# Patient Record
Sex: FEMALE | Race: BLACK OR AFRICAN AMERICAN | NOT HISPANIC OR LATINO | URBAN - METROPOLITAN AREA
[De-identification: names, ages, dates, MRNs, and addresses within clinical notes are randomized per-mention and may not be internally consistent; named-entity substitution may affect disease eponyms.]

---

## 2020-05-03 ENCOUNTER — INPATIENT (INPATIENT)
Facility: HOSPITAL | Age: 55
LOS: 4 days | Discharge: HOME | End: 2020-05-08
Attending: INTERNAL MEDICINE | Admitting: INTERNAL MEDICINE
Payer: COMMERCIAL

## 2020-05-03 VITALS
OXYGEN SATURATION: 98 % | SYSTOLIC BLOOD PRESSURE: 121 MMHG | HEART RATE: 110 BPM | RESPIRATION RATE: 18 BRPM | TEMPERATURE: 100 F | DIASTOLIC BLOOD PRESSURE: 71 MMHG

## 2020-05-03 DIAGNOSIS — K65.1 PERITONEAL ABSCESS: ICD-10-CM

## 2020-05-03 DIAGNOSIS — E11.8 TYPE 2 DIABETES MELLITUS WITH UNSPECIFIED COMPLICATIONS: ICD-10-CM

## 2020-05-03 LAB
ALBUMIN SERPL ELPH-MCNC: 3.1 G/DL — LOW (ref 3.3–5)
ALBUMIN SERPL ELPH-MCNC: 3.2 G/DL — LOW (ref 3.3–5)
ALP SERPL-CCNC: 105 U/L — SIGNIFICANT CHANGE UP (ref 40–120)
ALP SERPL-CCNC: 124 U/L — HIGH (ref 40–120)
ALT FLD-CCNC: 11 U/L — SIGNIFICANT CHANGE UP (ref 4–33)
ALT FLD-CCNC: 16 U/L — SIGNIFICANT CHANGE UP (ref 4–33)
ANION GAP SERPL CALC-SCNC: 15 MMO/L — HIGH (ref 7–14)
ANION GAP SERPL CALC-SCNC: 24 MMO/L — HIGH (ref 7–14)
ANISOCYTOSIS BLD QL: SLIGHT — SIGNIFICANT CHANGE UP
APPEARANCE UR: CLEAR — SIGNIFICANT CHANGE UP
APTT BLD: 33 SEC — SIGNIFICANT CHANGE UP (ref 27.5–36.3)
APTT BLD: 35.6 SEC — SIGNIFICANT CHANGE UP (ref 27.5–36.3)
AST SERPL-CCNC: 24 U/L — SIGNIFICANT CHANGE UP (ref 4–32)
AST SERPL-CCNC: 8 U/L — SIGNIFICANT CHANGE UP (ref 4–32)
B-OH-BUTYR SERPL-SCNC: 1.2 MMOL/L — HIGH (ref 0–0.4)
BACTERIA # UR AUTO: SIGNIFICANT CHANGE UP
BASE EXCESS BLDV CALC-SCNC: -4.1 MMOL/L — SIGNIFICANT CHANGE UP
BASOPHILS # BLD AUTO: 0.02 K/UL — SIGNIFICANT CHANGE UP (ref 0–0.2)
BASOPHILS # BLD AUTO: 0.03 K/UL — SIGNIFICANT CHANGE UP (ref 0–0.2)
BASOPHILS NFR BLD AUTO: 0.1 % — SIGNIFICANT CHANGE UP (ref 0–2)
BASOPHILS NFR BLD AUTO: 0.2 % — SIGNIFICANT CHANGE UP (ref 0–2)
BASOPHILS NFR SPEC: 0 % — SIGNIFICANT CHANGE UP (ref 0–2)
BASOPHILS NFR SPEC: 0 % — SIGNIFICANT CHANGE UP (ref 0–2)
BILIRUB SERPL-MCNC: 0.6 MG/DL — SIGNIFICANT CHANGE UP (ref 0.2–1.2)
BILIRUB SERPL-MCNC: 0.9 MG/DL — SIGNIFICANT CHANGE UP (ref 0.2–1.2)
BILIRUB UR-MCNC: NEGATIVE — SIGNIFICANT CHANGE UP
BLASTS # FLD: 0 % — SIGNIFICANT CHANGE UP (ref 0–0)
BLASTS # FLD: 0 % — SIGNIFICANT CHANGE UP (ref 0–0)
BLD GP AB SCN SERPL QL: NEGATIVE — SIGNIFICANT CHANGE UP
BLISTER CELLS BLD QL SMEAR: PRESENT — SIGNIFICANT CHANGE UP
BLOOD GAS VENOUS - CREATININE: 0.59 MG/DL — SIGNIFICANT CHANGE UP (ref 0.5–1.3)
BLOOD GAS VENOUS - FIO2: 21 — SIGNIFICANT CHANGE UP
BLOOD UR QL VISUAL: NEGATIVE — SIGNIFICANT CHANGE UP
BUN SERPL-MCNC: 11 MG/DL — SIGNIFICANT CHANGE UP (ref 7–23)
BUN SERPL-MCNC: 12 MG/DL — SIGNIFICANT CHANGE UP (ref 7–23)
CALCIUM SERPL-MCNC: 8.8 MG/DL — SIGNIFICANT CHANGE UP (ref 8.4–10.5)
CALCIUM SERPL-MCNC: 9.6 MG/DL — SIGNIFICANT CHANGE UP (ref 8.4–10.5)
CEA SERPL-MCNC: 1 NG/ML — SIGNIFICANT CHANGE UP (ref 1–3.8)
CHLORIDE BLDV-SCNC: 102 MMOL/L — SIGNIFICANT CHANGE UP (ref 96–108)
CHLORIDE SERPL-SCNC: 91 MMOL/L — LOW (ref 98–107)
CHLORIDE SERPL-SCNC: 97 MMOL/L — LOW (ref 98–107)
CO2 SERPL-SCNC: 15 MMOL/L — LOW (ref 22–31)
CO2 SERPL-SCNC: 23 MMOL/L — SIGNIFICANT CHANGE UP (ref 22–31)
COLOR SPEC: YELLOW — SIGNIFICANT CHANGE UP
CREAT SERPL-MCNC: 0.62 MG/DL — SIGNIFICANT CHANGE UP (ref 0.5–1.3)
CREAT SERPL-MCNC: 0.64 MG/DL — SIGNIFICANT CHANGE UP (ref 0.5–1.3)
DACRYOCYTES BLD QL SMEAR: SLIGHT — SIGNIFICANT CHANGE UP
ELLIPTOCYTES BLD QL SMEAR: SLIGHT — SIGNIFICANT CHANGE UP
ELLIPTOCYTES BLD QL SMEAR: SLIGHT — SIGNIFICANT CHANGE UP
EOSINOPHIL # BLD AUTO: 0 K/UL — SIGNIFICANT CHANGE UP (ref 0–0.5)
EOSINOPHIL # BLD AUTO: 0.31 K/UL — SIGNIFICANT CHANGE UP (ref 0–0.5)
EOSINOPHIL NFR BLD AUTO: 0 % — SIGNIFICANT CHANGE UP (ref 0–6)
EOSINOPHIL NFR BLD AUTO: 2 % — SIGNIFICANT CHANGE UP (ref 0–6)
EOSINOPHIL NFR FLD: 0 % — SIGNIFICANT CHANGE UP (ref 0–6)
EOSINOPHIL NFR FLD: 0 % — SIGNIFICANT CHANGE UP (ref 0–6)
GAS PNL BLDV: 130 MMOL/L — LOW (ref 136–146)
GIANT PLATELETS BLD QL SMEAR: PRESENT — SIGNIFICANT CHANGE UP
GIANT PLATELETS BLD QL SMEAR: PRESENT — SIGNIFICANT CHANGE UP
GLUCOSE BLDC GLUCOMTR-MCNC: 195 MG/DL — HIGH (ref 70–99)
GLUCOSE BLDC GLUCOMTR-MCNC: 317 MG/DL — HIGH (ref 70–99)
GLUCOSE BLDC GLUCOMTR-MCNC: 370 MG/DL — HIGH (ref 70–99)
GLUCOSE BLDC GLUCOMTR-MCNC: 413 MG/DL — HIGH (ref 70–99)
GLUCOSE BLDV-MCNC: 485 MG/DL — CRITICAL HIGH (ref 70–99)
GLUCOSE SERPL-MCNC: 393 MG/DL — HIGH (ref 70–99)
GLUCOSE SERPL-MCNC: 510 MG/DL — CRITICAL HIGH (ref 70–99)
GLUCOSE UR-MCNC: >1000 — HIGH
HBA1C BLD-MCNC: 12.9 % — HIGH (ref 4–5.6)
HCG SERPL-ACNC: < 5 MIU/ML — SIGNIFICANT CHANGE UP
HCO3 BLDV-SCNC: 21 MMOL/L — SIGNIFICANT CHANGE UP (ref 20–27)
HCT VFR BLD CALC: 36.1 % — SIGNIFICANT CHANGE UP (ref 34.5–45)
HCT VFR BLD CALC: 40.1 % — SIGNIFICANT CHANGE UP (ref 34.5–45)
HCT VFR BLDV CALC: 38.9 % — SIGNIFICANT CHANGE UP (ref 34.5–45)
HGB BLD-MCNC: 12.4 G/DL — SIGNIFICANT CHANGE UP (ref 11.5–15.5)
HGB BLD-MCNC: 12.6 G/DL — SIGNIFICANT CHANGE UP (ref 11.5–15.5)
HGB BLDV-MCNC: 12.6 G/DL — SIGNIFICANT CHANGE UP (ref 11.5–15.5)
HYALINE CASTS # UR AUTO: NEGATIVE — SIGNIFICANT CHANGE UP
IMM GRANULOCYTES NFR BLD AUTO: 0.2 % — SIGNIFICANT CHANGE UP (ref 0–1.5)
IMM GRANULOCYTES NFR BLD AUTO: 0.2 % — SIGNIFICANT CHANGE UP (ref 0–1.5)
INR BLD: 1.44 — HIGH (ref 0.88–1.17)
INR BLD: 1.48 — HIGH (ref 0.88–1.17)
KETONES UR-MCNC: HIGH
LACTATE BLDV-MCNC: 3.1 MMOL/L — HIGH (ref 0.5–2)
LACTATE SERPL-SCNC: 1.7 MMOL/L — SIGNIFICANT CHANGE UP (ref 0.5–2)
LEUKOCYTE ESTERASE UR-ACNC: NEGATIVE — SIGNIFICANT CHANGE UP
LIDOCAIN IGE QN: 13.3 U/L — SIGNIFICANT CHANGE UP (ref 7–60)
LYMPHOCYTES # BLD AUTO: 0.68 K/UL — LOW (ref 1–3.3)
LYMPHOCYTES # BLD AUTO: 1.07 K/UL — SIGNIFICANT CHANGE UP (ref 1–3.3)
LYMPHOCYTES # BLD AUTO: 4.2 % — LOW (ref 13–44)
LYMPHOCYTES # BLD AUTO: 7 % — LOW (ref 13–44)
LYMPHOCYTES NFR SPEC AUTO: 4.4 % — LOW (ref 13–44)
LYMPHOCYTES NFR SPEC AUTO: 7 % — LOW (ref 13–44)
MANUAL SMEAR VERIFICATION: SIGNIFICANT CHANGE UP
MCHC RBC-ENTMCNC: 25.3 PG — LOW (ref 27–34)
MCHC RBC-ENTMCNC: 27.1 PG — SIGNIFICANT CHANGE UP (ref 27–34)
MCHC RBC-ENTMCNC: 31.4 % — LOW (ref 32–36)
MCHC RBC-ENTMCNC: 34.3 % — SIGNIFICANT CHANGE UP (ref 32–36)
MCV RBC AUTO: 78.8 FL — LOW (ref 80–100)
MCV RBC AUTO: 80.4 FL — SIGNIFICANT CHANGE UP (ref 80–100)
METAMYELOCYTES # FLD: 3.5 % — HIGH (ref 0–1)
METAMYELOCYTES # FLD: 9.6 % — HIGH (ref 0–1)
MICROCYTES BLD QL: SLIGHT — SIGNIFICANT CHANGE UP
MONOCYTES # BLD AUTO: 0.37 K/UL — SIGNIFICANT CHANGE UP (ref 0–0.9)
MONOCYTES # BLD AUTO: 0.51 K/UL — SIGNIFICANT CHANGE UP (ref 0–0.9)
MONOCYTES NFR BLD AUTO: 2.3 % — SIGNIFICANT CHANGE UP (ref 2–14)
MONOCYTES NFR BLD AUTO: 3.3 % — SIGNIFICANT CHANGE UP (ref 2–14)
MONOCYTES NFR BLD: 0.9 % — LOW (ref 2–9)
MONOCYTES NFR BLD: 2.6 % — SIGNIFICANT CHANGE UP (ref 2–9)
MYELOCYTES NFR BLD: 0 % — SIGNIFICANT CHANGE UP (ref 0–0)
MYELOCYTES NFR BLD: 1.8 % — HIGH (ref 0–0)
NEUTROPHIL AB SER-ACNC: 61.7 % — SIGNIFICANT CHANGE UP (ref 43–77)
NEUTROPHIL AB SER-ACNC: 72.8 % — SIGNIFICANT CHANGE UP (ref 43–77)
NEUTROPHILS # BLD AUTO: 13.29 K/UL — HIGH (ref 1.8–7.4)
NEUTROPHILS # BLD AUTO: 15.08 K/UL — HIGH (ref 1.8–7.4)
NEUTROPHILS NFR BLD AUTO: 87.4 % — HIGH (ref 43–77)
NEUTROPHILS NFR BLD AUTO: 93.1 % — HIGH (ref 43–77)
NEUTS BAND # BLD: 10.5 % — HIGH (ref 0–6)
NEUTS BAND # BLD: 23.5 % — HIGH (ref 0–6)
NITRITE UR-MCNC: NEGATIVE — SIGNIFICANT CHANGE UP
NRBC # FLD: 0 K/UL — SIGNIFICANT CHANGE UP (ref 0–0)
NRBC # FLD: 0 K/UL — SIGNIFICANT CHANGE UP (ref 0–0)
OTHER - HEMATOLOGY %: 0 — SIGNIFICANT CHANGE UP
OTHER - HEMATOLOGY %: 0 — SIGNIFICANT CHANGE UP
PCO2 BLDV: 39 MMHG — LOW (ref 41–51)
PH BLDV: 7.35 PH — SIGNIFICANT CHANGE UP (ref 7.32–7.43)
PH UR: 6 — SIGNIFICANT CHANGE UP (ref 5–8)
PLATELET # BLD AUTO: 523 K/UL — HIGH (ref 150–400)
PLATELET # BLD AUTO: 545 K/UL — HIGH (ref 150–400)
PLATELET COUNT - ESTIMATE: SIGNIFICANT CHANGE UP
PLATELET COUNT - ESTIMATE: SIGNIFICANT CHANGE UP
PMV BLD: 10.2 FL — SIGNIFICANT CHANGE UP (ref 7–13)
PMV BLD: 9.6 FL — SIGNIFICANT CHANGE UP (ref 7–13)
PO2 BLDV: 69 MMHG — HIGH (ref 35–40)
POIKILOCYTOSIS BLD QL AUTO: SIGNIFICANT CHANGE UP
POIKILOCYTOSIS BLD QL AUTO: SLIGHT — SIGNIFICANT CHANGE UP
POLYCHROMASIA BLD QL SMEAR: SIGNIFICANT CHANGE UP
POLYCHROMASIA BLD QL SMEAR: SLIGHT — SIGNIFICANT CHANGE UP
POTASSIUM BLDV-SCNC: 4.1 MMOL/L — SIGNIFICANT CHANGE UP (ref 3.4–4.5)
POTASSIUM SERPL-MCNC: 4.3 MMOL/L — SIGNIFICANT CHANGE UP (ref 3.5–5.3)
POTASSIUM SERPL-MCNC: 5 MMOL/L — SIGNIFICANT CHANGE UP (ref 3.5–5.3)
POTASSIUM SERPL-SCNC: 4.3 MMOL/L — SIGNIFICANT CHANGE UP (ref 3.5–5.3)
POTASSIUM SERPL-SCNC: 5 MMOL/L — SIGNIFICANT CHANGE UP (ref 3.5–5.3)
PROMYELOCYTES # FLD: 0 % — SIGNIFICANT CHANGE UP (ref 0–0)
PROMYELOCYTES # FLD: 0 % — SIGNIFICANT CHANGE UP (ref 0–0)
PROT SERPL-MCNC: 7.3 G/DL — SIGNIFICANT CHANGE UP (ref 6–8.3)
PROT SERPL-MCNC: 8 G/DL — SIGNIFICANT CHANGE UP (ref 6–8.3)
PROT UR-MCNC: 20 — SIGNIFICANT CHANGE UP
PROTHROM AB SERPL-ACNC: 16.8 SEC — HIGH (ref 9.8–13.1)
PROTHROM AB SERPL-ACNC: 17.1 SEC — HIGH (ref 9.8–13.1)
RBC # BLD: 4.58 M/UL — SIGNIFICANT CHANGE UP (ref 3.8–5.2)
RBC # BLD: 4.99 M/UL — SIGNIFICANT CHANGE UP (ref 3.8–5.2)
RBC # FLD: 14.6 % — HIGH (ref 10.3–14.5)
RBC # FLD: 14.7 % — HIGH (ref 10.3–14.5)
RBC CASTS # UR COMP ASSIST: SIGNIFICANT CHANGE UP (ref 0–?)
RH IG SCN BLD-IMP: POSITIVE — SIGNIFICANT CHANGE UP
SAO2 % BLDV: 91.1 % — HIGH (ref 60–85)
SODIUM SERPL-SCNC: 130 MMOL/L — LOW (ref 135–145)
SODIUM SERPL-SCNC: 135 MMOL/L — SIGNIFICANT CHANGE UP (ref 135–145)
SP GR SPEC: > 1.04 — HIGH (ref 1–1.04)
SPHEROCYTES BLD QL SMEAR: SLIGHT — SIGNIFICANT CHANGE UP
SQUAMOUS # UR AUTO: SIGNIFICANT CHANGE UP
UROBILINOGEN FLD QL: NORMAL — SIGNIFICANT CHANGE UP
VARIANT LYMPHS # BLD: 0 % — SIGNIFICANT CHANGE UP
VARIANT LYMPHS # BLD: 1.7 % — SIGNIFICANT CHANGE UP
WBC # BLD: 15.23 K/UL — HIGH (ref 3.8–10.5)
WBC # BLD: 16.2 K/UL — HIGH (ref 3.8–10.5)
WBC # FLD AUTO: 15.23 K/UL — HIGH (ref 3.8–10.5)
WBC # FLD AUTO: 16.2 K/UL — HIGH (ref 3.8–10.5)
WBC UR QL: SIGNIFICANT CHANGE UP (ref 0–?)

## 2020-05-03 PROCEDURE — 71045 X-RAY EXAM CHEST 1 VIEW: CPT | Mod: 26

## 2020-05-03 PROCEDURE — 99222 1ST HOSP IP/OBS MODERATE 55: CPT | Mod: GC

## 2020-05-03 PROCEDURE — 74177 CT ABD & PELVIS W/CONTRAST: CPT | Mod: 26

## 2020-05-03 RX ORDER — INSULIN LISPRO 100/ML
4 VIAL (ML) SUBCUTANEOUS ONCE
Refills: 0 | Status: DISCONTINUED | OUTPATIENT
Start: 2020-05-03 | End: 2020-05-03

## 2020-05-03 RX ORDER — INSULIN LISPRO 100/ML
VIAL (ML) SUBCUTANEOUS
Refills: 0 | Status: DISCONTINUED | OUTPATIENT
Start: 2020-05-03 | End: 2020-05-03

## 2020-05-03 RX ORDER — DEXTROSE 50 % IN WATER 50 %
12.5 SYRINGE (ML) INTRAVENOUS ONCE
Refills: 0 | Status: DISCONTINUED | OUTPATIENT
Start: 2020-05-03 | End: 2020-05-08

## 2020-05-03 RX ORDER — ACETAMINOPHEN 500 MG
650 TABLET ORAL ONCE
Refills: 0 | Status: COMPLETED | OUTPATIENT
Start: 2020-05-03 | End: 2020-05-03

## 2020-05-03 RX ORDER — GLUCAGON INJECTION, SOLUTION 0.5 MG/.1ML
1 INJECTION, SOLUTION SUBCUTANEOUS ONCE
Refills: 0 | Status: DISCONTINUED | OUTPATIENT
Start: 2020-05-03 | End: 2020-05-08

## 2020-05-03 RX ORDER — PIPERACILLIN AND TAZOBACTAM 4; .5 G/20ML; G/20ML
3.38 INJECTION, POWDER, LYOPHILIZED, FOR SOLUTION INTRAVENOUS ONCE
Refills: 0 | Status: COMPLETED | OUTPATIENT
Start: 2020-05-03 | End: 2020-05-03

## 2020-05-03 RX ORDER — MORPHINE SULFATE 50 MG/1
4 CAPSULE, EXTENDED RELEASE ORAL ONCE
Refills: 0 | Status: DISCONTINUED | OUTPATIENT
Start: 2020-05-03 | End: 2020-05-03

## 2020-05-03 RX ORDER — DEXTROSE 50 % IN WATER 50 %
15 SYRINGE (ML) INTRAVENOUS ONCE
Refills: 0 | Status: DISCONTINUED | OUTPATIENT
Start: 2020-05-03 | End: 2020-05-08

## 2020-05-03 RX ORDER — INSULIN GLARGINE 100 [IU]/ML
18 INJECTION, SOLUTION SUBCUTANEOUS ONCE
Refills: 0 | Status: COMPLETED | OUTPATIENT
Start: 2020-05-03 | End: 2020-05-03

## 2020-05-03 RX ORDER — FENTANYL CITRATE 50 UG/ML
50 INJECTION INTRAVENOUS ONCE
Refills: 0 | Status: DISCONTINUED | OUTPATIENT
Start: 2020-05-03 | End: 2020-05-03

## 2020-05-03 RX ORDER — INSULIN GLARGINE 100 [IU]/ML
18 INJECTION, SOLUTION SUBCUTANEOUS AT BEDTIME
Refills: 0 | Status: DISCONTINUED | OUTPATIENT
Start: 2020-05-04 | End: 2020-05-04

## 2020-05-03 RX ORDER — HYDROMORPHONE HYDROCHLORIDE 2 MG/ML
0.5 INJECTION INTRAMUSCULAR; INTRAVENOUS; SUBCUTANEOUS ONCE
Refills: 0 | Status: DISCONTINUED | OUTPATIENT
Start: 2020-05-03 | End: 2020-05-04

## 2020-05-03 RX ORDER — INSULIN HUMAN 100 [IU]/ML
6 INJECTION, SOLUTION SUBCUTANEOUS ONCE
Refills: 0 | Status: COMPLETED | OUTPATIENT
Start: 2020-05-03 | End: 2020-05-03

## 2020-05-03 RX ORDER — INSULIN LISPRO 100/ML
VIAL (ML) SUBCUTANEOUS EVERY 4 HOURS
Refills: 0 | Status: DISCONTINUED | OUTPATIENT
Start: 2020-05-03 | End: 2020-05-04

## 2020-05-03 RX ORDER — SODIUM CHLORIDE 9 MG/ML
1000 INJECTION, SOLUTION INTRAVENOUS
Refills: 0 | Status: DISCONTINUED | OUTPATIENT
Start: 2020-05-03 | End: 2020-05-08

## 2020-05-03 RX ORDER — INSULIN LISPRO 100/ML
VIAL (ML) SUBCUTANEOUS EVERY 6 HOURS
Refills: 0 | Status: DISCONTINUED | OUTPATIENT
Start: 2020-05-03 | End: 2020-05-03

## 2020-05-03 RX ORDER — SODIUM CHLORIDE 9 MG/ML
1000 INJECTION INTRAMUSCULAR; INTRAVENOUS; SUBCUTANEOUS ONCE
Refills: 0 | Status: COMPLETED | OUTPATIENT
Start: 2020-05-03 | End: 2020-05-03

## 2020-05-03 RX ORDER — SODIUM CHLORIDE 9 MG/ML
1000 INJECTION, SOLUTION INTRAVENOUS
Refills: 0 | Status: DISCONTINUED | OUTPATIENT
Start: 2020-05-03 | End: 2020-05-03

## 2020-05-03 RX ORDER — KETOROLAC TROMETHAMINE 30 MG/ML
15 SYRINGE (ML) INJECTION ONCE
Refills: 0 | Status: DISCONTINUED | OUTPATIENT
Start: 2020-05-03 | End: 2020-05-03

## 2020-05-03 RX ORDER — HYDROMORPHONE HYDROCHLORIDE 2 MG/ML
0.5 INJECTION INTRAMUSCULAR; INTRAVENOUS; SUBCUTANEOUS EVERY 4 HOURS
Refills: 0 | Status: DISCONTINUED | OUTPATIENT
Start: 2020-05-03 | End: 2020-05-05

## 2020-05-03 RX ORDER — DEXTROSE 50 % IN WATER 50 %
25 SYRINGE (ML) INTRAVENOUS ONCE
Refills: 0 | Status: DISCONTINUED | OUTPATIENT
Start: 2020-05-03 | End: 2020-05-08

## 2020-05-03 RX ORDER — ACETAMINOPHEN 500 MG
1000 TABLET ORAL ONCE
Refills: 0 | Status: COMPLETED | OUTPATIENT
Start: 2020-05-03 | End: 2020-05-03

## 2020-05-03 RX ORDER — ACETAMINOPHEN 500 MG
1000 TABLET ORAL EVERY 6 HOURS
Refills: 0 | Status: COMPLETED | OUTPATIENT
Start: 2020-05-03 | End: 2020-05-04

## 2020-05-03 RX ORDER — SODIUM CHLORIDE 9 MG/ML
1000 INJECTION INTRAMUSCULAR; INTRAVENOUS; SUBCUTANEOUS
Refills: 0 | Status: DISCONTINUED | OUTPATIENT
Start: 2020-05-03 | End: 2020-05-05

## 2020-05-03 RX ORDER — PIPERACILLIN AND TAZOBACTAM 4; .5 G/20ML; G/20ML
3.38 INJECTION, POWDER, LYOPHILIZED, FOR SOLUTION INTRAVENOUS EVERY 8 HOURS
Refills: 0 | Status: DISCONTINUED | OUTPATIENT
Start: 2020-05-03 | End: 2020-05-08

## 2020-05-03 RX ORDER — INSULIN LISPRO 100/ML
VIAL (ML) SUBCUTANEOUS AT BEDTIME
Refills: 0 | Status: DISCONTINUED | OUTPATIENT
Start: 2020-05-03 | End: 2020-05-03

## 2020-05-03 RX ADMIN — Medication 8: at 19:26

## 2020-05-03 RX ADMIN — FENTANYL CITRATE 50 MICROGRAM(S): 50 INJECTION INTRAVENOUS at 16:04

## 2020-05-03 RX ADMIN — SODIUM CHLORIDE 1000 MILLILITER(S): 9 INJECTION INTRAMUSCULAR; INTRAVENOUS; SUBCUTANEOUS at 16:30

## 2020-05-03 RX ADMIN — SODIUM CHLORIDE 100 MILLILITER(S): 9 INJECTION INTRAMUSCULAR; INTRAVENOUS; SUBCUTANEOUS at 22:39

## 2020-05-03 RX ADMIN — MORPHINE SULFATE 4 MILLIGRAM(S): 50 CAPSULE, EXTENDED RELEASE ORAL at 13:10

## 2020-05-03 RX ADMIN — PIPERACILLIN AND TAZOBACTAM 200 GRAM(S): 4; .5 INJECTION, POWDER, LYOPHILIZED, FOR SOLUTION INTRAVENOUS at 13:10

## 2020-05-03 RX ADMIN — INSULIN HUMAN 6 UNIT(S): 100 INJECTION, SOLUTION SUBCUTANEOUS at 16:16

## 2020-05-03 RX ADMIN — Medication 400 MILLIGRAM(S): at 21:17

## 2020-05-03 RX ADMIN — Medication 650 MILLIGRAM(S): at 15:11

## 2020-05-03 RX ADMIN — SODIUM CHLORIDE 100 MILLILITER(S): 9 INJECTION, SOLUTION INTRAVENOUS at 19:28

## 2020-05-03 RX ADMIN — PIPERACILLIN AND TAZOBACTAM 25 GRAM(S): 4; .5 INJECTION, POWDER, LYOPHILIZED, FOR SOLUTION INTRAVENOUS at 22:39

## 2020-05-03 RX ADMIN — SODIUM CHLORIDE 1000 MILLILITER(S): 9 INJECTION INTRAMUSCULAR; INTRAVENOUS; SUBCUTANEOUS at 13:11

## 2020-05-03 RX ADMIN — SODIUM CHLORIDE 100 MILLILITER(S): 9 INJECTION, SOLUTION INTRAVENOUS at 17:04

## 2020-05-03 RX ADMIN — INSULIN GLARGINE 18 UNIT(S): 100 INJECTION, SOLUTION SUBCUTANEOUS at 19:28

## 2020-05-03 NOTE — ED PROVIDER NOTE - CLINICAL SUMMARY MEDICAL DECISION MAKING FREE TEXT BOX
54 Y F with 3 days increasing ABD pain, diffuse TTP on exam with exception of RUQ, no rebound however has some voluntary guarding. Concern for complicated diverticulitis, pancreatitis or appendicitis. Feel that GB unlikely given no TTP in RUQ, fever here will give zosyn to cover for intra-abdominal cause. Will get CT AP as well as cxr and urine screening for other sources of infection. Pt will likely need admission.

## 2020-05-03 NOTE — ED ADULT NURSE REASSESSMENT NOTE - GENERAL PATIENT STATE
cooperative/smiling/interactive/comfortable appearance/resting/sleeping
cooperative/improvement verbalized/comfortable appearance/resting/sleeping

## 2020-05-03 NOTE — H&P ADULT - ASSESSMENT
54F with perforated descending colon, possible malignant in origin vs diverticulitis although no evidence of diverticular disease. Currently hemodynamically stable with hyperglycemia (borderlines DKA).

## 2020-05-03 NOTE — ED ADULT NURSE NOTE - NSIMPLEMENTINTERV_GEN_ALL_ED
Implemented All Universal Safety Interventions:  [place] to call system. Call bell, personal items and telephone within reach. Instruct patient to call for assistance. Room bathroom lighting operational. Non-slip footwear when patient is off stretcher. Physically safe environment: no spills, clutter or unnecessary equipment. Stretcher in lowest position, wheels locked, appropriate side rails in place.

## 2020-05-03 NOTE — H&P ADULT - NSHPLABSRESULTS_GEN_ALL_CORE
WBC 16  K 5 moderately hemolyzed  Glucose 510  Lactate 3.1    EXAM:  CT ABDOMEN AND PELVIS IC        PROCEDURE DATE:  May  3 2020         INTERPRETATION:  CLINICAL INFORMATION: Diffuse abdominal pain.    COMPARISON: None.    PROCEDURE:   CT of the Abdomen and Pelvis was performed with intravenous contrast.   Intravenous contrast: 90 ml Omnipaque 350. 10 ml discarded.  Oral contrast: None.  Sagittal and coronal reformats were performed.    FINDINGS:    LOWER CHEST: Within normal limits.    LIVER: Within normal limits.  BILE DUCTS: Normal caliber.  GALLBLADDER: Within normal limits.  SPLEEN: Within normal limits.  PANCREAS: Within normal limits.  ADRENALS: Within normal limits.  KIDNEYS/URETERS: Within normal limits.    BLADDER: Within normal limits.  REPRODUCTIVE ORGANS: Fibroid uterus.    BOWEL:     4.5 x 4.3 x 2.5 cm fluid and gas containing collection with rim abutting the anterior aspect of the descending colon and the left upper quadrant (2:49). There is surrounding inflammatory change. Findings are suggestive of complicated diverticulitis with abscess formation. However no discrete colonic diverticula are present. Malignancy is not excluded from the differential.    There is a 2.2 cm well-circumscribed ovoid fluid attenuation structure directly abutting the proximal jejunum (602:51). This may represent tracking fluid or an enteric duplication cyst.     No bowel obstruction. Appendix is normal.  PERITONEUM: Trace ascites. No free intraperitoneal air.  VESSELS: Within normal limits.  RETROPERITONEUM/LYMPH NODES: No lymphadenopathy.    ABDOMINAL WALL: Within normal limits.  BONES: Within normal limits.    IMPRESSION:     4.5 x 4.3 x 2.5 cm fluid and gas containing collection with rim abutting the anterior aspect of the descending colon and the left upper quadrant (2:49). There is surrounding inflammatory change. Findings are suggestive of complicated diverticulitis with abscess formation. However no discrete colonic diverticula are present. Malignancy is not excluded from the differential.    No free intraperitoneal air.

## 2020-05-03 NOTE — ED PROVIDER NOTE - ATTENDING CONTRIBUTION TO CARE
DR. KURTZ, ATTENDING MD-  I performed a face to face bedside interview with the patient regarding history of present illness, review of symptoms and past medical history. I completed an independent physical exam.  I have discussed the patient's plan of care with the resident.   Documentation as above in the note.    53 y/o female with abd pain, n/v x3 days.  Febrile, tachy.  TTP left side of abd.  No r/g.  Evaluate for diverticulitis, colitis, appendicitis, uti.  Obtain cbc, cmp, ct a/p, ua, ucx, give ivf, abx, reassess.

## 2020-05-03 NOTE — H&P ADULT - ATTENDING COMMENTS
I saw and examined the patient. I was physically present for the key portions of the evaluation and management (E/M) service provided.  I agree with the above history, physical, and plan which I have reviewed and edited where appropriate.    K57.20 Diverticulitis of large intestine with abscess without bleeding   -perforated segment of descending colon with colonic thickening. No prominent lymph nodes on my inspection. Worrisome for potential malignancy. CEA ordered  -will need colonoscopy in the near future once inflammatory process subsides  -No immediate surgical intervention needed at this time but will continue serial exams in case of worsening infection  E11.9 Type 2 diabetes mellitus without complication, without long-term current use of insulin   R73.9 Hyperglycemia   -basal/bolus regimen, trend BMP/fingersticks  -A1C ordered     I spent 35min reviewing history, data, images and in discussion/coordination of care. Greater than 50% of my time was spent in face-to-face discussion with the patient regarding possible intervention and need for admission as well as possibility of malignancy.     Michael Preston MD  Acute and Critical Care Surgery    The Acute Care Surgery (B Team) Attending Group Practice:  Dr. Archana Howard, Dr. Tariq Romero, Dr. Michael Preston, and Dr. Tom Edouard    Urgent issues - spectra [phone] or [phone]  Nonurgent issues - [phone]  Patient appointments or after hours - [phone]

## 2020-05-03 NOTE — ED ADULT NURSE REASSESSMENT NOTE - NS ED NURSE REASSESS COMMENT FT1
pt states her pain has partially improved, still complaining of abdominal pain in the RLQ and LLQ. PT states "its gotten better and I want to wait and see before I get anymore pain medication." MD Bee notified. Labs and urine specimen sent. Pt resting comfortably, will continue to monitor.

## 2020-05-03 NOTE — H&P ADULT - PROBLEM SELECTOR PLAN 1
Admit to surgery, Dr. Mohan GONZALEZ  IVF  Will consult IR for percutaneous drainage  Will also need GI consultation for colonoscopy  NPO for now  DVT ppx Admit to surgery, Dr. Mohan GONZALEZ  IVF  Will consult IR for percutaneous drainage  Will also need GI consultation for colonoscopy  NPO for now  DVT ppx on hold for now for possible IR procedure. Will nee to restarted once IR recommendations obtained.

## 2020-05-03 NOTE — H&P ADULT - HISTORY OF PRESENT ILLNESS
54F presents with 3 days of acute abdominal pain located in LUQ. 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 This morning awoke with intense abdominal pain, N/V x 5 hours, came to [facility] ED. No HA, CP, SOB. +N/V, No D/C. No fevers at home (states sister measured temperature this morning). Has never had this pain before. Has never had a colonoscopy. Recently diagnosed with DM, does not like to stick herself with needles so does not check blood glucose at home. Takes a "small blue pill" for DM.

## 2020-05-03 NOTE — ED ADULT NURSE NOTE - NSFALLRSKPASTHIST_ED_ALL_ED
[FreeTextEntry1] : Patient is a 56-year-old gentleman who is referred for screening colonoscopy. He had his last colonoscopy over 10 years ago. He did have colon polyps. His bowel movements are regular. He does not see any blood in the stool. He has no abdominal pain.\par He has no upper gastrointestinal symptoms. no

## 2020-05-03 NOTE — ED ADULT NURSE REASSESSMENT NOTE - NS ED NURSE REASSESS COMMENT FT1
pt states she is feeling much better and that the pain in her abdomen has decreased. Abdomen is  to touch in the RLQ and LLQ. vitals are normal as noted, labs drawn and sent. pt resting comfortably, report given to RN and awaiting transport.

## 2020-05-03 NOTE — H&P ADULT - NSHPPHYSICALEXAM_GEN_ALL_CORE
AAO, anxious, lying in bed in mild distress 2/2 pain  No respiratory distress, speaking in complete sentences. No audible wheezing  Abdomen obese, soft, diffuse abdominal tenderness worse in LUQ with focal peritonitis.  Calves soft, non-tender,

## 2020-05-03 NOTE — ED PROVIDER NOTE - OBJECTIVE STATEMENT
54 Y F with hx of DM here with abd pain x 3 days, says that pain is getting worse. She described the pain as her whole abdomen, denies prior surgeries. She denies fever however arrives here febrile. She says that she has had decreased PO over the past few days. Denies nausea vomiting or diarrhea. She says that she is having normal bowel movements. Denies SOB, cough, chest pain or LOC.

## 2020-05-03 NOTE — ED ADULT NURSE NOTE - OBJECTIVE STATEMENT
received pt to room 3 A&Ox3, ambulatory, calm and cooperative. pt is complaining of abdominal pain x3days. Hx of HTN, DM. abdomen is tender in the RLQ and LLQ, distended and bowel sounds positive in al four quadrants. Denies SOB, fever/chills, received pt to room 3 A&Ox3, ambulatory, calm and cooperative. pt is complaining of abdominal pain x3days. Hx of HTN, DM. abdomen is tender in the RLQ and LLQ, distended and bowel sounds positive in al four quadrants. Denies SOB, fever/chills, chest pain, weakness/palpitations. 20G placed to L AC, labs sent, meds given as ordered. pt resting comfortably. Will continue to monitor. Received pt to room 3 A&Ox3, ambulatory without assistance, calm and cooperative. pt is complaining of abdominal pain x3days. Hx of HTN, DM. abdomen is tender in the RLQ and LLQ, distended and bowel sounds positive in all four quadrants. Denies SOB, fever/chills, chest pain, weakness/palpitations, hematuria, constipation, pain/burning during urination. 20G placed to L AC, labs sent, meds given as ordered. pt resting comfortably. Will continue to monitor.

## 2020-05-04 DIAGNOSIS — E78.5 HYPERLIPIDEMIA, UNSPECIFIED: ICD-10-CM

## 2020-05-04 DIAGNOSIS — E11.65 TYPE 2 DIABETES MELLITUS WITH HYPERGLYCEMIA: ICD-10-CM

## 2020-05-04 DIAGNOSIS — I10 ESSENTIAL (PRIMARY) HYPERTENSION: ICD-10-CM

## 2020-05-04 LAB
ALBUMIN SERPL ELPH-MCNC: 3.2 G/DL — LOW (ref 3.3–5)
ALP SERPL-CCNC: 119 U/L — SIGNIFICANT CHANGE UP (ref 40–120)
ALT FLD-CCNC: 16 U/L — SIGNIFICANT CHANGE UP (ref 4–33)
ANION GAP SERPL CALC-SCNC: 15 MMO/L — HIGH (ref 7–14)
AST SERPL-CCNC: 11 U/L — SIGNIFICANT CHANGE UP (ref 4–32)
BASE EXCESS BLDV CALC-SCNC: -0.9 MMOL/L — SIGNIFICANT CHANGE UP
BASOPHILS # BLD AUTO: 0.05 K/UL — SIGNIFICANT CHANGE UP (ref 0–0.2)
BASOPHILS NFR BLD AUTO: 0.3 % — SIGNIFICANT CHANGE UP (ref 0–2)
BILIRUB SERPL-MCNC: 0.6 MG/DL — SIGNIFICANT CHANGE UP (ref 0.2–1.2)
BUN SERPL-MCNC: 14 MG/DL — SIGNIFICANT CHANGE UP (ref 7–23)
CALCIUM SERPL-MCNC: 9.3 MG/DL — SIGNIFICANT CHANGE UP (ref 8.4–10.5)
CHLORIDE SERPL-SCNC: 98 MMOL/L — SIGNIFICANT CHANGE UP (ref 98–107)
CO2 SERPL-SCNC: 21 MMOL/L — LOW (ref 22–31)
CREAT SERPL-MCNC: 0.49 MG/DL — LOW (ref 0.5–1.3)
CULTURE RESULTS: SIGNIFICANT CHANGE UP
EOSINOPHIL # BLD AUTO: 0 K/UL — SIGNIFICANT CHANGE UP (ref 0–0.5)
EOSINOPHIL NFR BLD AUTO: 0 % — SIGNIFICANT CHANGE UP (ref 0–6)
GAS PNL BLDV: 136 MMOL/L — SIGNIFICANT CHANGE UP (ref 136–146)
GLUCOSE BLDC GLUCOMTR-MCNC: 209 MG/DL — HIGH (ref 70–99)
GLUCOSE BLDC GLUCOMTR-MCNC: 211 MG/DL — HIGH (ref 70–99)
GLUCOSE BLDC GLUCOMTR-MCNC: 227 MG/DL — HIGH (ref 70–99)
GLUCOSE BLDC GLUCOMTR-MCNC: 230 MG/DL — HIGH (ref 70–99)
GLUCOSE BLDC GLUCOMTR-MCNC: 234 MG/DL — HIGH (ref 70–99)
GLUCOSE BLDC GLUCOMTR-MCNC: 248 MG/DL — HIGH (ref 70–99)
GLUCOSE BLDC GLUCOMTR-MCNC: 259 MG/DL — HIGH (ref 70–99)
GLUCOSE BLDC GLUCOMTR-MCNC: 270 MG/DL — HIGH (ref 70–99)
GLUCOSE BLDV-MCNC: 230 MG/DL — HIGH (ref 70–99)
GLUCOSE SERPL-MCNC: 265 MG/DL — HIGH (ref 70–99)
HCO3 BLDV-SCNC: 24 MMOL/L — SIGNIFICANT CHANGE UP (ref 20–27)
HCT VFR BLD CALC: 39.1 % — SIGNIFICANT CHANGE UP (ref 34.5–45)
HCT VFR BLDV CALC: 37.4 % — SIGNIFICANT CHANGE UP (ref 34.5–45)
HGB BLD-MCNC: 12.3 G/DL — SIGNIFICANT CHANGE UP (ref 11.5–15.5)
HGB BLDV-MCNC: 12.1 G/DL — SIGNIFICANT CHANGE UP (ref 11.5–15.5)
IMM GRANULOCYTES NFR BLD AUTO: 0.6 % — SIGNIFICANT CHANGE UP (ref 0–1.5)
LACTATE SERPL-SCNC: 2.3 MMOL/L — HIGH (ref 0.5–2)
LYMPHOCYTES # BLD AUTO: 1.16 K/UL — SIGNIFICANT CHANGE UP (ref 1–3.3)
LYMPHOCYTES # BLD AUTO: 6.1 % — LOW (ref 13–44)
MAGNESIUM SERPL-MCNC: 2.1 MG/DL — SIGNIFICANT CHANGE UP (ref 1.6–2.6)
MCHC RBC-ENTMCNC: 25.3 PG — LOW (ref 27–34)
MCHC RBC-ENTMCNC: 31.5 % — LOW (ref 32–36)
MCV RBC AUTO: 80.5 FL — SIGNIFICANT CHANGE UP (ref 80–100)
MONOCYTES # BLD AUTO: 0.42 K/UL — SIGNIFICANT CHANGE UP (ref 0–0.9)
MONOCYTES NFR BLD AUTO: 2.2 % — SIGNIFICANT CHANGE UP (ref 2–14)
NEUTROPHILS # BLD AUTO: 17.2 K/UL — HIGH (ref 1.8–7.4)
NEUTROPHILS NFR BLD AUTO: 90.8 % — HIGH (ref 43–77)
NRBC # FLD: 0 K/UL — SIGNIFICANT CHANGE UP (ref 0–0)
PCO2 BLDV: 40 MMHG — LOW (ref 41–51)
PH BLDV: 7.39 PH — SIGNIFICANT CHANGE UP (ref 7.32–7.43)
PHOSPHATE SERPL-MCNC: 2.9 MG/DL — SIGNIFICANT CHANGE UP (ref 2.5–4.5)
PLATELET # BLD AUTO: 605 K/UL — HIGH (ref 150–400)
PMV BLD: 9.9 FL — SIGNIFICANT CHANGE UP (ref 7–13)
PO2 BLDV: 114 MMHG — HIGH (ref 35–40)
POTASSIUM BLDV-SCNC: 3.9 MMOL/L — SIGNIFICANT CHANGE UP (ref 3.4–4.5)
POTASSIUM SERPL-MCNC: 4 MMOL/L — SIGNIFICANT CHANGE UP (ref 3.5–5.3)
POTASSIUM SERPL-SCNC: 4 MMOL/L — SIGNIFICANT CHANGE UP (ref 3.5–5.3)
PROT SERPL-MCNC: 8.1 G/DL — SIGNIFICANT CHANGE UP (ref 6–8.3)
RBC # BLD: 4.86 M/UL — SIGNIFICANT CHANGE UP (ref 3.8–5.2)
RBC # FLD: 14.8 % — HIGH (ref 10.3–14.5)
SAO2 % BLDV: 98.1 % — HIGH (ref 60–85)
SARS-COV-2 RNA SPEC QL NAA+PROBE: SIGNIFICANT CHANGE UP
SODIUM SERPL-SCNC: 134 MMOL/L — LOW (ref 135–145)
SPECIMEN SOURCE: SIGNIFICANT CHANGE UP
WBC # BLD: 18.94 K/UL — HIGH (ref 3.8–10.5)
WBC # FLD AUTO: 18.94 K/UL — HIGH (ref 3.8–10.5)

## 2020-05-04 PROCEDURE — 49406 IMAGE CATH FLUID PERI/RETRO: CPT

## 2020-05-04 PROCEDURE — 99233 SBSQ HOSP IP/OBS HIGH 50: CPT

## 2020-05-04 PROCEDURE — 99231 SBSQ HOSP IP/OBS SF/LOW 25: CPT

## 2020-05-04 PROCEDURE — 99222 1ST HOSP IP/OBS MODERATE 55: CPT | Mod: GC

## 2020-05-04 RX ORDER — INSULIN LISPRO 100/ML
VIAL (ML) SUBCUTANEOUS
Refills: 0 | Status: DISCONTINUED | OUTPATIENT
Start: 2020-05-04 | End: 2020-05-05

## 2020-05-04 RX ORDER — ENOXAPARIN SODIUM 100 MG/ML
40 INJECTION SUBCUTANEOUS DAILY
Refills: 0 | Status: DISCONTINUED | OUTPATIENT
Start: 2020-05-04 | End: 2020-05-08

## 2020-05-04 RX ORDER — INSULIN GLARGINE 100 [IU]/ML
22 INJECTION, SOLUTION SUBCUTANEOUS AT BEDTIME
Refills: 0 | Status: DISCONTINUED | OUTPATIENT
Start: 2020-05-04 | End: 2020-05-05

## 2020-05-04 RX ORDER — INSULIN LISPRO 100/ML
VIAL (ML) SUBCUTANEOUS EVERY 6 HOURS
Refills: 0 | Status: DISCONTINUED | OUTPATIENT
Start: 2020-05-04 | End: 2020-05-04

## 2020-05-04 RX ORDER — SODIUM CHLORIDE 9 MG/ML
1000 INJECTION, SOLUTION INTRAVENOUS ONCE
Refills: 0 | Status: COMPLETED | OUTPATIENT
Start: 2020-05-04 | End: 2020-05-04

## 2020-05-04 RX ADMIN — INSULIN GLARGINE 22 UNIT(S): 100 INJECTION, SOLUTION SUBCUTANEOUS at 22:37

## 2020-05-04 RX ADMIN — PIPERACILLIN AND TAZOBACTAM 25 GRAM(S): 4; .5 INJECTION, POWDER, LYOPHILIZED, FOR SOLUTION INTRAVENOUS at 22:38

## 2020-05-04 RX ADMIN — PIPERACILLIN AND TAZOBACTAM 25 GRAM(S): 4; .5 INJECTION, POWDER, LYOPHILIZED, FOR SOLUTION INTRAVENOUS at 13:31

## 2020-05-04 RX ADMIN — SODIUM CHLORIDE 100 MILLILITER(S): 9 INJECTION INTRAMUSCULAR; INTRAVENOUS; SUBCUTANEOUS at 09:16

## 2020-05-04 RX ADMIN — Medication 400 MILLIGRAM(S): at 09:16

## 2020-05-04 RX ADMIN — Medication 400 MILLIGRAM(S): at 14:35

## 2020-05-04 RX ADMIN — Medication 2: at 03:39

## 2020-05-04 RX ADMIN — HYDROMORPHONE HYDROCHLORIDE 0.5 MILLIGRAM(S): 2 INJECTION INTRAMUSCULAR; INTRAVENOUS; SUBCUTANEOUS at 00:19

## 2020-05-04 RX ADMIN — Medication 2: at 18:17

## 2020-05-04 RX ADMIN — PIPERACILLIN AND TAZOBACTAM 25 GRAM(S): 4; .5 INJECTION, POWDER, LYOPHILIZED, FOR SOLUTION INTRAVENOUS at 06:23

## 2020-05-04 RX ADMIN — SODIUM CHLORIDE 1000 MILLILITER(S): 9 INJECTION, SOLUTION INTRAVENOUS at 11:42

## 2020-05-04 RX ADMIN — Medication 400 MILLIGRAM(S): at 21:56

## 2020-05-04 NOTE — CONSULT NOTE ADULT - ASSESSMENT
54 year old woman without PMH who presented with 3 days of acute abdominal pain located in LUQ with associated nausea and vomiting, found to have large pericolonic abscess, with concern for underlying bowel perforation secondary to complicated diverticulitis vs. colon mass.     Impression:  #Pericolonic abscess    #Type 2 diabetes    Recommendations: 54 year old woman with newly diagnosed type 2 diabetes who presented with 3 days of acute abdominal pain located in LUQ with associated nausea and vomiting, found to have large pericolonic abscess, with concern for underlying bowel perforation secondary to complicated diverticulitis vs. underlying colon mass.     Impression:  #Pericolonic fluid collection: Concern for abscess or contained colonic perforation given leukocytosis. Etiology unclear but differential includes complicated diverticulitis vs. underlying colon mass. Age >50 years only risk factor for colon malignancy at this time. Abscess drainage and infectious work up pending.   #Type 2 diabetes: On basal bolus insulin.     Recommendations:  - agree with IR-guided drainage of pericolonic abscess   - no immediate plan for colonoscopy at this time given possible colonic perforation or risk of subsequent perforation from endoscopic instrumentation - would repeat CT abdominal imaging after 3-4 days after drainage/drain placement    - cont Zosyn IV q8 for GNR and anaerobic coverage and follow up blood cultures  - no evidence of colitis on CT imaging so stool studies low yield at this time  - GI will continue to follow      Aracelis Chavez PGY-4  Gastroenterology Fellow  Pager #[phone] or [phone]

## 2020-05-04 NOTE — PROGRESS NOTE ADULT - SUBJECTIVE AND OBJECTIVE BOX
Surgery Progress Note  Patient is a 54y old  Female who presents with a chief complaint of Abdominal pain (03 May 2020 16:23)      SUBJECTIVE: Patient seen and examined at bedside with surgical team, patient without complaints.   Uncontrolled diabetes glucose as high as 510 on admission.   Endorses LLQ abdominal pain. NPO/IVF - denies nausea and vomiting.      Vital Signs Last 24 Hrs  T(C): 36.4 (04 May 2020 01:59), Max: 38.1 (03 May 2020 15:11)  T(F): 97.6 (04 May 2020 01:59), Max: 100.6 (03 May 2020 15:11)  HR: 88 (04 May 2020 01:59) (88 - 113)  BP: 116/86 (04 May 2020 01:59) (108/65 - 124/71)  BP(mean): --  RR: 18 (04 May 2020 01:59) (16 - 22)  SpO2: 99% (04 May 2020 01:59) (98% - 100%)    Physical Exam  Constitutional: NAD  Neuro: awake, alert   Respiratory: breathing comfortably on RA  Abd: soft, obese LLQ TTP, non-distended   Ext: moving all four ext spontaneously     I&O's Detail    03 May 2020 07:01  -  04 May 2020 02:42  --------------------------------------------------------  IN:  Total IN: 0 mL    OUT:    Voided: 400 mL  Total OUT: 400 mL    Total NET: -400 mL      MEDICATIONS  (STANDING):  acetaminophen  IVPB .. 1000 milliGRAM(s) IV Intermittent every 6 hours  dextrose 5%. 1000 milliLiter(s) (50 mL/Hr) IV Continuous <Continuous>  dextrose 50% Injectable 12.5 Gram(s) IV Push once  dextrose 50% Injectable 25 Gram(s) IV Push once  dextrose 50% Injectable 25 Gram(s) IV Push once  insulin glargine Injectable (LANTUS) 18 Unit(s) SubCutaneous at bedtime  insulin lispro (HumaLOG) corrective regimen sliding scale   SubCutaneous every 4 hours  piperacillin/tazobactam IVPB.. 3.375 Gram(s) IV Intermittent every 8 hours  sodium chloride 0.9%. 1000 milliLiter(s) (100 mL/Hr) IV Continuous <Continuous>    MEDICATIONS  (PRN):  dextrose 40% Gel 15 Gram(s) Oral once PRN Blood Glucose LESS THAN 70 milliGRAM(s)/deciliter  glucagon  Injectable 1 milliGRAM(s) IntraMuscular once PRN Glucose LESS THAN 70 milligrams/deciliter  HYDROmorphone  Injectable 0.5 milliGRAM(s) IV Push every 4 hours PRN Moderate Pain (4 - 6)      LABS:                        12.4   15.23 )-----------( 523      ( 03 May 2020 19:50 )             36.1     05-03    135  |  97<L>  |  11  ----------------------------<  393<H>  4.3   |  23  |  0.64    Ca    8.8      03 May 2020 19:50    TPro  7.3  /  Alb  3.2<L>  /  TBili  0.6  /  DBili  x   /  AST  8   /  ALT  11  /  AlkPhos  105  05-03    PT/INR - ( 03 May 2020 19:50 )   PT: 16.8 SEC;   INR: 1.44          PTT - ( 03 May 2020 19:50 )  PTT:35.6 SEC  LIVER FUNCTIONS - ( 03 May 2020 19:50 )  Alb: 3.2 g/dL / Pro: 7.3 g/dL / ALK PHOS: 105 u/L / ALT: 11 u/L / AST: 8 u/L / GGT: x           Urinalysis Basic - ( 03 May 2020 15:02 )    Color: YELLOW / Appearance: CLEAR / SG: > 1.040 / pH: 6.0  Gluc: >1000 / Ketone: MODERATE  / Bili: NEGATIVE / Urobili: NORMAL   Blood: NEGATIVE / Protein: 20 / Nitrite: NEGATIVE   Leuk Esterase: NEGATIVE / RBC: 0-2 / WBC 0-2   Sq Epi: OCC / Non Sq Epi: x / Bacteria: FEW      ABO Interpretation: A (05-03-20 @ 14:50)

## 2020-05-04 NOTE — CONSULT NOTE ADULT - PROBLEM SELECTOR RECOMMENDATION 9
-Patient with uncontrolled Type 2 Dm (A1C 12.9)  -Currently NPO. On lantus 18 units qhs and on a moderate bedtime scale every 4 hrs. Of note on presentation, had AG 24 and BHB 1.2, now AG improved to 15 and FS in 200s.  -Would increase lantus to 22 units qhs and change to a low dose correctional scale q6hrs while NPO. Once is transitioned to diet, will need addition of premeal humalog as well.  -DC plan likely basal/bolus regimen, doses to be determined. Patient will need prescription for glucometer, test strips, and lancets prior to dc as well.  -RD consult  -Patient will need education by bedside nurse on insulin pen, glucometer use prior to discharge.  -Patient can f/u with endocrinology at [address] [place] [address] [phone] -Patient with uncontrolled Type 2 Dm (A1C 12.9)  -Currently NPO. On lantus 18 units qhs and on a moderate bedtime scale every 4 hrs. Of note on presentation, had AG 24 and BHB 1.2, now AG improved to 15 and FS in 200s.  -Would increase lantus to 22 units qhs and change to a low dose correctional scale q6hrs while NPO. Once is transitioned to diet, will need addition of premeal humalog as well.  -repeat lactate level   -DC plan likely basal/bolus regimen, doses to be determined. Patient will need prescription for glucometer, test strips, and lancets prior to dc as well.  -RD consult  -Patient will need education by bedside nurse on insulin pen, glucometer use prior to discharge.  -Patient can f/u with endocrinology at [address] [place] [address] [phone]

## 2020-05-04 NOTE — PROGRESS NOTE ADULT - SUBJECTIVE AND OBJECTIVE BOX
Post op Check    S/p left lower quadrant IR drain placement.  Pt seen and examined without complaints. Pain is controlled. Denies SOB/CP/N/V.   Reports pain significantly improved after IR procedure.    Vital Signs Last 24 Hrs  T(C): 36.6 (04 May 2020 17:03), Max: 37.3 (04 May 2020 09:20)  T(F): 97.8 (04 May 2020 17:03), Max: 99.1 (04 May 2020 09:20)  HR: 101 (04 May 2020 17:03) (88 - 109)  BP: 112/74 (04 May 2020 17:03) (111/70 - 139/95)  BP(mean): --  RR: 17 (04 May 2020 17:03) (17 - 18)  SpO2: 96% (04 May 2020 17:03) (96% - 99%)    I&O's Summary    03 May 2020 07:01  -  04 May 2020 07:00  --------------------------------------------------------  IN: 0 mL / OUT: 400 mL / NET: -400 mL    04 May 2020 07:01  -  04 May 2020 19:28  --------------------------------------------------------  IN: 0 mL / OUT: 7.5 mL / NET: -7.5 mL        Physical Exam  Gen: NAD, A&Ox3  Pulm: No respiratory distress, no subcostal retractions  CV: RRR, no JVD  Abd: Soft, NT, ND, LLQ drain to bulb suction, minimal serous output                          12.3   18.94 )-----------( 605      ( 04 May 2020 06:35 )             39.1       05-04    134<L>  |  98  |  14  ----------------------------<  265<H>  4.0   |  21<L>  |  0.49<L>    Ca    9.3      04 May 2020 06:35  Phos  2.9     05-04  Mg     2.1     05-04    TPro  8.1  /  Alb  3.2<L>  /  TBili  0.6  /  DBili  x   /  AST  11  /  ALT  16  /  AlkPhos  119  05-04

## 2020-05-04 NOTE — CONSULT NOTE ADULT - SUBJECTIVE AND OBJECTIVE BOX
**Due to [facility] policy during the evolving novel coronavirus outbreak, efforts are being made to limit unnecessary patient contacts to limit the spread of disease. Accordingly, patients without clear indication for physical exam or face to face interview from the Endocrine Team will be adjusted in conjunction with conversations with primary provider team. This is being done for the safety of all patients we care for.    HPI:  54F presents with 3 days of acute abdominal pain located in LUQ. 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 This morning awoke with intense abdominal pain, N/V x 5 hours, came to [facility] ED. No HA, CP, SOB. +N/V, No D/C. No fevers at home (states sister measured temperature this morning). Has never had this pain before. Has never had a colonoscopy. Recently diagnosed with DM, does not like to stick herself with needles so does not check blood glucose at home. Takes a "small blue pill" for DM. (03 May 2020 16:23)    Endocrine History:  Patient reports a hx of Type 2 DM for past few months. Says does not know the name of medication she takes- a small blue pill, takes BID. Thinks may be 1000mg dosing (metformin?). Never been on insulin. Does not have a glucometer at home. Reports diet mainly of vegetables, but does have rice and juice occasionally Has not followed with eye doctor, no previous hx of retinopathy. Denies neuropathy. Currently with abd pain and N/V. No recent polyuria, polydipsia.     PAST MEDICAL & SURGICAL HISTORY:  Uncontrolled type 2 diabetes mellitus with complication  No significant past surgical history      FAMILY HISTORY: Mother and father with Type 2 DM      Social History: denies smoking, alcohol use    Outpatient Medications:  unable to obtain    MEDICATIONS  (STANDING):  acetaminophen  IVPB .. 1000 milliGRAM(s) IV Intermittent every 6 hours  dextrose 5%. 1000 milliLiter(s) (50 mL/Hr) IV Continuous <Continuous>  dextrose 50% Injectable 12.5 Gram(s) IV Push once  dextrose 50% Injectable 25 Gram(s) IV Push once  dextrose 50% Injectable 25 Gram(s) IV Push once  insulin glargine Injectable (LANTUS) 18 Unit(s) SubCutaneous at bedtime  insulin lispro (HumaLOG) corrective regimen sliding scale   SubCutaneous every 4 hours  piperacillin/tazobactam IVPB.. 3.375 Gram(s) IV Intermittent every 8 hours  sodium chloride 0.9%. 1000 milliLiter(s) (100 mL/Hr) IV Continuous <Continuous>    MEDICATIONS  (PRN):  dextrose 40% Gel 15 Gram(s) Oral once PRN Blood Glucose LESS THAN 70 milliGRAM(s)/deciliter  glucagon  Injectable 1 milliGRAM(s) IntraMuscular once PRN Glucose LESS THAN 70 milligrams/deciliter  HYDROmorphone  Injectable 0.5 milliGRAM(s) IV Push every 4 hours PRN Moderate Pain (4 - 6)      Allergies    No Known Allergies    Intolerances      Review of Systems:  Constitutional: No fever  Eyes: No blurry vision  Neuro: No tremors  HEENT: No pain  Cardiovascular: No chest pain, palpitations  Respiratory: No SOB, no cough  GI: + nausea, vomiting, abdominal pain  : No dysuria  Skin: no rash  Endocrine: no polyuria, polydipsia  Hem/lymph: no swelling  Osteoporosis: no fractures    ALL OTHER SYSTEMS REVIEWED AND NEGATIVE      PHYSICAL EXAM:  VITALS: T(C): 37.3 (05-04-20 @ 09:20)  T(F): 99.1 (05-04-20 @ 09:20), Max: 100.6 (05-03-20 @ 15:11)  HR: 100 (05-04-20 @ 09:20) (88 - 113)  BP: 117/79 (05-04-20 @ 09:20) (108/65 - 139/95)  RR:  (16 - 22)  SpO2:  (98% - 100%)  **Due to [facility] policy during the evolving novel coronavirus outbreak, efforts are being made to limit unnecessary patient contacts to limit the spread of disease. Accordingly, patients without clear indication for physical exam or face to face interview from the Endocrine Team will be adjusted in conjunction with conversations with primary provider team. This is being done for the safety of all patients we care for.    PSYCH: Alert and oriented x 3, normal affect, normal mood  Rest of physical exam as per surgery progress note 5/4    POCT Blood Glucose.: 230 mg/dL (05-04-20 @ 10:14)  POCT Blood Glucose.: 248 mg/dL (05-04-20 @ 06:23)  POCT Blood Glucose.: 270 mg/dL (05-04-20 @ 02:53)  POCT Blood Glucose.: 227 mg/dL (05-04-20 @ 00:24)  POCT Blood Glucose.: 195 mg/dL (05-03-20 @ 23:00)  POCT Blood Glucose.: 317 mg/dL (05-03-20 @ 20:31)  POCT Blood Glucose.: 413 mg/dL (05-03-20 @ 18:33)  POCT Blood Glucose.: 370 mg/dL (05-03-20 @ 17:53)  POCT Blood Glucose.: 417 mg/dL (05-03-20 @ 16:06)                            12.3   18.94 )-----------( 605      ( 04 May 2020 06:35 )             39.1       05-04    134<L>  |  98  |  14  ----------------------------<  265<H>  4.0   |  21<L>  |  0.49<L>    EGFR if : 130  EGFR if non : 112    Ca    9.3      05-04  Mg     2.1     05-04  Phos  2.9     05-04    TPro  8.1  /  Alb  3.2<L>  /  TBili  0.6  /  DBili  x   /  AST  11  /  ALT  16  /  AlkPhos  119  05-04                  Radiology: **Due to [facility] policy during the evolving novel coronavirus outbreak, efforts are being made to limit unnecessary patient contacts to limit the spread of disease. Accordingly, patients without clear indication for physical exam or face to face interview from the Endocrine Team will be adjusted in conjunction with conversations with primary provider team. This is being done for the safety of all patients we care for.    HPI:  54F presents with 3 days of acute abdominal pain located in LUQ. 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 This morning awoke with intense abdominal pain, N/V x 5 hours, came to [facility] ED. No HA, CP, SOB. +N/V, No D/C. No fevers at home (states sister measured temperature this morning). Has never had this pain before. Has never had a colonoscopy. Recently diagnosed with DM, does not like to stick herself with needles so does not check blood glucose at home. Takes a "small blue pill" for DM. (03 May 2020 16:23)    Endocrine History:  Hx as per phone conversation with pt    Patient reports a hx of Type 2 DM for past few months. Says does not know the name of medication she takes- a small blue pill, takes BID. Thinks may be 1000mg dosing (metformin?). Never been on insulin. Does not have a glucometer at home. Reports diet mainly of vegetables, but does have rice and juice occasionally Has not followed with eye doctor, no previous hx of retinopathy. Denies neuropathy. Currently with abd pain and N/V. No recent polyuria, polydipsia.     PAST MEDICAL & SURGICAL HISTORY:  Uncontrolled type 2 diabetes mellitus with complication  No significant past surgical history      FAMILY HISTORY: Mother and father with Type 2 DM      Social History: denies smoking, alcohol use    Outpatient Medications:  unable to obtain    MEDICATIONS  (STANDING):  acetaminophen  IVPB .. 1000 milliGRAM(s) IV Intermittent every 6 hours  dextrose 5%. 1000 milliLiter(s) (50 mL/Hr) IV Continuous <Continuous>  dextrose 50% Injectable 12.5 Gram(s) IV Push once  dextrose 50% Injectable 25 Gram(s) IV Push once  dextrose 50% Injectable 25 Gram(s) IV Push once  insulin glargine Injectable (LANTUS) 18 Unit(s) SubCutaneous at bedtime  insulin lispro (HumaLOG) corrective regimen sliding scale   SubCutaneous every 4 hours  piperacillin/tazobactam IVPB.. 3.375 Gram(s) IV Intermittent every 8 hours  sodium chloride 0.9%. 1000 milliLiter(s) (100 mL/Hr) IV Continuous <Continuous>    MEDICATIONS  (PRN):  dextrose 40% Gel 15 Gram(s) Oral once PRN Blood Glucose LESS THAN 70 milliGRAM(s)/deciliter  glucagon  Injectable 1 milliGRAM(s) IntraMuscular once PRN Glucose LESS THAN 70 milligrams/deciliter  HYDROmorphone  Injectable 0.5 milliGRAM(s) IV Push every 4 hours PRN Moderate Pain (4 - 6)      Allergies    No Known Allergies    Intolerances      Review of Systems:  Constitutional: No fever  Eyes: No blurry vision  Neuro: No tremors  HEENT: No pain  Cardiovascular: No chest pain, palpitations  Respiratory: No SOB, no cough  GI: + nausea, vomiting, abdominal pain  : No dysuria  Skin: no rash  Endocrine: no polyuria, polydipsia  Hem/lymph: no swelling  Osteoporosis: no fractures    ALL OTHER SYSTEMS REVIEWED AND NEGATIVE      PHYSICAL EXAM:  VITALS: T(C): 37.3 (05-04-20 @ 09:20)  T(F): 99.1 (05-04-20 @ 09:20), Max: 100.6 (05-03-20 @ 15:11)  HR: 100 (05-04-20 @ 09:20) (88 - 113)  BP: 117/79 (05-04-20 @ 09:20) (108/65 - 139/95)  RR:  (16 - 22)  SpO2:  (98% - 100%)  **Due to [facility] policy during the evolving novel coronavirus outbreak, efforts are being made to limit unnecessary patient contacts to limit the spread of disease. Accordingly, patients without clear indication for physical exam or face to face interview from the Endocrine Team will be adjusted in conjunction with conversations with primary provider team. This is being done for the safety of all patients we care for.    PSYCH: Alert and oriented x 3, normal affect, normal mood  Rest of physical exam as per surgery progress note 5/4    POCT Blood Glucose.: 230 mg/dL (05-04-20 @ 10:14)  POCT Blood Glucose.: 248 mg/dL (05-04-20 @ 06:23)  POCT Blood Glucose.: 270 mg/dL (05-04-20 @ 02:53)  POCT Blood Glucose.: 227 mg/dL (05-04-20 @ 00:24)  POCT Blood Glucose.: 195 mg/dL (05-03-20 @ 23:00)  POCT Blood Glucose.: 317 mg/dL (05-03-20 @ 20:31)  POCT Blood Glucose.: 413 mg/dL (05-03-20 @ 18:33)  POCT Blood Glucose.: 370 mg/dL (05-03-20 @ 17:53)  POCT Blood Glucose.: 417 mg/dL (05-03-20 @ 16:06)                            12.3   18.94 )-----------( 605      ( 04 May 2020 06:35 )             39.1       05-04    134<L>  |  98  |  14  ----------------------------<  265<H>  4.0   |  21<L>  |  0.49<L>    EGFR if : 130  EGFR if non : 112    Ca    9.3      05-04  Mg     2.1     05-04  Phos  2.9     05-04    TPro  8.1  /  Alb  3.2<L>  /  TBili  0.6  /  DBili  x   /  AST  11  /  ALT  16  /  AlkPhos  119  05-04                  Radiology: **Due to [facility] policy during the evolving novel coronavirus outbreak, efforts are being made to limit unnecessary patient contacts to limit the spread of disease. Accordingly, patients without clear indication for physical exam or face to face interview from the Endocrine Team will be adjusted in conjunction with conversations with primary provider team. This is being done for the safety of all patients we care for.    HPI:  54F presents with 3 days of acute abdominal pain located in LUQ. 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 This morning awoke with intense abdominal pain, N/V x 5 hours, came to [facility] ED. No HA, CP, SOB. +N/V, No D/C. No fevers at home (states sister measured temperature this morning). Has never had this pain before. Has never had a colonoscopy. Recently diagnosed with DM, does not like to stick herself with needles so does not check blood glucose at home. Takes a "small blue pill" for DM. (03 May 2020 16:23)    Endocrine History:  Hx as per phone conversation with pt    Patient reports a hx of Type 2 DM for past few months. Says does not know the name of medication she takes- a small blue pill, takes BID. Thinks may be 1000mg dosing (metformin?). Never been on insulin. Does not have a glucometer at home. Reports diet mainly of vegetables, but does have rice and juice occasionally Has not followed with eye doctor, no previous hx of retinopathy. Denies neuropathy. Currently with abd pain and N/V. No recent polyuria, polydipsia.     PAST MEDICAL & SURGICAL HISTORY:  Uncontrolled type 2 diabetes mellitus with complication  No significant past surgical history      FAMILY HISTORY: Mother and father with Type 2 DM      Social History: denies smoking, alcohol use    Outpatient Medications:  unable to obtain    MEDICATIONS  (STANDING):  acetaminophen  IVPB .. 1000 milliGRAM(s) IV Intermittent every 6 hours  dextrose 5%. 1000 milliLiter(s) (50 mL/Hr) IV Continuous <Continuous>  dextrose 50% Injectable 12.5 Gram(s) IV Push once  dextrose 50% Injectable 25 Gram(s) IV Push once  dextrose 50% Injectable 25 Gram(s) IV Push once  insulin glargine Injectable (LANTUS) 18 Unit(s) SubCutaneous at bedtime  insulin lispro (HumaLOG) corrective regimen sliding scale   SubCutaneous every 4 hours  piperacillin/tazobactam IVPB.. 3.375 Gram(s) IV Intermittent every 8 hours  sodium chloride 0.9%. 1000 milliLiter(s) (100 mL/Hr) IV Continuous <Continuous>    MEDICATIONS  (PRN):  dextrose 40% Gel 15 Gram(s) Oral once PRN Blood Glucose LESS THAN 70 milliGRAM(s)/deciliter  glucagon  Injectable 1 milliGRAM(s) IntraMuscular once PRN Glucose LESS THAN 70 milligrams/deciliter  HYDROmorphone  Injectable 0.5 milliGRAM(s) IV Push every 4 hours PRN Moderate Pain (4 - 6)      Allergies    No Known Allergies    Intolerances      Review of Systems:  Constitutional: No fever  Eyes: No blurry vision  Neuro: No tremors  HEENT: No pain  Cardiovascular: No chest pain, palpitations  Respiratory: No SOB, no cough  GI: + nausea, vomiting, abdominal pain  : No dysuria  Skin: no rash  Endocrine: no polyuria, polydipsia  Hem/lymph: no swelling  Osteoporosis: no fractures    ALL OTHER SYSTEMS REVIEWED AND NEGATIVE      PHYSICAL EXAM:  VITALS: T(C): 37.3 (05-04-20 @ 09:20)  T(F): 99.1 (05-04-20 @ 09:20), Max: 100.6 (05-03-20 @ 15:11)  HR: 100 (05-04-20 @ 09:20) (88 - 113)  BP: 117/79 (05-04-20 @ 09:20) (108/65 - 139/95)  RR:  (16 - 22)  SpO2:  (98% - 100%)  **Due to [facility] policy during the evolving novel coronavirus outbreak, efforts are being made to limit unnecessary patient contacts to limit the spread of disease. Accordingly, patients without clear indication for physical exam or face to face interview from the Endocrine Team will be adjusted in conjunction with conversations with primary provider team. This is being done for the safety of all patients we care for.    PSYCH: Alert and oriented x 3, normal affect, normal mood  Rest of physical exam as per surgery progress note 5/4  Constitutional: NAD  Neuro: awake, alert   Respiratory: breathing comfortably on RA  Abd: soft, obese LLQ TTP, non-distended   Ext: moving all four ext spontaneously       POCT Blood Glucose.: 230 mg/dL (05-04-20 @ 10:14)  POCT Blood Glucose.: 248 mg/dL (05-04-20 @ 06:23)  POCT Blood Glucose.: 270 mg/dL (05-04-20 @ 02:53)  POCT Blood Glucose.: 227 mg/dL (05-04-20 @ 00:24)  POCT Blood Glucose.: 195 mg/dL (05-03-20 @ 23:00)  POCT Blood Glucose.: 317 mg/dL (05-03-20 @ 20:31)  POCT Blood Glucose.: 413 mg/dL (05-03-20 @ 18:33)  POCT Blood Glucose.: 370 mg/dL (05-03-20 @ 17:53)  POCT Blood Glucose.: 417 mg/dL (05-03-20 @ 16:06)                            12.3   18.94 )-----------( 605      ( 04 May 2020 06:35 )             39.1       05-04    134<L>  |  98  |  14  ----------------------------<  265<H>  4.0   |  21<L>  |  0.49<L>    EGFR if : 130  EGFR if non : 112    Ca    9.3      05-04  Mg     2.1     05-04  Phos  2.9     05-04    TPro  8.1  /  Alb  3.2<L>  /  TBili  0.6  /  DBili  x   /  AST  11  /  ALT  16  /  AlkPhos  119  05-04                  Radiology: **Due to [facility] policy during the evolving novel coronavirus outbreak, efforts are being made to limit unnecessary patient contacts to limit the spread of disease. Accordingly, patients without clear indication for physical exam or face to face interview from the Endocrine Team will be adjusted in conjunction with conversations with primary provider team. This is being done for the safety of all patients we care for.    HPI:  54F presents with 3 days of acute abdominal pain located in LUQ. 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 This morning awoke with intense abdominal pain, N/V x 5 hours, came to [facility] ED. No HA, CP, SOB. +N/V, No D/C. No fevers at home (states sister measured temperature this morning). Has never had this pain before. Has never had a colonoscopy. Recently diagnosed with DM, does not like to stick herself with needles so does not check blood glucose at home. Takes a "small blue pill" for DM. (03 May 2020 16:23)    Endocrine History:  Hx as per phone conversation with pt    Patient reports a hx of Type 2 DM for past few months. Says does not know the name of medication she takes- a small blue pill, takes BID. Thinks may be 1000mg dosing (metformin?). Never been on insulin. Does not have a glucometer at home. Reports diet mainly of vegetables, but does have rice and juice occasionally Has not followed with eye doctor, no previous hx of retinopathy. Denies neuropathy. Currently with abd pain and N/V. No recent polyuria, polydipsia.     PAST MEDICAL & SURGICAL HISTORY:  Uncontrolled type 2 diabetes mellitus with complication  No significant past surgical history      FAMILY HISTORY: Mother and father with Type 2 DM      Social History: denies smoking, alcohol use    Outpatient Medications:  unable to obtain    MEDICATIONS  (STANDING):  acetaminophen  IVPB .. 1000 milliGRAM(s) IV Intermittent every 6 hours  dextrose 5%. 1000 milliLiter(s) (50 mL/Hr) IV Continuous <Continuous>  dextrose 50% Injectable 12.5 Gram(s) IV Push once  dextrose 50% Injectable 25 Gram(s) IV Push once  dextrose 50% Injectable 25 Gram(s) IV Push once  insulin glargine Injectable (LANTUS) 18 Unit(s) SubCutaneous at bedtime  insulin lispro (HumaLOG) corrective regimen sliding scale   SubCutaneous every 4 hours  piperacillin/tazobactam IVPB.. 3.375 Gram(s) IV Intermittent every 8 hours  sodium chloride 0.9%. 1000 milliLiter(s) (100 mL/Hr) IV Continuous <Continuous>    MEDICATIONS  (PRN):  dextrose 40% Gel 15 Gram(s) Oral once PRN Blood Glucose LESS THAN 70 milliGRAM(s)/deciliter  glucagon  Injectable 1 milliGRAM(s) IntraMuscular once PRN Glucose LESS THAN 70 milligrams/deciliter  HYDROmorphone  Injectable 0.5 milliGRAM(s) IV Push every 4 hours PRN Moderate Pain (4 - 6)      Allergies    No Known Allergies    Intolerances      Review of Systems:  Constitutional: No fever  Eyes: No blurry vision  Neuro: No tremors  HEENT: No pain  Cardiovascular: No chest pain, palpitations  Respiratory: No SOB, no cough  GI: + nausea, vomiting, abdominal pain  : No dysuria  Skin: no rash  Endocrine: no polyuria, polydipsia  Hem/lymph: no swelling  Osteoporosis: no fractures    ALL OTHER SYSTEMS REVIEWED AND NEGATIVE      PHYSICAL EXAM:  VITALS: T(C): 37.3 (05-04-20 @ 09:20)  T(F): 99.1 (05-04-20 @ 09:20), Max: 100.6 (05-03-20 @ 15:11)  HR: 100 (05-04-20 @ 09:20) (88 - 113)  BP: 117/79 (05-04-20 @ 09:20) (108/65 - 139/95)  RR:  (16 - 22)  SpO2:  (98% - 100%)  **Due to [facility] policy during the evolving novel coronavirus outbreak, efforts are being made to limit unnecessary patient contacts to limit the spread of disease. Accordingly, patients without clear indication for physical exam or face to face interview from the Endocrine Team will be adjusted in conjunction with conversations with primary provider team. This is being done for the safety of all patients we care for.    Exam as per surgery team 5/4/20  PSYCH: Alert and oriented x 3, normal affect, normal mood  Rest of physical exam as per surgery progress note 5/4  Constitutional: NAD  Neuro: awake, alert   Respiratory: breathing comfortably on RA  Abd: soft, obese LLQ TTP, non-distended   Ext: moving all four ext spontaneously       POCT Blood Glucose.: 230 mg/dL (05-04-20 @ 10:14)  POCT Blood Glucose.: 248 mg/dL (05-04-20 @ 06:23)  POCT Blood Glucose.: 270 mg/dL (05-04-20 @ 02:53)  POCT Blood Glucose.: 227 mg/dL (05-04-20 @ 00:24)  POCT Blood Glucose.: 195 mg/dL (05-03-20 @ 23:00)  POCT Blood Glucose.: 317 mg/dL (05-03-20 @ 20:31)  POCT Blood Glucose.: 413 mg/dL (05-03-20 @ 18:33)  POCT Blood Glucose.: 370 mg/dL (05-03-20 @ 17:53)  POCT Blood Glucose.: 417 mg/dL (05-03-20 @ 16:06)                            12.3   18.94 )-----------( 605      ( 04 May 2020 06:35 )             39.1       05-04    134<L>  |  98  |  14  ----------------------------<  265<H>  4.0   |  21<L>  |  0.49<L>    EGFR if : 130  EGFR if non : 112    Ca    9.3      05-04  Mg     2.1     05-04  Phos  2.9     05-04    TPro  8.1  /  Alb  3.2<L>  /  TBili  0.6  /  DBili  x   /  AST  11  /  ALT  16  /  AlkPhos  119  05-04        A1C with Estimated Average Glucose (05.03.20 @ 18:04)    A1C with Estimated Average Glucose: 12.9: High Risk (prediabetic)    5.7 - 6.4 %  Diabetic, diagnostic           > 6.5 %  ADA diabetic treatment goal    < 7.0 %    HbA1C values may not accurately reflect mean blood glucose  in patients with Hb variants.  Suggest clinical correlation. %              Radiology:

## 2020-05-04 NOTE — CONSULT NOTE ADULT - SUBJECTIVE AND OBJECTIVE BOX
Chief Complaint:  Patient is a 54y old  Female who presents with a chief complaint of Abdominal pain (04 May 2020 02:41)      HPI:    54F presents with 3 days of acute abdominal pain located in LUQ. 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 This morning awoke with intense abdominal pain, N/V x 5 hours, came to [facility] ED. No HA, CP, SOB. +N/V, No D/C. No fevers at home (states sister measured temperature this morning). Has never had this pain before. Has never had a colonoscopy.       Allergies:  No Known Allergies      Home Medications:        Hospital Medications:  acetaminophen  IVPB .. 1000 milliGRAM(s) IV Intermittent every 6 hours  dextrose 40% Gel 15 Gram(s) Oral once PRN  dextrose 5%. 1000 milliLiter(s) IV Continuous <Continuous>  dextrose 50% Injectable 12.5 Gram(s) IV Push once  dextrose 50% Injectable 25 Gram(s) IV Push once  dextrose 50% Injectable 25 Gram(s) IV Push once  glucagon  Injectable 1 milliGRAM(s) IntraMuscular once PRN  HYDROmorphone  Injectable 0.5 milliGRAM(s) IV Push every 4 hours PRN  insulin glargine Injectable (LANTUS) 18 Unit(s) SubCutaneous at bedtime  insulin lispro (HumaLOG) corrective regimen sliding scale   SubCutaneous every 4 hours  piperacillin/tazobactam IVPB.. 3.375 Gram(s) IV Intermittent every 8 hours  sodium chloride 0.9%. 1000 milliLiter(s) IV Continuous <Continuous>      PMHX/PSHX:  Uncontrolled type 2 diabetes mellitus with complication  No pertinent past medical history  No significant past surgical history      Family history:      Social History:     ROS:     General:  No weight loss, fevers, chills, night sweats, fatigue  Eyes:  No vision changes, no yellowing of eyes   ENT:  No throat pain, runny nose  CV:  No chest pain, palpitations  Resp:  No SOB, cough, wheezing  GI:  See HPI  :  No burning with urination, no hematuria   Muscle:  No muscle pain, weakness  Neuro:  No numbness/tingling, memory problems  Psych:  No fatigue, insomnia, mood problems  Heme:  No easy bruisability  Skin:  No rash, itching       PHYSICAL EXAM:     GENERAL:  Appears stated age, well-groomed, well-nourished, no distress  HEENT:  NC/AT,  conjunctivae clear and pink,  no JVD  CHEST:  Full & symmetric excursion, no increased effort, breath sounds clear  HEART:  Regular rhythm, S1, S2, no murmur/rub/S3/S4, no abdominal bruit, no edema  ABDOMEN:  Soft, non-tender, non-distended, normoactive bowel sounds,  no masses ,  EXTREMITIES:  no cyanosis,clubbing or edema  SKIN:  No rash/erythema/ecchymoses/petechiae/wounds/abscess/warm/dry  NEURO:  Alert, oriented    Vital Signs:  Vital Signs Last 24 Hrs  T(C): 37.3 (04 May 2020 09:20), Max: 38.1 (03 May 2020 15:11)  T(F): 99.1 (04 May 2020 09:20), Max: 100.6 (03 May 2020 15:11)  HR: 100 (04 May 2020 09:20) (88 - 113)  BP: 117/79 (04 May 2020 09:20) (108/65 - 139/95)  BP(mean): --  RR: 17 (04 May 2020 09:20) (16 - 22)  SpO2: 98% (04 May 2020 09:20) (98% - 100%)  Daily Height in cm: 152.4 (03 May 2020 19:31)    Daily     LABS:                        12.3   18.94 )-----------( 605      ( 04 May 2020 06:35 )             39.1     05-04    134<L>  |  98  |  14  ----------------------------<  265<H>  4.0   |  21<L>  |  0.49<L>    Ca    9.3      04 May 2020 06:35  Phos  2.9     05-04  Mg     2.1     05-04    TPro  8.1  /  Alb  3.2<L>  /  TBili  0.6  /  DBili  x   /  AST  11  /  ALT  16  /  AlkPhos  119  05-04    LIVER FUNCTIONS - ( 04 May 2020 06:35 )  Alb: 3.2 g/dL / Pro: 8.1 g/dL / ALK PHOS: 119 u/L / ALT: 16 u/L / AST: 11 u/L / GGT: x           PT/INR - ( 03 May 2020 19:50 )   PT: 16.8 SEC;   INR: 1.44     PTT - ( 03 May 2020 19:50 )  PTT:35.6 SEC  Urinalysis Basic - ( 03 May 2020 15:02 )    Color: YELLOW / Appearance: CLEAR / SG: > 1.040 / pH: 6.0  Gluc: >1000 / Ketone: MODERATE  / Bili: NEGATIVE / Urobili: NORMAL   Blood: NEGATIVE / Protein: 20 / Nitrite: NEGATIVE   Leuk Esterase: NEGATIVE / RBC: 0-2 / WBC 0-2   Sq Epi: OCC / Non Sq Epi: x / Bacteria: FEW      Amylase Serum--      Lipase serum13.3       Ammonia--      Imaging: Chief Complaint:  Patient is a 54y old  Female who presents with a chief complaint of Abdominal pain (04 May 2020 02:41)      HPI:    54 year old woman without PMH who presented with 3 days of acute abdominal pain located in LUQ with associated nausea and vomiting, found to have large pericolonic abscess, and pending IR-guided drainage. Gastroenterology consulted for colonoscopy to rule out underlying malignancy. Patient reports crampy pain localized to lower abdomen, initially predominantly on left lower abdomen, and now involving the right lower abdomen. Pain worsened withto stabbing quality on day prior to presentation. No associated diarrhea, blood in stools or other changes in bowel movements. No subjective or recorded fevers at home however patient admits to cold sweats on day prior to presentation. Patient states she was healthy without abdominal pain like this previously. She denies weight loss or family history of GI malignancy. No history of bloody stools, melena or prior colonoscopy. Since admission to hospital and initiative of IV antibiotics, her abdominal pain has significantly improved without recurrence of sweats.          Allergies:  No Known Allergies      Home Medications:        Hospital Medications:  acetaminophen  IVPB .. 1000 milliGRAM(s) IV Intermittent every 6 hours  dextrose 40% Gel 15 Gram(s) Oral once PRN  dextrose 5%. 1000 milliLiter(s) IV Continuous <Continuous>  dextrose 50% Injectable 12.5 Gram(s) IV Push once  dextrose 50% Injectable 25 Gram(s) IV Push once  dextrose 50% Injectable 25 Gram(s) IV Push once  glucagon  Injectable 1 milliGRAM(s) IntraMuscular once PRN  HYDROmorphone  Injectable 0.5 milliGRAM(s) IV Push every 4 hours PRN  insulin glargine Injectable (LANTUS) 18 Unit(s) SubCutaneous at bedtime  insulin lispro (HumaLOG) corrective regimen sliding scale   SubCutaneous every 4 hours  piperacillin/tazobactam IVPB.. 3.375 Gram(s) IV Intermittent every 8 hours  sodium chloride 0.9%. 1000 milliLiter(s) IV Continuous <Continuous>      PMHX/PSHX:  Uncontrolled type 2 diabetes mellitus with complication  No pertinent past medical history  No significant past surgical history      Family history:  No colon cancer, gastric cancer, esophageal cancer.     Social History: No tobacco or alcohol use.     ROS:     General:  No weight loss, fevers, chills, night sweats, fatigue  Eyes:  No vision changes, no yellowing of eyes   ENT:  No throat pain, runny nose  CV:  No chest pain, palpitations  Resp:  No SOB, cough, wheezing  GI:  See HPI  :  No burning with urination, no hematuria   Muscle:  No muscle pain, weakness  Neuro:  No numbness/tingling, memory problems  Psych:  No fatigue, insomnia, mood problems  Heme:  No easy bruisability  Skin:  No rash, itching       PHYSICAL EXAM:     GENERAL:  Appears stated age, well-groomed, well-nourished, no distress  HEENT:  Conjunctivae clear and pink,  no JVD  CHEST:  Full & symmetric excursion, no increased effort, breath sounds clear  HEART:  Regular rhythm and rate   ABDOMEN:  Obese, soft,  non-distended, no guarding +RLQ and LUQ TTP  EXTREMITIES:  no cyanosis, clubbing or LE edema  SKIN:  No rash/erythema/ecchymoses/petechiae/wounds/abscess/warm/dry  NEURO:  Alert, orientedx3      Vital Signs:  Vital Signs Last 24 Hrs  T(C): 37.3 (04 May 2020 09:20), Max: 38.1 (03 May 2020 15:11)  T(F): 99.1 (04 May 2020 09:20), Max: 100.6 (03 May 2020 15:11)  HR: 100 (04 May 2020 09:20) (88 - 113)  BP: 117/79 (04 May 2020 09:20) (108/65 - 139/95)  BP(mean): --  RR: 17 (04 May 2020 09:20) (16 - 22)  SpO2: 98% (04 May 2020 09:20) (98% - 100%)  Daily Height in cm: 152.4 (03 May 2020 19:31)    Daily       LABS:                        12.3   18.94 )-----------( 605      ( 04 May 2020 06:35 )             39.1     05-04    134<L>  |  98  |  14  ----------------------------<  265<H>  4.0   |  21<L>  |  0.49<L>    Ca    9.3      04 May 2020 06:35  Phos  2.9     05-04  Mg     2.1     05-04    TPro  8.1  /  Alb  3.2<L>  /  TBili  0.6  /  DBili  x   /  AST  11  /  ALT  16  /  AlkPhos  119  05-04    LIVER FUNCTIONS - ( 04 May 2020 06:35 )  Alb: 3.2 g/dL / Pro: 8.1 g/dL / ALK PHOS: 119 u/L / ALT: 16 u/L / AST: 11 u/L / GGT: x           PT/INR - ( 03 May 2020 19:50 )   PT: 16.8 SEC;   INR: 1.44     PTT - ( 03 May 2020 19:50 )  PTT:35.6 SEC  Urinalysis Basic - ( 03 May 2020 15:02 )    Color: YELLOW / Appearance: CLEAR / SG: > 1.040 / pH: 6.0  Gluc: >1000 / Ketone: MODERATE  / Bili: NEGATIVE / Urobili: NORMAL   Blood: NEGATIVE / Protein: 20 / Nitrite: NEGATIVE   Leuk Esterase: NEGATIVE / RBC: 0-2 / WBC 0-2   Sq Epi: OCC / Non Sq Epi: x / Bacteria: FEW      Amylase Serum--      Lipase serum13.3       Ammonia--    Imaging:      EXAM:  CT ABDOMEN AND PELVIS IC      PROCEDURE DATE:  May  3 2020       INTERPRETATION:  CLINICAL INFORMATION: Diffuse abdominal pain.    COMPARISON: None.        IMPRESSION:     4.5 x 4.3 x 2.5 cm fluid and gas containing collection with rim abutting the anterior aspect of the descending colon and the left upper quadrant (2:49). There is surrounding inflammatory change. Findings are suggestive of complicated diverticulitis with abscess formation. However no discrete colonic diverticula are present. Malignancy is not excluded from the differential.    No free intraperitoneal air.

## 2020-05-04 NOTE — CONSULT NOTE ADULT - ATTENDING COMMENTS
History/PE as above. Acute onset diffuse abdominal pain noted of 3 days duration. Most severe on left side. CT noted for pericolic collection adjacent to descending colon prompting suspicion of possible perforated diverticulitis versus neoplastic process. However, CT without definite indication as to etiology. Plan is for IR drainage which will permit assessment as to fluid collection in relationship to colonic lumen and potentially elucidate the underlying pathology. For now, monitor CBC and continue IV antibiotics. Pending  IR assessment will determine role and timing of colonoscopy.
DM2 uncontrolled HbA1c 12.9%, agree with basal bolus insulin plan inpatient and likely for dc as well. New to insulin at discharge will need DM education and outpatient endocrine follow up.    Carrol Almonte MD  Division of Endocrinology  Pager: [phone]    If after 6PM or before 9AM, or on weekends/holidays, please call endocrine answering service for assistance ([phone]).  For nonurgent matters email [email or web address] for assistance.

## 2020-05-04 NOTE — CONSULT NOTE ADULT - ASSESSMENT
54F F with uncontrolled Type 2 DM (A1C 12.9), presents with 3 days of acute abdominal pain located in LUQ. Admitted with colonic perforation. 54F F with uncontrolled Type 2 DM (A1C 12.9), presents with 3 days of acute abdominal pain located in LUQ. Admitted with colonic perforation.   Endocrine team consulted for uncontrolled diabetes. Patient is high risk with high level decision making due to uncontrolled diabetes which places patient at high risk for cardiovascular and cerebrovascular events. Patient with lability of glucose requiring close monitoring and insulin adjustments.

## 2020-05-04 NOTE — PROGRESS NOTE ADULT - ASSESSMENT
Assessment	  54F with perforated descending colon, possible malignant in origin vs diverticulitis although no evidence of diverticular disease. Currently hemodynamically stable with hyperglycemia (borderlines DKA).    - covid pending   - IR consulted for percutaneous drainage - collection seen on CT   - GI consultation for colonoscopy - patient never had colonoscopy before - perf possibly secondary to cancer    -- cea 1.0 WNl  - Endocrine consulted for diabetes education management - patient afraid to "stick her self" at home so dose not have accurate monitoring of blood sugars   -- A1C 12.9    -- anion gap of unknown origin - beta- hydroxybuterate  1.2   - NPO/IVF

## 2020-05-04 NOTE — PROGRESS NOTE ADULT - ASSESSMENT
A/P: 54y Female s/p IR placement of LLQ drain.  - Resume DVT prophylaxis  - OOB  - Strict I&O's  - Analgesia and antiemetics as needed  - Diet: CLD  - AM labs

## 2020-05-04 NOTE — CHART NOTE - NSCHARTNOTEFT_GEN_A_CORE
Pre-Interventional Radiology Procedure Note    54y    Female    Procedure: Drainage abdominal fluid collection    Diagnosis/Indication: Patient is a 54y old  Female who presents with a chief complaint of Abdominal pain (04 May 2020 02:41)      Interventional Radiology Attending Physician: Dr. Andrade    Ordering Attending Physician: Dr. Preston    PAST MEDICAL & SURGICAL HISTORY:  Uncontrolled type 2 diabetes mellitus with complication  No significant past surgical history       CBC Full  -  ( 04 May 2020 06:35 )  WBC Count : 18.94 K/uL  RBC Count : 4.86 M/uL  Hemoglobin : 12.3 g/dL  Hematocrit : 39.1 %  Platelet Count - Automated : 605 K/uL  Mean Cell Volume : 80.5 fL  Mean Cell Hemoglobin : 25.3 pg  Mean Cell Hemoglobin Concentration : 31.5 %  Auto Neutrophil # : 17.20 K/uL  Auto Lymphocyte # : 1.16 K/uL  Auto Monocyte # : 0.42 K/uL  Auto Eosinophil # : 0.00 K/uL  Auto Basophil # : 0.05 K/uL  Auto Neutrophil % : 90.8 %  Auto Lymphocyte % : 6.1 %  Auto Monocyte % : 2.2 %  Auto Eosinophil % : 0.0 %  Auto Basophil % : 0.3 %    05-04    134<L>  |  98  |  14  ----------------------------<  265<H>  4.0   |  21<L>  |  0.49<L>    Ca    9.3      04 May 2020 06:35  Phos  2.9     05-04  Mg     2.1     05-04    TPro  8.1  /  Alb  3.2<L>  /  TBili  0.6  /  DBili  x   /  AST  11  /  ALT  16  /  AlkPhos  119  05-04    PT/INR - ( 03 May 2020 19:50 )   PT: 16.8 SEC;   INR: 1.44          PTT - ( 03 May 2020 19:50 )  PTT:35.6 SEC

## 2020-05-05 LAB
ALBUMIN SERPL ELPH-MCNC: 2.9 G/DL — LOW (ref 3.3–5)
ALP SERPL-CCNC: 108 U/L — SIGNIFICANT CHANGE UP (ref 40–120)
ALT FLD-CCNC: 12 U/L — SIGNIFICANT CHANGE UP (ref 4–33)
ANION GAP SERPL CALC-SCNC: 15 MMO/L — HIGH (ref 7–14)
AST SERPL-CCNC: 9 U/L — SIGNIFICANT CHANGE UP (ref 4–32)
BILIRUB SERPL-MCNC: 0.4 MG/DL — SIGNIFICANT CHANGE UP (ref 0.2–1.2)
BUN SERPL-MCNC: 12 MG/DL — SIGNIFICANT CHANGE UP (ref 7–23)
CALCIUM SERPL-MCNC: 8.8 MG/DL — SIGNIFICANT CHANGE UP (ref 8.4–10.5)
CHLORIDE SERPL-SCNC: 96 MMOL/L — LOW (ref 98–107)
CO2 SERPL-SCNC: 21 MMOL/L — LOW (ref 22–31)
CREAT SERPL-MCNC: 0.41 MG/DL — LOW (ref 0.5–1.3)
GLUCOSE BLDC GLUCOMTR-MCNC: 244 MG/DL — HIGH (ref 70–99)
GLUCOSE BLDC GLUCOMTR-MCNC: 260 MG/DL — HIGH (ref 70–99)
GLUCOSE BLDC GLUCOMTR-MCNC: 276 MG/DL — HIGH (ref 70–99)
GLUCOSE BLDC GLUCOMTR-MCNC: 284 MG/DL — HIGH (ref 70–99)
GLUCOSE SERPL-MCNC: 267 MG/DL — HIGH (ref 70–99)
GRAM STN FLD: SIGNIFICANT CHANGE UP
HCT VFR BLD CALC: 35.5 % — SIGNIFICANT CHANGE UP (ref 34.5–45)
HGB BLD-MCNC: 11.2 G/DL — LOW (ref 11.5–15.5)
LACTATE SERPL-SCNC: 1.2 MMOL/L — SIGNIFICANT CHANGE UP (ref 0.5–2)
MAGNESIUM SERPL-MCNC: 2 MG/DL — SIGNIFICANT CHANGE UP (ref 1.6–2.6)
MCHC RBC-ENTMCNC: 25.5 PG — LOW (ref 27–34)
MCHC RBC-ENTMCNC: 31.5 % — LOW (ref 32–36)
MCV RBC AUTO: 80.9 FL — SIGNIFICANT CHANGE UP (ref 80–100)
NRBC # FLD: 0 K/UL — SIGNIFICANT CHANGE UP (ref 0–0)
PHOSPHATE SERPL-MCNC: 2.1 MG/DL — LOW (ref 2.5–4.5)
PLATELET # BLD AUTO: 581 K/UL — HIGH (ref 150–400)
PMV BLD: 10.1 FL — SIGNIFICANT CHANGE UP (ref 7–13)
POTASSIUM SERPL-MCNC: 4.5 MMOL/L — SIGNIFICANT CHANGE UP (ref 3.5–5.3)
POTASSIUM SERPL-SCNC: 4.5 MMOL/L — SIGNIFICANT CHANGE UP (ref 3.5–5.3)
PROT SERPL-MCNC: 7.1 G/DL — SIGNIFICANT CHANGE UP (ref 6–8.3)
RBC # BLD: 4.39 M/UL — SIGNIFICANT CHANGE UP (ref 3.8–5.2)
RBC # FLD: 15.1 % — HIGH (ref 10.3–14.5)
SODIUM SERPL-SCNC: 132 MMOL/L — LOW (ref 135–145)
SPECIMEN SOURCE: SIGNIFICANT CHANGE UP
WBC # BLD: 21.76 K/UL — HIGH (ref 3.8–10.5)
WBC # FLD AUTO: 21.76 K/UL — HIGH (ref 3.8–10.5)

## 2020-05-05 PROCEDURE — 74018 RADEX ABDOMEN 1 VIEW: CPT | Mod: 26

## 2020-05-05 PROCEDURE — 99231 SBSQ HOSP IP/OBS SF/LOW 25: CPT

## 2020-05-05 PROCEDURE — 99232 SBSQ HOSP IP/OBS MODERATE 35: CPT | Mod: GC

## 2020-05-05 PROCEDURE — 99232 SBSQ HOSP IP/OBS MODERATE 35: CPT

## 2020-05-05 RX ORDER — HYDROMORPHONE HYDROCHLORIDE 2 MG/ML
0.5 INJECTION INTRAMUSCULAR; INTRAVENOUS; SUBCUTANEOUS ONCE
Refills: 0 | Status: DISCONTINUED | OUTPATIENT
Start: 2020-05-05 | End: 2020-05-06

## 2020-05-05 RX ORDER — INSULIN GLARGINE 100 [IU]/ML
26 INJECTION, SOLUTION SUBCUTANEOUS AT BEDTIME
Refills: 0 | Status: DISCONTINUED | OUTPATIENT
Start: 2020-05-05 | End: 2020-05-07

## 2020-05-05 RX ORDER — OXYCODONE HYDROCHLORIDE 5 MG/1
5 TABLET ORAL EVERY 6 HOURS
Refills: 0 | Status: DISCONTINUED | OUTPATIENT
Start: 2020-05-05 | End: 2020-05-08

## 2020-05-05 RX ORDER — INSULIN LISPRO 100/ML
VIAL (ML) SUBCUTANEOUS
Refills: 0 | Status: DISCONTINUED | OUTPATIENT
Start: 2020-05-05 | End: 2020-05-06

## 2020-05-05 RX ORDER — ACETAMINOPHEN 500 MG
650 TABLET ORAL EVERY 6 HOURS
Refills: 0 | Status: DISCONTINUED | OUTPATIENT
Start: 2020-05-05 | End: 2020-05-08

## 2020-05-05 RX ORDER — ACETAMINOPHEN 500 MG
650 TABLET ORAL EVERY 6 HOURS
Refills: 0 | Status: DISCONTINUED | OUTPATIENT
Start: 2020-05-05 | End: 2020-05-05

## 2020-05-05 RX ORDER — INSULIN LISPRO 100/ML
VIAL (ML) SUBCUTANEOUS EVERY 6 HOURS
Refills: 0 | Status: DISCONTINUED | OUTPATIENT
Start: 2020-05-05 | End: 2020-05-05

## 2020-05-05 RX ORDER — DEXTROSE MONOHYDRATE, SODIUM CHLORIDE, AND POTASSIUM CHLORIDE 50; .745; 4.5 G/1000ML; G/1000ML; G/1000ML
1000 INJECTION, SOLUTION INTRAVENOUS
Refills: 0 | Status: DISCONTINUED | OUTPATIENT
Start: 2020-05-05 | End: 2020-05-07

## 2020-05-05 RX ADMIN — SODIUM CHLORIDE 100 MILLILITER(S): 9 INJECTION INTRAMUSCULAR; INTRAVENOUS; SUBCUTANEOUS at 08:58

## 2020-05-05 RX ADMIN — Medication 85 MILLIMOLE(S): at 11:54

## 2020-05-05 RX ADMIN — PIPERACILLIN AND TAZOBACTAM 25 GRAM(S): 4; .5 INJECTION, POWDER, LYOPHILIZED, FOR SOLUTION INTRAVENOUS at 18:11

## 2020-05-05 RX ADMIN — Medication 6: at 18:10

## 2020-05-05 RX ADMIN — DEXTROSE MONOHYDRATE, SODIUM CHLORIDE, AND POTASSIUM CHLORIDE 90 MILLILITER(S): 50; .745; 4.5 INJECTION, SOLUTION INTRAVENOUS at 18:11

## 2020-05-05 RX ADMIN — Medication 3: at 11:54

## 2020-05-05 RX ADMIN — Medication 650 MILLIGRAM(S): at 18:10

## 2020-05-05 RX ADMIN — INSULIN GLARGINE 26 UNIT(S): 100 INJECTION, SOLUTION SUBCUTANEOUS at 22:12

## 2020-05-05 RX ADMIN — DEXTROSE MONOHYDRATE, SODIUM CHLORIDE, AND POTASSIUM CHLORIDE 90 MILLILITER(S): 50; .745; 4.5 INJECTION, SOLUTION INTRAVENOUS at 22:13

## 2020-05-05 RX ADMIN — PIPERACILLIN AND TAZOBACTAM 25 GRAM(S): 4; .5 INJECTION, POWDER, LYOPHILIZED, FOR SOLUTION INTRAVENOUS at 08:52

## 2020-05-05 RX ADMIN — Medication 650 MILLIGRAM(S): at 23:42

## 2020-05-05 RX ADMIN — Medication 2: at 08:50

## 2020-05-05 RX ADMIN — ENOXAPARIN SODIUM 40 MILLIGRAM(S): 100 INJECTION SUBCUTANEOUS at 11:54

## 2020-05-05 RX ADMIN — OXYCODONE HYDROCHLORIDE 5 MILLIGRAM(S): 5 TABLET ORAL at 13:31

## 2020-05-05 NOTE — DIETITIAN INITIAL EVALUATION ADULT. - ENERGY NEEDS
Ht: 152.4cm. Wt: 88.45kg. BMI 38.1. # +/-10%.  Skin: No pressure injuries, noted LLQ drain to bulb suction.  Edema: None.

## 2020-05-05 NOTE — DIETITIAN INITIAL EVALUATION ADULT. - ADD RECOMMEND
1. Advance diet as tolerated when medically appropriate. 2. Recommend Consistent Carbohydrate diet as targeted diet for better glycemic control. 3. Monitor weights, labs, BM's, skin integrity, PO intake/tolerance. 4. Reinforce diet education with patient as needed. 5. Patient to follow up with an outpatient endocrinologist/RD after discharge.

## 2020-05-05 NOTE — PROGRESS NOTE ADULT - ASSESSMENT
54F F with uncontrolled Type 2 DM (A1C 12.9%), presents with 3 days of acute abdominal pain located in LUQ. Admitted with colonic perforation.   Endocrine team consulted for uncontrolled diabetes. Patient is high risk with high level decision making due to uncontrolled diabetes which places patient at high risk for cardiovascular and cerebrovascular events. Patient with lability of glucose requiring close monitoring and insulin adjustments.      Problem/Recommendation - 1:  Problem: Type 2 diabetes mellitus with hyperglycemia, without long-term current use of insulin. Recommendation:   -Patient with uncontrolled Type 2 Dm (A1C 12.9%)   -Currently on clear liquid diet. BG running high. Some juice intake.  Add Humalog 3/3/3  Increase Lantus to 26 units qhs  Continue low scale premeal and add low bedtime scale.  -Would increase lantus to 22 units qhs and change to a low dose correctional scale q6hrs while NPO. Once is transitioned to diet, will need addition of premeal humalog as well.  -repeat lactate level   -DC plan likely basal/bolus regimen, doses to be determined. Patient will need prescription for glucometer, test strips, and lancets prior to dc as well.  -RD consult  -Patient will need education by bedside nurse on insulin pen, glucometer use prior to discharge.  -Patient can f/u with endocrinology at [address] [place] [address] [phone].    Problem/Recommendation - 2:  ·  Problem: Hypertension.  Recommendation: -BP at goal <130/80 off meds. Monitor BP.     Problem/Recommendation - 3:  ·  Problem: Hyperlipidemia.  Recommendation: -can check outpatient lipid profile. 54F F with uncontrolled Type 2 DM (A1C 12.9%), presents with 3 days of acute abdominal pain located in LUQ. Admitted with colonic perforation.   Endocrine team consulted for uncontrolled diabetes. Patient is high risk with high level decision making due to uncontrolled diabetes which places patient at high risk for cardiovascular and cerebrovascular events. Patient with lability of glucose requiring close monitoring and insulin adjustments.      Problem/Recommendation - 1:  Problem: Type 2 diabetes mellitus with hyperglycemia, without long-term current use of insulin. Recommendation:   -Patient with uncontrolled Type 2 Dm (A1C 12.9%)   -Currently on clear liquid diet. BG running high. Some juice intake. But now changed to NPO status for abdominal workup.  Increase Lantus to 26 units qhs  Change to moderate scale q6h  -DC plan likely basal/bolus regimen, doses to be determined. Patient will need prescription for glucometer, test strips, and lancets prior to dc as well.  -Patient will need education by bedside nurse on insulin pen, glucometer use prior to discharge.  -Patient can f/u with endocrinology at [address] [place] [address] [phone].    Problem/Recommendation - 2:  ·  Problem: Hypertension.  Recommendation: -BP at goal <130/80 off meds. Monitor BP.     Problem/Recommendation - 3:  ·  Problem: Hyperlipidemia.  Recommendation: -can check outpatient lipid profile.

## 2020-05-05 NOTE — PROGRESS NOTE ADULT - SUBJECTIVE AND OBJECTIVE BOX
Chief Complaint: DM2    History: On clear liquid diet - she reports having a juice either last night or this morning and a soup for lunch.  No hypoglycemia events noted. She has been having abdominal pain, improved with pain medication.    MEDICATIONS  (STANDING):  acetaminophen   Tablet .. 650 milliGRAM(s) Oral every 6 hours  dextrose 5%. 1000 milliLiter(s) (50 mL/Hr) IV Continuous <Continuous>  dextrose 50% Injectable 12.5 Gram(s) IV Push once  dextrose 50% Injectable 25 Gram(s) IV Push once  dextrose 50% Injectable 25 Gram(s) IV Push once  enoxaparin Injectable 40 milliGRAM(s) SubCutaneous daily  insulin glargine Injectable (LANTUS) 22 Unit(s) SubCutaneous at bedtime  insulin lispro (HumaLOG) corrective regimen sliding scale   SubCutaneous three times a day with meals  piperacillin/tazobactam IVPB.. 3.375 Gram(s) IV Intermittent every 8 hours  sodium chloride 0.9%. 1000 milliLiter(s) (100 mL/Hr) IV Continuous <Continuous>    MEDICATIONS  (PRN):  dextrose 40% Gel 15 Gram(s) Oral once PRN Blood Glucose LESS THAN 70 milliGRAM(s)/deciliter  glucagon  Injectable 1 milliGRAM(s) IntraMuscular once PRN Glucose LESS THAN 70 milligrams/deciliter  HYDROmorphone  Injectable 0.5 milliGRAM(s) IV Push once PRN Mild Pain (1 - 3)  oxyCODONE    IR 5 milliGRAM(s) Oral every 6 hours PRN Moderate Pain (4 - 6)      Allergies    No Known Allergies    Intolerances      Review of Systems:    ALL OTHER SYSTEMS REVIEWED AND NEGATIVE      PHYSICAL EXAM:  VITALS: T(C): 36.6 (05-05-20 @ 13:15)  T(F): 97.8 (05-05-20 @ 13:15), Max: 98.2 (05-04-20 @ 21:55)  HR: 82 (05-05-20 @ 13:15) (82 - 101)  BP: 126/64 (05-05-20 @ 13:15) (112/74 - 126/80)  RR:  (16 - 18)  SpO2:  (96% - 100%)  Wt(kg): --  GENERAL: NAD, well-groomed, well-developed  EYES: No proptosis, no lid lag, anicteric  HEENT:  Atraumatic, Normocephalic, moist mucous membranes  PSYCH: Alert and oriented x 3, normal affect, normal mood    CAPILLARY BLOOD GLUCOSE      POCT Blood Glucose.: 260 mg/dL (05 May 2020 11:33)  POCT Blood Glucose.: 244 mg/dL (05 May 2020 07:51)  POCT Blood Glucose.: 259 mg/dL (04 May 2020 22:17)  POCT Blood Glucose.: 234 mg/dL (04 May 2020 17:59)      05-05    132<L>  |  96<L>  |  12  ----------------------------<  267<H>  4.5   |  21<L>  |  0.41<L>    EGFR if : 138  EGFR if non : 119    Ca    8.8      05-05  Mg     2.0     05-05  Phos  2.1     05-05    TPro  7.1  /  Alb  2.9<L>  /  TBili  0.4  /  DBili  x   /  AST  9   /  ALT  12  /  AlkPhos  108  05-05      A1C with Estimated Average Glucose: 12.9 % (05-03-20 @ 18:04)      Thyroid Function Tests: Chief Complaint: DM2    History: On clear liquid diet - she reports having a juice either last night or this morning and a soup for lunch.  No hypoglycemia events noted. She has been having abdominal pain, given pain medication.  Per primary team making her NPO for further eval of abdomen.    MEDICATIONS  (STANDING):  acetaminophen   Tablet .. 650 milliGRAM(s) Oral every 6 hours  dextrose 5%. 1000 milliLiter(s) (50 mL/Hr) IV Continuous <Continuous>  dextrose 50% Injectable 12.5 Gram(s) IV Push once  dextrose 50% Injectable 25 Gram(s) IV Push once  dextrose 50% Injectable 25 Gram(s) IV Push once  enoxaparin Injectable 40 milliGRAM(s) SubCutaneous daily  insulin glargine Injectable (LANTUS) 22 Unit(s) SubCutaneous at bedtime  insulin lispro (HumaLOG) corrective regimen sliding scale   SubCutaneous three times a day with meals  piperacillin/tazobactam IVPB.. 3.375 Gram(s) IV Intermittent every 8 hours  sodium chloride 0.9%. 1000 milliLiter(s) (100 mL/Hr) IV Continuous <Continuous>    MEDICATIONS  (PRN):  dextrose 40% Gel 15 Gram(s) Oral once PRN Blood Glucose LESS THAN 70 milliGRAM(s)/deciliter  glucagon  Injectable 1 milliGRAM(s) IntraMuscular once PRN Glucose LESS THAN 70 milligrams/deciliter  HYDROmorphone  Injectable 0.5 milliGRAM(s) IV Push once PRN Mild Pain (1 - 3)  oxyCODONE    IR 5 milliGRAM(s) Oral every 6 hours PRN Moderate Pain (4 - 6)      Allergies    No Known Allergies    Intolerances      Review of Systems:    ALL OTHER SYSTEMS REVIEWED AND NEGATIVE      PHYSICAL EXAM:  VITALS: T(C): 36.6 (05-05-20 @ 13:15)  T(F): 97.8 (05-05-20 @ 13:15), Max: 98.2 (05-04-20 @ 21:55)  HR: 82 (05-05-20 @ 13:15) (82 - 101)  BP: 126/64 (05-05-20 @ 13:15) (112/74 - 126/80)  RR:  (16 - 18)  SpO2:  (96% - 100%)  Wt(kg): --  GENERAL: NAD, well-groomed, well-developed  EYES: No proptosis, no lid lag, anicteric  HEENT:  Atraumatic, Normocephalic, moist mucous membranes  PSYCH: Alert and oriented x 3, normal affect, normal mood    CAPILLARY BLOOD GLUCOSE      POCT Blood Glucose.: 260 mg/dL (05 May 2020 11:33)  POCT Blood Glucose.: 244 mg/dL (05 May 2020 07:51)  POCT Blood Glucose.: 259 mg/dL (04 May 2020 22:17)  POCT Blood Glucose.: 234 mg/dL (04 May 2020 17:59)      05-05    132<L>  |  96<L>  |  12  ----------------------------<  267<H>  4.5   |  21<L>  |  0.41<L>    EGFR if : 138  EGFR if non : 119    Ca    8.8      05-05  Mg     2.0     05-05  Phos  2.1     05-05    TPro  7.1  /  Alb  2.9<L>  /  TBili  0.4  /  DBili  x   /  AST  9   /  ALT  12  /  AlkPhos  108  05-05      A1C with Estimated Average Glucose: 12.9 % (05-03-20 @ 18:04)      Thyroid Function Tests:

## 2020-05-05 NOTE — PHYSICAL THERAPY INITIAL EVALUATION ADULT - GENERAL OBSERVATIONS, REHAB EVAL
Patient received seated out of bed in a chair , (+) IV , (+) bulb drain , c/o pain in the abdomen after ambulation, RN Ebonie made aware.

## 2020-05-05 NOTE — PROGRESS NOTE ADULT - SUBJECTIVE AND OBJECTIVE BOX
Interval Events:  - S/p IR drain yesterday  - Endo recs: inc lantus to 22u qhs, increase ISS    S: Patient doing well, denies fevers, chills, nausea, emesis, chest pain, SOB. Significant improvement of abd pain.    O: Vital Signs  T(C): 36.8 (05-04 @ 21:55), Max: 37.3 (05-04 @ 09:20)  HR: 93 (05-04 @ 21:55) (88 - 105)  BP: 126/80 (05-04 @ 21:55) (111/70 - 139/95)  RR: 18 (05-04 @ 21:55) (17 - 18)  SpO2: 99% (05-04 @ 21:55) (96% - 99%)  05-03-20 @ 07:01  -  05-04-20 @ 07:00  --------------------------------------------------------  IN: 0 mL / OUT: 400 mL / NET: -400 mL    05-04-20 @ 07:01  -  05-05-20 @ 00:30  --------------------------------------------------------  IN: 0 mL / OUT: 7.5 mL / NET: -7.5 mL        Physical Exam:  Gen: NAD, A&Ox3  Pulm: No respiratory distress, no subcostal retractions  CV: RRR, no JVD  Abd: Soft, NT, ND, LLQ drain to bulb suction, minimal serous output                        12.3   18.94 )-----------( 605      ( 04 May 2020 06:35 )             39.1   05-04    134<L>  |  98  |  14  ----------------------------<  265<H>  4.0   |  21<L>  |  0.49<L>    Ca    9.3      04 May 2020 06:35  Phos  2.9     05-04  Mg     2.1     05-04    TPro  8.1  /  Alb  3.2<L>  /  TBili  0.6  /  DBili  x   /  AST  11  /  ALT  16  /  AlkPhos  119  05-04

## 2020-05-05 NOTE — DIETITIAN INITIAL EVALUATION ADULT. - PERTINENT MEDS FT
MEDICATIONS  (STANDING):  dextrose 5%. 1000 milliLiter(s) (50 mL/Hr) IV Continuous <Continuous>  dextrose 50% Injectable 12.5 Gram(s) IV Push once  dextrose 50% Injectable 25 Gram(s) IV Push once  dextrose 50% Injectable 25 Gram(s) IV Push once  enoxaparin Injectable 40 milliGRAM(s) SubCutaneous daily  insulin glargine Injectable (LANTUS) 22 Unit(s) SubCutaneous at bedtime  insulin lispro (HumaLOG) corrective regimen sliding scale   SubCutaneous three times a day with meals  piperacillin/tazobactam IVPB.. 3.375 Gram(s) IV Intermittent every 8 hours  sodium chloride 0.9%. 1000 milliLiter(s) (100 mL/Hr) IV Continuous <Continuous>  sodium phosphate IVPB 30 milliMole(s) IV Intermittent once    MEDICATIONS  (PRN):  dextrose 40% Gel 15 Gram(s) Oral once PRN Blood Glucose LESS THAN 70 milliGRAM(s)/deciliter  glucagon  Injectable 1 milliGRAM(s) IntraMuscular once PRN Glucose LESS THAN 70 milligrams/deciliter  HYDROmorphone  Injectable 0.5 milliGRAM(s) IV Push every 4 hours PRN Moderate Pain (4 - 6)

## 2020-05-05 NOTE — PHYSICAL THERAPY INITIAL EVALUATION ADULT - PERTINENT HX OF CURRENT PROBLEM, REHAB EVAL
This is a 54y F found to have large pericolonic abscess, with concern for underlying bowel perforation secondary to complicated diverticulitis vs. underlying colon mass,s/p IR drain on 5/4/20.

## 2020-05-05 NOTE — PROGRESS NOTE ADULT - ASSESSMENT
54 year old woman with newly diagnosed type 2 diabetes who presented with 3 days of acute abdominal pain located in LUQ with associated nausea and vomiting, found to have large pericolonic abscess, with concern for underlying bowel perforation secondary to complicated diverticulitis vs. underlying colon mass.     Impression:  #Pericolonic fluid collection: Concern for abscess or contained colonic perforation given leukocytosis. Etiology unclear but differential includes complicated diverticulitis vs. underlying colon mass. Age >50 years only risk factor for colon malignancy at this time. Abscess drainage and infectious work up pending.   #Type 2 diabetes: On basal bolus insulin.     Recommendations:  - continue to monitor drain output  - no immediate plan for colonoscopy at this time given possible colonic perforation or risk of subsequent perforation from endoscopic instrumentation - would repeat CT abdominal imaging after 3-4 days after drainage/drain placement    - no evidence of colitis on CT imaging so stool studies low yield at this time

## 2020-05-05 NOTE — PROGRESS NOTE ADULT - SUBJECTIVE AND OBJECTIVE BOX
Chief Complaint:  Patient is a 54y old  Female who presents with a chief complaint of Abdominal pain (05 May 2020 00:30)      Interval Events: Pt S/P IR drain yesterday. Afebrile.   ROS: All 12 point system except listed above were otherwise negative.    Allergies:  No Known Allergies        Hospital Medications:  dextrose 40% Gel 15 Gram(s) Oral once PRN  dextrose 5%. 1000 milliLiter(s) IV Continuous <Continuous>  dextrose 50% Injectable 12.5 Gram(s) IV Push once  dextrose 50% Injectable 25 Gram(s) IV Push once  dextrose 50% Injectable 25 Gram(s) IV Push once  enoxaparin Injectable 40 milliGRAM(s) SubCutaneous daily  glucagon  Injectable 1 milliGRAM(s) IntraMuscular once PRN  HYDROmorphone  Injectable 0.5 milliGRAM(s) IV Push every 4 hours PRN  insulin glargine Injectable (LANTUS) 22 Unit(s) SubCutaneous at bedtime  insulin lispro (HumaLOG) corrective regimen sliding scale   SubCutaneous three times a day with meals  piperacillin/tazobactam IVPB.. 3.375 Gram(s) IV Intermittent every 8 hours  sodium chloride 0.9%. 1000 milliLiter(s) IV Continuous <Continuous>  sodium phosphate IVPB 30 milliMole(s) IV Intermittent once      PMHX/PSHX:  Uncontrolled type 2 diabetes mellitus with complication  No pertinent past medical history  No significant past surgical history      Family history:    There is no family history of peptic ulcer disease, gastric cancer, colon polyps, colon cancer, celiac disease, biliary, hepatic, or pancreatic disease.  None of the female relatives have breast, uterine, or ovarian cancer.     PHYSICAL EXAM:   Vital Signs:  Vital Signs Last 24 Hrs  T(C): 36.3 (05 May 2020 06:00), Max: 37.3 (04 May 2020 09:20)  T(F): 97.3 (05 May 2020 06:00), Max: 99.1 (04 May 2020 09:20)  HR: 84 (05 May 2020 06:00) (84 - 105)  BP: 118/73 (05 May 2020 06:00) (111/70 - 126/80)  BP(mean): --  RR: 16 (05 May 2020 06:00) (16 - 18)  SpO2: 99% (05 May 2020 06:00) (96% - 99%)  Daily     Daily     GENERAL:  Appears stated age  HEENT:  NC/AT,  conjunctivae clear and pink, sclera -anicteric  CHEST:  Full & symmetric excursion, no increased effort, breath sounds clear  HEART:  Regular rhythm, S1, S2  ABDOMEN:  Soft, non-tender, non-distended, normoactive bowel sounds, LLQ drain to bulb suction, minimal serous output  EXTREMITIES:  no cyanosis,clubbing or edema  SKIN:  No rash/erythema/ecchymoses  NEURO:  Alert, oriented, no asterixis  PSYCH: Alert and oriented    LABS:                        11.2   21.76 )-----------( 581      ( 05 May 2020 06:19 )             35.5     Mean Cell Volume: 80.9 fL (05-05-20 @ 06:19)    05-05    132<L>  |  96<L>  |  12  ----------------------------<  267<H>  4.5   |  21<L>  |  0.41<L>    Ca    8.8      05 May 2020 06:19  Phos  2.1     05-05  Mg     2.0     05-05    TPro  7.1  /  Alb  2.9<L>  /  TBili  0.4  /  DBili  x   /  AST  9   /  ALT  12  /  AlkPhos  108  05-05    LIVER FUNCTIONS - ( 05 May 2020 06:19 )  Alb: 2.9 g/dL / Pro: 7.1 g/dL / ALK PHOS: 108 u/L / ALT: 12 u/L / AST: 9 u/L / GGT: x           PT/INR - ( 03 May 2020 19:50 )   PT: 16.8 SEC;   INR: 1.44          PTT - ( 03 May 2020 19:50 )  PTT:35.6 SEC  Urinalysis Basic - ( 03 May 2020 15:02 )    Color: YELLOW / Appearance: CLEAR / SG: > 1.040 / pH: 6.0  Gluc: >1000 / Ketone: MODERATE  / Bili: NEGATIVE / Urobili: NORMAL   Blood: NEGATIVE / Protein: 20 / Nitrite: NEGATIVE   Leuk Esterase: NEGATIVE / RBC: 0-2 / WBC 0-2   Sq Epi: OCC / Non Sq Epi: x / Bacteria: FEW                              11.2   21.76 )-----------( 581      ( 05 May 2020 06:19 )             35.5                         12.3   18.94 )-----------( 605      ( 04 May 2020 06:35 )             39.1                         12.4   15.23 )-----------( 523      ( 03 May 2020 19:50 )             36.1                         12.6   16.20 )-----------( 545      ( 03 May 2020 13:00 )             40.1     Imaging:      < from: CT Abdomen and Pelvis w/ IV Cont (05.03.20 @ 14:25) >  FINDINGS:    LOWER CHEST: Within normal limits.    LIVER: Within normal limits.  BILE DUCTS: Normal caliber.  GALLBLADDER: Within normal limits.  SPLEEN: Within normal limits.  PANCREAS: Within normal limits.  ADRENALS: Within normal limits.  KIDNEYS/URETERS: Within normal limits.    BLADDER: Within normal limits.  REPRODUCTIVE ORGANS: Fibroid uterus.    BOWEL:     4.5 x 4.3 x 2.5 cm fluid and gas containing collection with rim abutting the anterior aspect of the descending colon and the left upper quadrant (2:49). There is surrounding inflammatory change. Findings are suggestive of complicated diverticulitis with abscess formation. However no discrete colonic diverticula are present. Malignancy is not excluded from the differential.    There is a 2.2 cm well-circumscribed ovoid fluid attenuation structure directly abutting the proximal jejunum (602:51). Thismay represent tracking fluid or an enteric duplication cyst.     No bowel obstruction. Appendix is normal.  PERITONEUM: Trace ascites. No free intraperitoneal air.  VESSELS: Within normal limits.  RETROPERITONEUM/LYMPH NODES: No lymphadenopathy.    ABDOMINAL WALL: Within normal limits.  BONES: Within normal limits.    IMPRESSION:     4.5 x 4.3 x 2.5 cm fluid and gas containing collection with rim abutting the anterior aspect of the descending colon and the left upper quadrant (2:49). There is surrounding inflammatory change. Findings are suggestive of complicated diverticulitis with abscess formation. However no discrete colonic diverticula are present. Malignancy is not excluded from the differential.    No free intraperitoneal air.              < end of copied text > Chief Complaint:  Patient is a 54y old  Female who presents with a chief complaint of Abdominal pain (05 May 2020 00:30)      Interval Events: Pt S/P IR drain yesterday. Afebrile. Pt feels much better, denies fever, chills. No BMs.   ROS: All 12 point system except listed above were otherwise negative.    Allergies:  No Known Allergies        Hospital Medications:  dextrose 40% Gel 15 Gram(s) Oral once PRN  dextrose 5%. 1000 milliLiter(s) IV Continuous <Continuous>  dextrose 50% Injectable 12.5 Gram(s) IV Push once  dextrose 50% Injectable 25 Gram(s) IV Push once  dextrose 50% Injectable 25 Gram(s) IV Push once  enoxaparin Injectable 40 milliGRAM(s) SubCutaneous daily  glucagon  Injectable 1 milliGRAM(s) IntraMuscular once PRN  HYDROmorphone  Injectable 0.5 milliGRAM(s) IV Push every 4 hours PRN  insulin glargine Injectable (LANTUS) 22 Unit(s) SubCutaneous at bedtime  insulin lispro (HumaLOG) corrective regimen sliding scale   SubCutaneous three times a day with meals  piperacillin/tazobactam IVPB.. 3.375 Gram(s) IV Intermittent every 8 hours  sodium chloride 0.9%. 1000 milliLiter(s) IV Continuous <Continuous>  sodium phosphate IVPB 30 milliMole(s) IV Intermittent once      PMHX/PSHX:  Uncontrolled type 2 diabetes mellitus with complication  No pertinent past medical history  No significant past surgical history      Family history:    There is no family history of peptic ulcer disease, gastric cancer, colon polyps, colon cancer, celiac disease, biliary, hepatic, or pancreatic disease.  None of the female relatives have breast, uterine, or ovarian cancer.     PHYSICAL EXAM:   Vital Signs:  Vital Signs Last 24 Hrs  T(C): 36.3 (05 May 2020 06:00), Max: 37.3 (04 May 2020 09:20)  T(F): 97.3 (05 May 2020 06:00), Max: 99.1 (04 May 2020 09:20)  HR: 84 (05 May 2020 06:00) (84 - 105)  BP: 118/73 (05 May 2020 06:00) (111/70 - 126/80)  BP(mean): --  RR: 16 (05 May 2020 06:00) (16 - 18)  SpO2: 99% (05 May 2020 06:00) (96% - 99%)  Daily     Daily     GENERAL:  Appears stated age  HEENT:  NC/AT,  conjunctivae clear and pink, sclera -anicteric  CHEST:  Full & symmetric excursion, no increased effort, breath sounds clear  HEART:  Regular rhythm, S1, S2  ABDOMEN:  Soft, non-tender, non-distended, normoactive bowel sounds, LLQ drain to bulb suction, minimal serous output  EXTREMITIES:  no cyanosis,clubbing or edema  SKIN:  No rash/erythema/ecchymoses  NEURO:  Alert, oriented, no asterixis  PSYCH: Alert and oriented    LABS:                        11.2   21.76 )-----------( 581      ( 05 May 2020 06:19 )             35.5     Mean Cell Volume: 80.9 fL (05-05-20 @ 06:19)    05-05    132<L>  |  96<L>  |  12  ----------------------------<  267<H>  4.5   |  21<L>  |  0.41<L>    Ca    8.8      05 May 2020 06:19  Phos  2.1     05-05  Mg     2.0     05-05    TPro  7.1  /  Alb  2.9<L>  /  TBili  0.4  /  DBili  x   /  AST  9   /  ALT  12  /  AlkPhos  108  05-05    LIVER FUNCTIONS - ( 05 May 2020 06:19 )  Alb: 2.9 g/dL / Pro: 7.1 g/dL / ALK PHOS: 108 u/L / ALT: 12 u/L / AST: 9 u/L / GGT: x           PT/INR - ( 03 May 2020 19:50 )   PT: 16.8 SEC;   INR: 1.44          PTT - ( 03 May 2020 19:50 )  PTT:35.6 SEC  Urinalysis Basic - ( 03 May 2020 15:02 )    Color: YELLOW / Appearance: CLEAR / SG: > 1.040 / pH: 6.0  Gluc: >1000 / Ketone: MODERATE  / Bili: NEGATIVE / Urobili: NORMAL   Blood: NEGATIVE / Protein: 20 / Nitrite: NEGATIVE   Leuk Esterase: NEGATIVE / RBC: 0-2 / WBC 0-2   Sq Epi: OCC / Non Sq Epi: x / Bacteria: FEW                              11.2   21.76 )-----------( 581      ( 05 May 2020 06:19 )             35.5                         12.3   18.94 )-----------( 605      ( 04 May 2020 06:35 )             39.1                         12.4   15.23 )-----------( 523      ( 03 May 2020 19:50 )             36.1                         12.6   16.20 )-----------( 545      ( 03 May 2020 13:00 )             40.1     Imaging:      < from: CT Abdomen and Pelvis w/ IV Cont (05.03.20 @ 14:25) >  FINDINGS:    LOWER CHEST: Within normal limits.    LIVER: Within normal limits.  BILE DUCTS: Normal caliber.  GALLBLADDER: Within normal limits.  SPLEEN: Within normal limits.  PANCREAS: Within normal limits.  ADRENALS: Within normal limits.  KIDNEYS/URETERS: Within normal limits.    BLADDER: Within normal limits.  REPRODUCTIVE ORGANS: Fibroid uterus.    BOWEL:     4.5 x 4.3 x 2.5 cm fluid and gas containing collection with rim abutting the anterior aspect of the descending colon and the left upper quadrant (2:49). There is surrounding inflammatory change. Findings are suggestive of complicated diverticulitis with abscess formation. However no discrete colonic diverticula are present. Malignancy is not excluded from the differential.    There is a 2.2 cm well-circumscribed ovoid fluid attenuation structure directly abutting the proximal jejunum (602:51). Thismay represent tracking fluid or an enteric duplication cyst.     No bowel obstruction. Appendix is normal.  PERITONEUM: Trace ascites. No free intraperitoneal air.  VESSELS: Within normal limits.  RETROPERITONEUM/LYMPH NODES: No lymphadenopathy.    ABDOMINAL WALL: Within normal limits.  BONES: Within normal limits.    IMPRESSION:     4.5 x 4.3 x 2.5 cm fluid and gas containing collection with rim abutting the anterior aspect of the descending colon and the left upper quadrant (2:49). There is surrounding inflammatory change. Findings are suggestive of complicated diverticulitis with abscess formation. However no discrete colonic diverticula are present. Malignancy is not excluded from the differential.    No free intraperitoneal air.              < end of copied text >

## 2020-05-05 NOTE — DIETITIAN INITIAL EVALUATION ADULT. - PERTINENT LABORATORY DATA
05-05 Na132 mmol/L<L> Glu 267 mg/dL<H> K+ 4.5 mmol/L Cr  0.41 mg/dL<L> BUN 12 mg/dL 05-05 Phos 2.1 mg/dL<L> 05-05 Alb 2.9 g/dL<L>

## 2020-05-05 NOTE — PROGRESS NOTE ADULT - ASSESSMENT
54F with perforated descending colon, possible malignant in origin vs diverticulitis although no evidence of diverticular disease. Currently hemodynamically stable with hyperglycemia (borderlines DKA). Now s/p LLQ IR drain. CEA 1.0 WNL.    Plan  - DVT ppx  - f/u IR culture  - monitor IR drain output  - GI consultation for colonoscopy - patient never had colonoscopy before - perf possibly secondary to cancer   - F/u endo rec's  - analgesia and antiemetics as needed

## 2020-05-05 NOTE — DIETITIAN INITIAL EVALUATION ADULT. - OTHER INFO
Patient is a 55 y/o female with PMH newly diagnosed DMII presented with 3 days of acute abdominal pain located in LUQ with associated nausea and vomiting, found to have large pericolonic abscess, with concern for underlying bowel perforation secondary to complicated diverticulitis vs. underlying colon mass. s/p IR-guided LLQ drainage 5/4.  Per H&P, pt has abd pain and N/V prior to admission. Patient currently reports improvement on abd pain and no more N/V, tolerated Clear liquid at this time. No chewing/swallowing difficulties on current diet. Noted HgbA1C 12.9% on 5/3 -- poor long term glycemic control. Patient reports that her diet mainly consist of vegetables, but occasionally has rice and juice. Patient also reports she has a glucometer at home but does not usually check her FSBG daily. Currently covered with lantus and ISS, may benefit from Consistent Carbohydrate diet as targeted diet. Endocrinologist following. Patient denies recent weight loss prior to admission.

## 2020-05-06 LAB
-  AMIKACIN: SIGNIFICANT CHANGE UP
-  AMOXICILLIN/CLAVULANIC ACID: SIGNIFICANT CHANGE UP
-  AMPICILLIN/SULBACTAM: SIGNIFICANT CHANGE UP
-  AMPICILLIN: SIGNIFICANT CHANGE UP
-  AMPICILLIN: SIGNIFICANT CHANGE UP
-  AZTREONAM: SIGNIFICANT CHANGE UP
-  CEFAZOLIN: SIGNIFICANT CHANGE UP
-  CEFEPIME: SIGNIFICANT CHANGE UP
-  CEFOXITIN: SIGNIFICANT CHANGE UP
-  CEFTRIAXONE: SIGNIFICANT CHANGE UP
-  CIPROFLOXACIN: SIGNIFICANT CHANGE UP
-  ERTAPENEM: SIGNIFICANT CHANGE UP
-  GENTAMICIN: SIGNIFICANT CHANGE UP
-  IMIPENEM: SIGNIFICANT CHANGE UP
-  LEVOFLOXACIN: SIGNIFICANT CHANGE UP
-  MEROPENEM: SIGNIFICANT CHANGE UP
-  PIPERACILLIN/TAZOBACTAM: SIGNIFICANT CHANGE UP
-  TETRACYCLINE: SIGNIFICANT CHANGE UP
-  TOBRAMYCIN: SIGNIFICANT CHANGE UP
-  TRIMETHOPRIM/SULFAMETHOXAZOLE: SIGNIFICANT CHANGE UP
-  VANCOMYCIN: SIGNIFICANT CHANGE UP
ANION GAP SERPL CALC-SCNC: 11 MMO/L — SIGNIFICANT CHANGE UP (ref 7–14)
BUN SERPL-MCNC: 8 MG/DL — SIGNIFICANT CHANGE UP (ref 7–23)
CALCIUM SERPL-MCNC: 8 MG/DL — LOW (ref 8.4–10.5)
CHLORIDE SERPL-SCNC: 102 MMOL/L — SIGNIFICANT CHANGE UP (ref 98–107)
CO2 SERPL-SCNC: 21 MMOL/L — LOW (ref 22–31)
CREAT SERPL-MCNC: 0.38 MG/DL — LOW (ref 0.5–1.3)
GLUCOSE BLDC GLUCOMTR-MCNC: 102 MG/DL — HIGH (ref 70–99)
GLUCOSE BLDC GLUCOMTR-MCNC: 153 MG/DL — HIGH (ref 70–99)
GLUCOSE BLDC GLUCOMTR-MCNC: 212 MG/DL — HIGH (ref 70–99)
GLUCOSE BLDC GLUCOMTR-MCNC: 239 MG/DL — HIGH (ref 70–99)
GLUCOSE BLDC GLUCOMTR-MCNC: 91 MG/DL — SIGNIFICANT CHANGE UP (ref 70–99)
GLUCOSE SERPL-MCNC: 283 MG/DL — HIGH (ref 70–99)
HCT VFR BLD CALC: 33.2 % — LOW (ref 34.5–45)
HGB BLD-MCNC: 10.4 G/DL — LOW (ref 11.5–15.5)
MAGNESIUM SERPL-MCNC: 2.1 MG/DL — SIGNIFICANT CHANGE UP (ref 1.6–2.6)
MCHC RBC-ENTMCNC: 24.9 PG — LOW (ref 27–34)
MCHC RBC-ENTMCNC: 31.3 % — LOW (ref 32–36)
MCV RBC AUTO: 79.6 FL — LOW (ref 80–100)
METHOD TYPE: SIGNIFICANT CHANGE UP
METHOD TYPE: SIGNIFICANT CHANGE UP
NRBC # FLD: 0 K/UL — SIGNIFICANT CHANGE UP (ref 0–0)
PHOSPHATE SERPL-MCNC: 1.4 MG/DL — LOW (ref 2.5–4.5)
PLATELET # BLD AUTO: 568 K/UL — HIGH (ref 150–400)
PMV BLD: 10 FL — SIGNIFICANT CHANGE UP (ref 7–13)
POTASSIUM SERPL-MCNC: 3.6 MMOL/L — SIGNIFICANT CHANGE UP (ref 3.5–5.3)
POTASSIUM SERPL-SCNC: 3.6 MMOL/L — SIGNIFICANT CHANGE UP (ref 3.5–5.3)
RBC # BLD: 4.17 M/UL — SIGNIFICANT CHANGE UP (ref 3.8–5.2)
RBC # FLD: 15.2 % — HIGH (ref 10.3–14.5)
SODIUM SERPL-SCNC: 134 MMOL/L — LOW (ref 135–145)
WBC # BLD: 16.91 K/UL — HIGH (ref 3.8–10.5)
WBC # FLD AUTO: 16.91 K/UL — HIGH (ref 3.8–10.5)

## 2020-05-06 PROCEDURE — 99232 SBSQ HOSP IP/OBS MODERATE 35: CPT

## 2020-05-06 PROCEDURE — 99231 SBSQ HOSP IP/OBS SF/LOW 25: CPT

## 2020-05-06 RX ORDER — HYDROMORPHONE HYDROCHLORIDE 2 MG/ML
2 INJECTION INTRAMUSCULAR; INTRAVENOUS; SUBCUTANEOUS ONCE
Refills: 0 | Status: DISCONTINUED | OUTPATIENT
Start: 2020-05-06 | End: 2020-05-06

## 2020-05-06 RX ORDER — LIDOCAINE 4 G/100G
1 CREAM TOPICAL DAILY
Refills: 0 | Status: DISCONTINUED | OUTPATIENT
Start: 2020-05-06 | End: 2020-05-08

## 2020-05-06 RX ORDER — INSULIN LISPRO 100/ML
VIAL (ML) SUBCUTANEOUS
Refills: 0 | Status: DISCONTINUED | OUTPATIENT
Start: 2020-05-06 | End: 2020-05-08

## 2020-05-06 RX ORDER — INSULIN LISPRO 100/ML
VIAL (ML) SUBCUTANEOUS EVERY 6 HOURS
Refills: 0 | Status: DISCONTINUED | OUTPATIENT
Start: 2020-05-06 | End: 2020-05-06

## 2020-05-06 RX ORDER — IBUPROFEN 200 MG
600 TABLET ORAL EVERY 6 HOURS
Refills: 0 | Status: DISCONTINUED | OUTPATIENT
Start: 2020-05-06 | End: 2020-05-08

## 2020-05-06 RX ORDER — POTASSIUM PHOSPHATE, MONOBASIC POTASSIUM PHOSPHATE, DIBASIC 236; 224 MG/ML; MG/ML
30 INJECTION, SOLUTION INTRAVENOUS ONCE
Refills: 0 | Status: COMPLETED | OUTPATIENT
Start: 2020-05-06 | End: 2020-05-06

## 2020-05-06 RX ORDER — INSULIN LISPRO 100/ML
VIAL (ML) SUBCUTANEOUS
Refills: 0 | Status: DISCONTINUED | OUTPATIENT
Start: 2020-05-06 | End: 2020-05-06

## 2020-05-06 RX ORDER — INSULIN LISPRO 100/ML
VIAL (ML) SUBCUTANEOUS AT BEDTIME
Refills: 0 | Status: DISCONTINUED | OUTPATIENT
Start: 2020-05-06 | End: 2020-05-08

## 2020-05-06 RX ADMIN — PIPERACILLIN AND TAZOBACTAM 25 GRAM(S): 4; .5 INJECTION, POWDER, LYOPHILIZED, FOR SOLUTION INTRAVENOUS at 16:55

## 2020-05-06 RX ADMIN — Medication 4: at 08:38

## 2020-05-06 RX ADMIN — POTASSIUM PHOSPHATE, MONOBASIC POTASSIUM PHOSPHATE, DIBASIC 83.33 MILLIMOLE(S): 236; 224 INJECTION, SOLUTION INTRAVENOUS at 12:11

## 2020-05-06 RX ADMIN — DEXTROSE MONOHYDRATE, SODIUM CHLORIDE, AND POTASSIUM CHLORIDE 90 MILLILITER(S): 50; .745; 4.5 INJECTION, SOLUTION INTRAVENOUS at 10:17

## 2020-05-06 RX ADMIN — Medication 650 MILLIGRAM(S): at 16:54

## 2020-05-06 RX ADMIN — DEXTROSE MONOHYDRATE, SODIUM CHLORIDE, AND POTASSIUM CHLORIDE 90 MILLILITER(S): 50; .745; 4.5 INJECTION, SOLUTION INTRAVENOUS at 19:29

## 2020-05-06 RX ADMIN — LIDOCAINE 1 PATCH: 4 CREAM TOPICAL at 21:30

## 2020-05-06 RX ADMIN — ENOXAPARIN SODIUM 40 MILLIGRAM(S): 100 INJECTION SUBCUTANEOUS at 12:11

## 2020-05-06 RX ADMIN — HYDROMORPHONE HYDROCHLORIDE 2 MILLIGRAM(S): 2 INJECTION INTRAMUSCULAR; INTRAVENOUS; SUBCUTANEOUS at 18:46

## 2020-05-06 RX ADMIN — PIPERACILLIN AND TAZOBACTAM 25 GRAM(S): 4; .5 INJECTION, POWDER, LYOPHILIZED, FOR SOLUTION INTRAVENOUS at 01:04

## 2020-05-06 RX ADMIN — HYDROMORPHONE HYDROCHLORIDE 0.5 MILLIGRAM(S): 2 INJECTION INTRAMUSCULAR; INTRAVENOUS; SUBCUTANEOUS at 03:29

## 2020-05-06 RX ADMIN — Medication 650 MILLIGRAM(S): at 12:11

## 2020-05-06 RX ADMIN — Medication 2: at 12:11

## 2020-05-06 RX ADMIN — PIPERACILLIN AND TAZOBACTAM 25 GRAM(S): 4; .5 INJECTION, POWDER, LYOPHILIZED, FOR SOLUTION INTRAVENOUS at 10:15

## 2020-05-06 RX ADMIN — Medication 650 MILLIGRAM(S): at 06:26

## 2020-05-06 NOTE — ADVANCED PRACTICE NURSE CONSULT - ASSESSMENT
Patient reports a hx of Type 2 DM for past few months. Says does not know the name of medication she takes- a small blue pill, takes BID. Thinks may be 1000mg dosing (metformin?). Never been on insulin. Patient has been in denial over her diabetes and a lot of the session was spent discussing what diabetes is, how did this happen, and why her blood sugars are elevated without her eating. Pt does not have a glucometer at home. Patient instructed on the importance of blood glucose monitoring. Patient open to checking her blood sugar 4 times a day and did not express any anxiety over needles or injections when being taught. Patient also instructed on A1c and blood sugar target and goal. Diet reviewed. Pt’s diet is mainly of vegetables, no protein, she has been staying away from dairy, and small amount of rice if any. Patient also reports skipping either breakfast or lunch and if she eats breakfast will have smoothie made with celery, cucumbers, 1/3 of banana, and some other fruit (strawberry or nilay). Patient gets confused about diet. Her sister is a Vegan and gives her advice that she often finds confusing. Dietitian saw pt yesterday but pt does not remember due to pain level yesterday. RN put in another referral. SCOTES reached out to RD also to explain the additional consult. Patient also instructed on s/s and proper treatment of hypoglycemia, how to use insulin pen, location of injection sites and importance of rotating sites, storage of insulin, and needle disposal. Educational material left with pt to reinforce education provided. Also pt encouraged to watch the educational videos on the TV. Patient demonstrated understanding of information taught and proper use of insulin pen. Case discussed with Primary RN and Endocrine team.

## 2020-05-06 NOTE — ADVANCED PRACTICE NURSE CONSULT - RECOMMEDATIONS
RN to reinforce diabetes education, insulin pen teaching and Instruction on BGM. Endocrine team following pt. Discharge home unknown.

## 2020-05-06 NOTE — PROGRESS NOTE ADULT - SUBJECTIVE AND OBJECTIVE BOX
Chief Complaint: DM2    History: Clear liquid diet. Tolerating ok but still with some abdominal pain.  No hypoglycemia.    MEDICATIONS  (STANDING):  acetaminophen   Tablet .. 650 milliGRAM(s) Oral every 6 hours  dextrose 5% + sodium chloride 0.9% with potassium chloride 20 mEq/L 1000 milliLiter(s) (90 mL/Hr) IV Continuous <Continuous>  dextrose 5%. 1000 milliLiter(s) (50 mL/Hr) IV Continuous <Continuous>  dextrose 50% Injectable 12.5 Gram(s) IV Push once  dextrose 50% Injectable 25 Gram(s) IV Push once  dextrose 50% Injectable 25 Gram(s) IV Push once  enoxaparin Injectable 40 milliGRAM(s) SubCutaneous daily  ibuprofen  Tablet. 600 milliGRAM(s) Oral every 6 hours  insulin glargine Injectable (LANTUS) 26 Unit(s) SubCutaneous at bedtime  insulin lispro (HumaLOG) corrective regimen sliding scale   SubCutaneous three times a day with meals  lidocaine   Patch 1 Patch Transdermal daily  piperacillin/tazobactam IVPB.. 3.375 Gram(s) IV Intermittent every 8 hours    MEDICATIONS  (PRN):  dextrose 40% Gel 15 Gram(s) Oral once PRN Blood Glucose LESS THAN 70 milliGRAM(s)/deciliter  glucagon  Injectable 1 milliGRAM(s) IntraMuscular once PRN Glucose LESS THAN 70 milligrams/deciliter  HYDROmorphone   Tablet 2 milliGRAM(s) Oral once PRN Severe Pain (7 - 10)  oxyCODONE    IR 5 milliGRAM(s) Oral every 6 hours PRN Moderate Pain (4 - 6)      Allergies    No Known Allergies    Intolerances      Review of Systems:    ALL OTHER SYSTEMS REVIEWED AND NEGATIVE      PHYSICAL EXAM:  VITALS: T(C): 36.5 (05-06-20 @ 14:45)  T(F): 97.7 (05-06-20 @ 14:45), Max: 98.6 (05-05-20 @ 21:36)  HR: 86 (05-06-20 @ 14:45) (72 - 90)  BP: 127/84 (05-06-20 @ 14:45) (115/72 - 130/76)  RR:  (17 - 18)  SpO2:  (98% - 100%)  Wt(kg): --  GENERAL: NAD, well-groomed, well-developed  EYES: No proptosis, no lid lag, anicteric  HEENT:  Atraumatic, Normocephalic, moist mucous membranes  PSYCH: Alert and oriented x 3, normal affect, normal mood    CAPILLARY BLOOD GLUCOSE      POCT Blood Glucose.: 91 mg/dL (06 May 2020 17:15)  POCT Blood Glucose.: 153 mg/dL (06 May 2020 12:02)  POCT Blood Glucose.: 212 mg/dL (06 May 2020 08:27)  POCT Blood Glucose.: 239 mg/dL (06 May 2020 06:14)  POCT Blood Glucose.: 284 mg/dL (05 May 2020 21:34)  POCT Blood Glucose.: 276 mg/dL (05 May 2020 18:05)      05-06    134<L>  |  102  |  8   ----------------------------<  283<H>  3.6   |  21<L>  |  0.38<L>    EGFR if : 141  EGFR if non : 122    Ca    8.0<L>      05-06  Mg     2.1     05-06  Phos  1.4     05-06    TPro  7.1  /  Alb  2.9<L>  /  TBili  0.4  /  DBili  x   /  AST  9   /  ALT  12  /  AlkPhos  108  05-05      A1C with Estimated Average Glucose: 12.9 % (05-03-20 @ 18:04)      Thyroid Function Tests:

## 2020-05-06 NOTE — PROGRESS NOTE ADULT - SUBJECTIVE AND OBJECTIVE BOX
Interval Events:  - pt with abdominal pain and distension yesterday afternoon  - Abd x-ray showing gastric/intestinal distension with nonobstructive bowel gas pattern  - lantus increased to 26, ISS increased to moderate    S: Patient doing well, denies fevers, chills, nausea, emesis, chest pain, SOB. Significant improvement of abd pain.    O:   Vital Signs Last 24 Hrs  T(C): 37 (05 May 2020 21:36), Max: 37 (05 May 2020 21:36)  T(F): 98.6 (05 May 2020 21:36), Max: 98.6 (05 May 2020 21:36)  HR: 90 (05 May 2020 21:36) (72 - 90)  BP: 118/56 (05 May 2020 21:36) (115/73 - 129/62)  BP(mean): --  RR: 17 (05 May 2020 21:36) (16 - 17)  SpO2: 100% (05 May 2020 21:36) (99% - 100%)    I&O's Detail    04 May 2020 07:01  -  05 May 2020 07:00  --------------------------------------------------------  IN:  Total IN: 0 mL    OUT:    Bulb: 17.5 mL  Total OUT: 17.5 mL    Total NET: -17.5 mL      05 May 2020 07:01  -  06 May 2020 01:03  --------------------------------------------------------  IN:  Total IN: 0 mL    OUT:    Bulb: 5 mL    Voided: 300 mL  Total OUT: 305 mL    Total NET: -305 mL        Physical Exam:  Gen: NAD, A&Ox3  Pulm: No respiratory distress, no subcostal retractions  CV: RRR, no JVD  Abd: Soft, NT, ND, LLQ drain to bulb suction, minimal sero-purulent output                                      11.2   21.76 )-----------( 581      ( 05 May 2020 06:19 )             35.5     05-05    132<L>  |  96<L>  |  12  ----------------------------<  267<H>  4.5   |  21<L>  |  0.41<L>    Ca    8.8      05 May 2020 06:19  Phos  2.1     05-05  Mg     2.0     05-05    TPro  7.1  /  Alb  2.9<L>  /  TBili  0.4  /  DBili  x   /  AST  9   /  ALT  12  /  AlkPhos  108  05-05

## 2020-05-06 NOTE — PROGRESS NOTE ADULT - ASSESSMENT
54F F with uncontrolled Type 2 DM (A1C 12.9%), presents with 3 days of acute abdominal pain located in LUQ. Admitted with colonic perforation.   Endocrine team consulted for uncontrolled diabetes. Patient is high risk with high level decision making due to uncontrolled diabetes which places patient at high risk for cardiovascular and cerebrovascular events. Patient with lability of glucose requiring close monitoring and insulin adjustments.      Problem/Recommendation - 1:  Problem: Type 2 diabetes mellitus with hyperglycemia, without long-term current use of insulin. Recommendation:   -Patient with uncontrolled Type 2 Dm (A1C 12.9%)   -Currently on clear liquid diet. BG running lower. Some juice intake.   -Change correction scale to low premeal and low bedtime  -Continue Lantus 26 units qhs  -Patient also on D5NS  -DC plan likely basal/bolus regimen, doses to be determined. Patient will need prescription for glucometer, test strips, and lancets prior to dc as well.  -Patient will need education by bedside nurse on insulin pen, glucometer use prior to discharge.  -Patient can f/u with endocrinology at [address] [place] [address] [phone].    Problem/Recommendation - 2:  ·  Problem: Hypertension.  Recommendation: -BP at goal <130/80 off meds. Monitor BP.     Problem/Recommendation - 3:  ·  Problem: Hyperlipidemia.  Recommendation: -can check outpatient lipid profile.

## 2020-05-06 NOTE — ADVANCED PRACTICE NURSE CONSULT - REASON FOR CONSULT
54F presents with 3 days of acute abdominal pain located in LUQ. 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 This morning awoke with intense abdominal pain, N/V x 5 hours, came to [facility] ED. No HA, CP, SOB. +N/V, No D/C. No fevers at home (states sister measured temperature this morning). Has never had this pain before. Has never had a colonoscopy. Pt has not followed with eye doctor, no previous hx of retinopathy. Denies neuropathy.  Patient’s pain is much improves from yesterday.   Pt referred for diabetes education because A1c 12% and new to insulin

## 2020-05-06 NOTE — PROGRESS NOTE ADULT - ASSESSMENT
54F with perforated descending colon, possible malignant in origin vs diverticulitis although no evidence of diverticular disease. Currently hemodynamically stable with hyperglycemia (borderlines DKA). Now s/p LLQ IR drain. CEA 1.0 WNL.    Plan  - DVT ppx  - f/u IR culture  - monitor IR drain output  - appreciate GI recs- repeat CT 3-4 days after IR procedure  - Lantus increased to 26 per endocrine, ISS increased to moderate  - analgesia and antiemetics as needed 54F with perforated descending colon, possible malignant in origin vs diverticulitis although no evidence of diverticular disease. Currently hemodynamically stable with hyperglycemia (borderlines DKA). Now s/p LLQ IR drain. CEA 1.0 WNL.    Plan  - Advance diet to CLD  - DVT ppx  - f/u IR culture  - monitor IR drain output  - appreciate GI recs- repeat CT 3-4 days after IR procedure  - Lantus increased to 26 per endocrine, ISS increased to moderate  - analgesia and antiemetics as needed    B Team Surgery [id number]

## 2020-05-07 LAB
ANION GAP SERPL CALC-SCNC: 11 MMO/L — SIGNIFICANT CHANGE UP (ref 7–14)
ANION GAP SERPL CALC-SCNC: 11 MMO/L — SIGNIFICANT CHANGE UP (ref 7–14)
BUN SERPL-MCNC: 5 MG/DL — LOW (ref 7–23)
BUN SERPL-MCNC: 6 MG/DL — LOW (ref 7–23)
CALCIUM SERPL-MCNC: 7.6 MG/DL — LOW (ref 8.4–10.5)
CALCIUM SERPL-MCNC: 7.9 MG/DL — LOW (ref 8.4–10.5)
CHLORIDE SERPL-SCNC: 104 MMOL/L — SIGNIFICANT CHANGE UP (ref 98–107)
CHLORIDE SERPL-SCNC: 105 MMOL/L — SIGNIFICANT CHANGE UP (ref 98–107)
CO2 SERPL-SCNC: 21 MMOL/L — LOW (ref 22–31)
CO2 SERPL-SCNC: 23 MMOL/L — SIGNIFICANT CHANGE UP (ref 22–31)
CREAT SERPL-MCNC: 0.36 MG/DL — LOW (ref 0.5–1.3)
CREAT SERPL-MCNC: 0.47 MG/DL — LOW (ref 0.5–1.3)
GLUCOSE BLDC GLUCOMTR-MCNC: 147 MG/DL — HIGH (ref 70–99)
GLUCOSE BLDC GLUCOMTR-MCNC: 149 MG/DL — HIGH (ref 70–99)
GLUCOSE BLDC GLUCOMTR-MCNC: 191 MG/DL — HIGH (ref 70–99)
GLUCOSE BLDC GLUCOMTR-MCNC: 202 MG/DL — HIGH (ref 70–99)
GLUCOSE SERPL-MCNC: 162 MG/DL — HIGH (ref 70–99)
GLUCOSE SERPL-MCNC: 218 MG/DL — HIGH (ref 70–99)
HCT VFR BLD CALC: 33.2 % — LOW (ref 34.5–45)
HGB BLD-MCNC: 10.7 G/DL — LOW (ref 11.5–15.5)
MAGNESIUM SERPL-MCNC: 1.8 MG/DL — SIGNIFICANT CHANGE UP (ref 1.6–2.6)
MAGNESIUM SERPL-MCNC: 1.9 MG/DL — SIGNIFICANT CHANGE UP (ref 1.6–2.6)
MCHC RBC-ENTMCNC: 25.4 PG — LOW (ref 27–34)
MCHC RBC-ENTMCNC: 32.2 % — SIGNIFICANT CHANGE UP (ref 32–36)
MCV RBC AUTO: 78.9 FL — LOW (ref 80–100)
NRBC # FLD: 0.02 K/UL — SIGNIFICANT CHANGE UP (ref 0–0)
PHOSPHATE SERPL-MCNC: 2 MG/DL — LOW (ref 2.5–4.5)
PHOSPHATE SERPL-MCNC: 2.2 MG/DL — LOW (ref 2.5–4.5)
PLATELET # BLD AUTO: 583 K/UL — HIGH (ref 150–400)
PMV BLD: 9.6 FL — SIGNIFICANT CHANGE UP (ref 7–13)
POTASSIUM SERPL-MCNC: 3.5 MMOL/L — SIGNIFICANT CHANGE UP (ref 3.5–5.3)
POTASSIUM SERPL-MCNC: 3.7 MMOL/L — SIGNIFICANT CHANGE UP (ref 3.5–5.3)
POTASSIUM SERPL-SCNC: 3.5 MMOL/L — SIGNIFICANT CHANGE UP (ref 3.5–5.3)
POTASSIUM SERPL-SCNC: 3.7 MMOL/L — SIGNIFICANT CHANGE UP (ref 3.5–5.3)
RBC # BLD: 4.21 M/UL — SIGNIFICANT CHANGE UP (ref 3.8–5.2)
RBC # FLD: 15.5 % — HIGH (ref 10.3–14.5)
SODIUM SERPL-SCNC: 137 MMOL/L — SIGNIFICANT CHANGE UP (ref 135–145)
SODIUM SERPL-SCNC: 138 MMOL/L — SIGNIFICANT CHANGE UP (ref 135–145)
WBC # BLD: 11.74 K/UL — HIGH (ref 3.8–10.5)
WBC # FLD AUTO: 11.74 K/UL — HIGH (ref 3.8–10.5)

## 2020-05-07 PROCEDURE — 74177 CT ABD & PELVIS W/CONTRAST: CPT | Mod: 26

## 2020-05-07 PROCEDURE — 99232 SBSQ HOSP IP/OBS MODERATE 35: CPT

## 2020-05-07 PROCEDURE — 99231 SBSQ HOSP IP/OBS SF/LOW 25: CPT

## 2020-05-07 RX ORDER — MAGNESIUM SULFATE 500 MG/ML
2 VIAL (ML) INJECTION ONCE
Refills: 0 | Status: COMPLETED | OUTPATIENT
Start: 2020-05-07 | End: 2020-05-07

## 2020-05-07 RX ORDER — INSULIN GLARGINE 100 [IU]/ML
12 INJECTION, SOLUTION SUBCUTANEOUS AT BEDTIME
Refills: 0 | Status: DISCONTINUED | OUTPATIENT
Start: 2020-05-07 | End: 2020-05-08

## 2020-05-07 RX ORDER — POLYETHYLENE GLYCOL 3350 17 G/17G
17 POWDER, FOR SOLUTION ORAL DAILY
Refills: 0 | Status: DISCONTINUED | OUTPATIENT
Start: 2020-05-07 | End: 2020-05-07

## 2020-05-07 RX ORDER — SODIUM,POTASSIUM PHOSPHATES 278-250MG
2 POWDER IN PACKET (EA) ORAL
Refills: 0 | Status: COMPLETED | OUTPATIENT
Start: 2020-05-07 | End: 2020-05-08

## 2020-05-07 RX ORDER — SENNA PLUS 8.6 MG/1
2 TABLET ORAL AT BEDTIME
Refills: 0 | Status: DISCONTINUED | OUTPATIENT
Start: 2020-05-07 | End: 2020-05-07

## 2020-05-07 RX ORDER — SENNA PLUS 8.6 MG/1
2 TABLET ORAL AT BEDTIME
Refills: 0 | Status: DISCONTINUED | OUTPATIENT
Start: 2020-05-07 | End: 2020-05-08

## 2020-05-07 RX ORDER — POTASSIUM PHOSPHATE, MONOBASIC POTASSIUM PHOSPHATE, DIBASIC 236; 224 MG/ML; MG/ML
30 INJECTION, SOLUTION INTRAVENOUS ONCE
Refills: 0 | Status: COMPLETED | OUTPATIENT
Start: 2020-05-07 | End: 2020-05-07

## 2020-05-07 RX ORDER — POLYETHYLENE GLYCOL 3350 17 G/17G
17 POWDER, FOR SOLUTION ORAL DAILY
Refills: 0 | Status: DISCONTINUED | OUTPATIENT
Start: 2020-05-07 | End: 2020-05-08

## 2020-05-07 RX ADMIN — LIDOCAINE 1 PATCH: 4 CREAM TOPICAL at 12:48

## 2020-05-07 RX ADMIN — Medication 2 PACKET(S): at 10:59

## 2020-05-07 RX ADMIN — Medication 600 MILLIGRAM(S): at 23:30

## 2020-05-07 RX ADMIN — Medication 600 MILLIGRAM(S): at 05:31

## 2020-05-07 RX ADMIN — LIDOCAINE 1 PATCH: 4 CREAM TOPICAL at 23:29

## 2020-05-07 RX ADMIN — Medication 600 MILLIGRAM(S): at 18:47

## 2020-05-07 RX ADMIN — PIPERACILLIN AND TAZOBACTAM 25 GRAM(S): 4; .5 INJECTION, POWDER, LYOPHILIZED, FOR SOLUTION INTRAVENOUS at 10:11

## 2020-05-07 RX ADMIN — Medication 600 MILLIGRAM(S): at 12:48

## 2020-05-07 RX ADMIN — LIDOCAINE 1 PATCH: 4 CREAM TOPICAL at 07:09

## 2020-05-07 RX ADMIN — INSULIN GLARGINE 12 UNIT(S): 100 INJECTION, SOLUTION SUBCUTANEOUS at 23:24

## 2020-05-07 RX ADMIN — ENOXAPARIN SODIUM 40 MILLIGRAM(S): 100 INJECTION SUBCUTANEOUS at 12:48

## 2020-05-07 RX ADMIN — Medication 2: at 17:40

## 2020-05-07 RX ADMIN — Medication 50 GRAM(S): at 23:24

## 2020-05-07 RX ADMIN — PIPERACILLIN AND TAZOBACTAM 25 GRAM(S): 4; .5 INJECTION, POWDER, LYOPHILIZED, FOR SOLUTION INTRAVENOUS at 18:47

## 2020-05-07 RX ADMIN — DEXTROSE MONOHYDRATE, SODIUM CHLORIDE, AND POTASSIUM CHLORIDE 90 MILLILITER(S): 50; .745; 4.5 INJECTION, SOLUTION INTRAVENOUS at 05:31

## 2020-05-07 RX ADMIN — PIPERACILLIN AND TAZOBACTAM 25 GRAM(S): 4; .5 INJECTION, POWDER, LYOPHILIZED, FOR SOLUTION INTRAVENOUS at 01:43

## 2020-05-07 RX ADMIN — Medication 2 PACKET(S): at 19:36

## 2020-05-07 RX ADMIN — DEXTROSE MONOHYDRATE, SODIUM CHLORIDE, AND POTASSIUM CHLORIDE 90 MILLILITER(S): 50; .745; 4.5 INJECTION, SOLUTION INTRAVENOUS at 10:10

## 2020-05-07 RX ADMIN — Medication 600 MILLIGRAM(S): at 00:03

## 2020-05-07 NOTE — PROGRESS NOTE ADULT - ASSESSMENT
54F with perforated descending colon, possible malignant in origin vs diverticulitis although no evidence of diverticular disease. Currently hemodynamically stable with hyperglycemia (borderlines DKA). Now s/p LLQ IR drain. CEA 1.0 WNL.    Plan  - Advance diet to CLD  - DVT ppx  - f/u IR culture  - monitor IR drain output  - F/u CT today per GI reccs  - Lantus increased to 26 per endocrine, ISS decreased to low  - analgesia and antiemetics as needed    B Team Surgery [id number]

## 2020-05-07 NOTE — PROGRESS NOTE ADULT - SUBJECTIVE AND OBJECTIVE BOX
Chief Complaint: DM2    History: Continues on clear liquid diet.  patient is avoiding juice and only drinking water and soup.  No hypoglycemia events.  She refused lantus last night since she felt she is receiving too much medication.  She continues on IV fluids with dextrose.    MEDICATIONS  (STANDING):  acetaminophen   Tablet .. 650 milliGRAM(s) Oral every 6 hours  dextrose 5% + sodium chloride 0.9% with potassium chloride 20 mEq/L 1000 milliLiter(s) (90 mL/Hr) IV Continuous <Continuous>  dextrose 5%. 1000 milliLiter(s) (50 mL/Hr) IV Continuous <Continuous>  dextrose 50% Injectable 12.5 Gram(s) IV Push once  dextrose 50% Injectable 25 Gram(s) IV Push once  dextrose 50% Injectable 25 Gram(s) IV Push once  enoxaparin Injectable 40 milliGRAM(s) SubCutaneous daily  ibuprofen  Tablet. 600 milliGRAM(s) Oral every 6 hours  insulin glargine Injectable (LANTUS) 26 Unit(s) SubCutaneous at bedtime  insulin lispro (HumaLOG) corrective regimen sliding scale   SubCutaneous three times a day before meals  insulin lispro (HumaLOG) corrective regimen sliding scale   SubCutaneous at bedtime  lidocaine   Patch 1 Patch Transdermal daily  piperacillin/tazobactam IVPB.. 3.375 Gram(s) IV Intermittent every 8 hours  potassium phosphate / sodium phosphate powder 2 Packet(s) Oral two times a day    MEDICATIONS  (PRN):  dextrose 40% Gel 15 Gram(s) Oral once PRN Blood Glucose LESS THAN 70 milliGRAM(s)/deciliter  glucagon  Injectable 1 milliGRAM(s) IntraMuscular once PRN Glucose LESS THAN 70 milligrams/deciliter  oxyCODONE    IR 5 milliGRAM(s) Oral every 6 hours PRN Moderate Pain (4 - 6)      Allergies    No Known Allergies    Intolerances      Review of Systems:  GI: No nausea, vomiting, abdominal pain is improved  ALL OTHER SYSTEMS REVIEWED AND NEGATIVE      PHYSICAL EXAM:  VITALS: T(C): 36.8 (05-07-20 @ 13:31)  T(F): 98.2 (05-07-20 @ 13:31), Max: 98.4 (05-07-20 @ 09:24)  HR: 84 (05-07-20 @ 13:31) (81 - 92)  BP: 123/76 (05-07-20 @ 13:31) (113/72 - 126/77)  RR:  (17 - 18)  SpO2:  (98% - 100%)  Wt(kg): --  GENERAL: NAD, well-groomed, well-developed  EYES: No proptosis, no lid lag, anicteric  HEENT:  Atraumatic, Normocephalic, moist mucous membranes  PSYCH: Alert and oriented x 3, normal affect, normal mood    CAPILLARY BLOOD GLUCOSE      POCT Blood Glucose.: 149 mg/dL (07 May 2020 12:07)  POCT Blood Glucose.: 147 mg/dL (07 May 2020 08:18)  POCT Blood Glucose.: 102 mg/dL (06 May 2020 21:25)  POCT Blood Glucose.: 91 mg/dL (06 May 2020 17:15)      05-07    137  |  105  |  5<L>  ----------------------------<  162<H>  3.5   |  21<L>  |  0.36<L>    EGFR if : 144  EGFR if non : 124    Ca    7.6<L>      05-07  Mg     1.9     05-07  Phos  2.2     05-07    TPro  7.1  /  Alb  2.9<L>  /  TBili  0.4  /  DBili  x   /  AST  9   /  ALT  12  /  AlkPhos  108  05-05      A1C with Estimated Average Glucose: 12.9 % (05-03-20 @ 18:04)      Thyroid Function Tests:

## 2020-05-07 NOTE — PROGRESS NOTE ADULT - SUBJECTIVE AND OBJECTIVE BOX
Vascular & Interventional Radiology    HPI: 54y Female with perforated descending colon, possible malignant in origin vs diverticulitis although no evidence of diverticular disease.    Allergies:   Medications (Abx/Cardiac/Anticoagulation/Blood Products)  enoxaparin Injectable: 40 milliGRAM(s) SubCutaneous (05-07 @ 12:48)  piperacillin/tazobactam IVPB..: 25 mL/Hr IV Intermittent (05-07 @ 10:11)    Data:    T(C): 36.8  HR: 84  BP: 123/76  RR: 17  SpO2: 100%    -WBC 11.74 / HgB 10.7 / Hct 33.2 / Plt 583  -Na 137 / Cl 105 / BUN 5 / Glucose 162  -K 3.5 / CO2 21 / Cr 0.36  -ALT -- / Alk Phos -- / T.Bili --  -INR1.44    Imaging:   CT 5/3/20: 4.5 x 4.3 x 2.5 cm fluid and gas containing collection with rim abutting the anterior aspect of the descending colon and the left upper quadrant (2:49). There is surrounding inflammatory change. Findings are suggestive of complicated diverticulitis with abscess formation. However no discrete colonic diverticula are present. Malignancy is not excluded from the differential.    CT 5/7/20: Near-complete interval resolution of abscess collection in the left upper quadrant of abdomen.    Assessment/Plan:   54y Female with w/ perforated descending colon and left upper quadrant abscess s/p CT guided drainage. Repeat CT today with near-complete resolution of collection. Mildly increased pelvic free fluid likely related to 3rd spacing given new/increased anasarca. Mild left oblique muscle edema may explain patient's reported flank pain.    - Continue with daily site cleaning and dressing change daily.  - Record output every 8 hours in the hospital and daily at home  - Flush the catheter with 5 – 10 mL of saline every 8-12 hours to prevent catheter occlusion  - If there is leakage around the catheter, leakage or pain with flushing, or a significant change in output or position, please consult IR for evaluation   - Patient may f/u with IR for tube check in 1-2 weeks or when drain output <10mL /24 hours. Please call [phone] to make an appointment.    [id number].

## 2020-05-07 NOTE — CHART NOTE - NSCHARTNOTEFT_GEN_A_CORE
Patient previously consulted (5/5), now reconsulted for diabetes education as patient was in too much pain and unable to retain information provided regarding diabetes management. Unable to conduct a face to face interview or nutrition-focused physical exam due to limited contact restrictions related to patient's medical condition and isolation precautions. Obtained subjective information from extensive chart review. Provided diabetes education. No change in nutrition status noted.     Diet Order: Diet, Clear Liquid:   Consistent Carbohydrate {Evening Snack} (CSTCHOSN) (05-06-20 @ 06:02)    Current Weight: 88.5 kg (5/4)    MEDICATIONS  (STANDING):  acetaminophen   Tablet .. 650 milliGRAM(s) Oral every 6 hours  dextrose 5% + sodium chloride 0.9% with potassium chloride 20 mEq/L 1000 milliLiter(s) (90 mL/Hr) IV Continuous <Continuous>  dextrose 5%. 1000 milliLiter(s) (50 mL/Hr) IV Continuous <Continuous>  dextrose 50% Injectable 12.5 Gram(s) IV Push once  dextrose 50% Injectable 25 Gram(s) IV Push once  dextrose 50% Injectable 25 Gram(s) IV Push once  enoxaparin Injectable 40 milliGRAM(s) SubCutaneous daily  ibuprofen  Tablet. 600 milliGRAM(s) Oral every 6 hours  insulin glargine Injectable (LANTUS) 26 Unit(s) SubCutaneous at bedtime  insulin lispro (HumaLOG) corrective regimen sliding scale   SubCutaneous three times a day before meals  insulin lispro (HumaLOG) corrective regimen sliding scale   SubCutaneous at bedtime  lidocaine   Patch 1 Patch Transdermal daily  piperacillin/tazobactam IVPB.. 3.375 Gram(s) IV Intermittent every 8 hours  potassium phosphate / sodium phosphate powder 2 Packet(s) Oral two times a day    MEDICATIONS  (PRN):  dextrose 40% Gel 15 Gram(s) Oral once PRN Blood Glucose LESS THAN 70 milliGRAM(s)/deciliter  glucagon  Injectable 1 milliGRAM(s) IntraMuscular once PRN Glucose LESS THAN 70 milligrams/deciliter  oxyCODONE    IR 5 milliGRAM(s) Oral every 6 hours PRN Moderate Pain (4 - 6)    Pertinent Labs: 05-07 Na 137 mmol/L Glu 162 mg/dL<H> K+ 3.5 mmol/L Cr 0.36 mg/dL<L> BUN 5 mg/dL<L> Phos 2.2 mg/dL<L>  05-07 @ 12:07 POCT 149 mg/dL  05-07 @ 08:18 POCT 147 mg/dL  05-06 @ 21:25 POCT 102 mg/dL  05-06 @ 17:15 POCT 91 mg/dL    Skin: Intact, no edema    GI Symptoms: Abdominal pain improving, N/V resolved    Estimated Needs:   [x] no change since previous assessment  [] recalculated    Nutrition Diagnosis:  Inadequate Energy Intake  [] new [x] ongoing [] resolved     Goal(s):  1. Patient to meet > 75% estimated energy needs    Interventions:   1. Advance diet when medically appropriate.     Monitoring and Evaluation:   1. Continue care with outpatient RD services for diabetes management   2. Monitor weights, labs, BMs, skin integrity, and PO intake  3. Inpatient RD services to remain available     Tiffany Mohamud RDN   Pager #[phone] Patient previously consulted (5/5), now reconsulted for diabetes education as patient was in too much pain and unable to retain information provided regarding diabetes management. Unable to conduct a face to face interview or nutrition-focused physical exam due to limited contact restrictions related to patient's medical condition and isolation precautions. Obtained subjective information from extensive chart review. Attempted to call patient 3x to provide continued diabetes education but was unsuccessful. RD remains available.     Diet Order: Diet, Clear Liquid:   Consistent Carbohydrate {Evening Snack} (CSTCHOSN) (05-06-20 @ 06:02)    Current Weight: 88.5 kg (5/4)    MEDICATIONS  (STANDING):  acetaminophen   Tablet .. 650 milliGRAM(s) Oral every 6 hours  dextrose 5% + sodium chloride 0.9% with potassium chloride 20 mEq/L 1000 milliLiter(s) (90 mL/Hr) IV Continuous <Continuous>  dextrose 5%. 1000 milliLiter(s) (50 mL/Hr) IV Continuous <Continuous>  dextrose 50% Injectable 12.5 Gram(s) IV Push once  dextrose 50% Injectable 25 Gram(s) IV Push once  dextrose 50% Injectable 25 Gram(s) IV Push once  enoxaparin Injectable 40 milliGRAM(s) SubCutaneous daily  ibuprofen  Tablet. 600 milliGRAM(s) Oral every 6 hours  insulin glargine Injectable (LANTUS) 26 Unit(s) SubCutaneous at bedtime  insulin lispro (HumaLOG) corrective regimen sliding scale   SubCutaneous three times a day before meals  insulin lispro (HumaLOG) corrective regimen sliding scale   SubCutaneous at bedtime  lidocaine   Patch 1 Patch Transdermal daily  piperacillin/tazobactam IVPB.. 3.375 Gram(s) IV Intermittent every 8 hours  potassium phosphate / sodium phosphate powder 2 Packet(s) Oral two times a day    MEDICATIONS  (PRN):  dextrose 40% Gel 15 Gram(s) Oral once PRN Blood Glucose LESS THAN 70 milliGRAM(s)/deciliter  glucagon  Injectable 1 milliGRAM(s) IntraMuscular once PRN Glucose LESS THAN 70 milligrams/deciliter  oxyCODONE    IR 5 milliGRAM(s) Oral every 6 hours PRN Moderate Pain (4 - 6)    Pertinent Labs: 05-07 Na 137 mmol/L Glu 162 mg/dL<H> K+ 3.5 mmol/L Cr 0.36 mg/dL<L> BUN 5 mg/dL<L> Phos 2.2 mg/dL<L>  05-07 @ 12:07 POCT 149 mg/dL  05-07 @ 08:18 POCT 147 mg/dL  05-06 @ 21:25 POCT 102 mg/dL  05-06 @ 17:15 POCT 91 mg/dL    Skin: Intact, no edema    GI Symptoms: Abdominal pain improving, N/V resolved    Estimated Needs:   [x] no change since previous assessment  [] recalculated    Nutrition Diagnosis:  Inadequate Energy Intake  [] new [x] ongoing [] resolved     Goal(s):  1. Patient to meet > 75% estimated energy needs    Interventions:   1. Advance diet when medically appropriate.     Monitoring and Evaluation:   1. Continue care with outpatient RD services for diabetes management   2. Monitor weights, labs, BMs, skin integrity, and PO intake  3. Inpatient RD services to remain available     Tiffany Mohamud RDN   Pager #[phone]

## 2020-05-07 NOTE — PROGRESS NOTE ADULT - ASSESSMENT
54F F with uncontrolled Type 2 DM (A1C 12.9%), presents with 3 days of acute abdominal pain located in LUQ. Admitted with concern for colonic perforation.       Problem/Recommendation - 1:  Problem: Type 2 diabetes mellitus with hyperglycemia, without long-term current use of insulin.  -Patient with uncontrolled Type 2 Dm (A1C 12.9%)   -Currently on clear liquid diet and receiving dextrose fluids.  BG are stable today but this is following patient refusing Lantus last night. Also she is avoiding juice and consuming low carb liquids.  In light of that will decrease Lantus to 12 units qhs tonight.  -Continue correction scale low premeal and low bedtime    -DC plan likely basal/bolus regimen, doses to be determined. Patient will need prescription for glucometer, test strips, and lancets prior to dc as well.  -Patient will need education by bedside nurse on insulin pen, glucometer use prior to discharge.  -Patient can f/u with endocrinology at [address] [place] [address] [phone].    Problem/Recommendation - 2:  ·  Problem: Hypertension.  Recommendation: -BP at goal <130/80 off meds. Monitor BP.     Problem/Recommendation - 3:  ·  Problem: Hyperlipidemia.  Recommendation: -can check outpatient lipid profile.

## 2020-05-07 NOTE — PROGRESS NOTE ADULT - SUBJECTIVE AND OBJECTIVE BOX
Morning Surgical Progress Note  Patient is a 54y old  Female who presents with a chief complaint of Abdominal pain (06 May 2020 17:23)      SUBJECTIVE: Patient seen and examined at bedside with surgical team, patient states that her pain is improving. She denies n/v/d/c. Patient is passing flatus and having bowel movements.     Vital Signs Last 24 Hrs  T(C): 36.4 (06 May 2020 21:26), Max: 36.9 (06 May 2020 02:06)  T(F): 97.6 (06 May 2020 21:26), Max: 98.5 (06 May 2020 02:06)  HR: 92 (06 May 2020 21:26) (74 - 92)  BP: 126/77 (06 May 2020 21:26) (115/72 - 130/76)  BP(mean): --  RR: 18 (06 May 2020 21:26) (17 - 18)  SpO2: 98% (06 May 2020 21:26) (98% - 100%)I&O's Detail    05 May 2020 07:01  -  06 May 2020 07:00  --------------------------------------------------------  IN:  Total IN: 0 mL    OUT:    Bulb: 5 mL    Voided: 300 mL  Total OUT: 305 mL    Total NET: -305 mL      06 May 2020 07:01  -  07 May 2020 00:59  --------------------------------------------------------  IN:    dextrose 5% + sodium chloride 0.9% with potassium chloride 20 mEq/L: 990 mL    IV PiggyBack: 332 mL  Total IN: 1322 mL    OUT:    Bulb: 6 mL    Voided: 1420 mL  Total OUT: 1426 mL    Total NET: -104 mL      MEDICATIONS  (STANDING):  acetaminophen   Tablet .. 650 milliGRAM(s) Oral every 6 hours  dextrose 5% + sodium chloride 0.9% with potassium chloride 20 mEq/L 1000 milliLiter(s) (90 mL/Hr) IV Continuous <Continuous>  dextrose 5%. 1000 milliLiter(s) (50 mL/Hr) IV Continuous <Continuous>  dextrose 50% Injectable 12.5 Gram(s) IV Push once  dextrose 50% Injectable 25 Gram(s) IV Push once  dextrose 50% Injectable 25 Gram(s) IV Push once  enoxaparin Injectable 40 milliGRAM(s) SubCutaneous daily  ibuprofen  Tablet. 600 milliGRAM(s) Oral every 6 hours  insulin glargine Injectable (LANTUS) 26 Unit(s) SubCutaneous at bedtime  insulin lispro (HumaLOG) corrective regimen sliding scale   SubCutaneous three times a day before meals  insulin lispro (HumaLOG) corrective regimen sliding scale   SubCutaneous at bedtime  lidocaine   Patch 1 Patch Transdermal daily  piperacillin/tazobactam IVPB.. 3.375 Gram(s) IV Intermittent every 8 hours    MEDICATIONS  (PRN):  dextrose 40% Gel 15 Gram(s) Oral once PRN Blood Glucose LESS THAN 70 milliGRAM(s)/deciliter  glucagon  Injectable 1 milliGRAM(s) IntraMuscular once PRN Glucose LESS THAN 70 milligrams/deciliter  oxyCODONE    IR 5 milliGRAM(s) Oral every 6 hours PRN Moderate Pain (4 - 6)      Physical Exam  Constitutional: A&Ox3, NAD  Respiratory: CTA b/l  Cardiac: RRR, S1 and S2, no m/r/g  Gastrointestinal: abdomen soft, mildly distended, tender to palpation (especially at drain site in RUQ). Output from drain is seropurulent    LABS:                        10.4   16.91 )-----------( 568      ( 06 May 2020 05:18 )             33.2     05-06    134<L>  |  102  |  8   ----------------------------<  283<H>  3.6   |  21<L>  |  0.38<L>    Ca    8.0<L>      06 May 2020 05:18  Phos  1.4     05-06  Mg     2.1     05-06    TPro  7.1  /  Alb  2.9<L>  /  TBili  0.4  /  DBili  x   /  AST  9   /  ALT  12  /  AlkPhos  108  05-05      LIVER FUNCTIONS - ( 05 May 2020 06:19 )  Alb: 2.9 g/dL / Pro: 7.1 g/dL / ALK PHOS: 108 u/L / ALT: 12 u/L / AST: 9 u/L / GGT: x

## 2020-05-08 ENCOUNTER — TRANSCRIPTION ENCOUNTER (OUTPATIENT)
Age: 55
End: 2020-05-08

## 2020-05-08 VITALS
HEART RATE: 94 BPM | OXYGEN SATURATION: 100 % | DIASTOLIC BLOOD PRESSURE: 74 MMHG | SYSTOLIC BLOOD PRESSURE: 137 MMHG | TEMPERATURE: 99 F | RESPIRATION RATE: 18 BRPM

## 2020-05-08 LAB
ANION GAP SERPL CALC-SCNC: 12 MMO/L — SIGNIFICANT CHANGE UP (ref 7–14)
BUN SERPL-MCNC: 4 MG/DL — LOW (ref 7–23)
CALCIUM SERPL-MCNC: 7.7 MG/DL — LOW (ref 8.4–10.5)
CHLORIDE SERPL-SCNC: 102 MMOL/L — SIGNIFICANT CHANGE UP (ref 98–107)
CO2 SERPL-SCNC: 19 MMOL/L — LOW (ref 22–31)
CREAT SERPL-MCNC: 0.36 MG/DL — LOW (ref 0.5–1.3)
CULTURE RESULTS: SIGNIFICANT CHANGE UP
CULTURE RESULTS: SIGNIFICANT CHANGE UP
GLUCOSE BLDC GLUCOMTR-MCNC: 143 MG/DL — HIGH (ref 70–99)
GLUCOSE BLDC GLUCOMTR-MCNC: 150 MG/DL — HIGH (ref 70–99)
GLUCOSE SERPL-MCNC: 159 MG/DL — HIGH (ref 70–99)
HCT VFR BLD CALC: 32.9 % — LOW (ref 34.5–45)
HGB BLD-MCNC: 10.4 G/DL — LOW (ref 11.5–15.5)
MAGNESIUM SERPL-MCNC: 2.3 MG/DL — SIGNIFICANT CHANGE UP (ref 1.6–2.6)
MCHC RBC-ENTMCNC: 25.2 PG — LOW (ref 27–34)
MCHC RBC-ENTMCNC: 31.6 % — LOW (ref 32–36)
MCV RBC AUTO: 79.9 FL — LOW (ref 80–100)
NRBC # FLD: 0.04 K/UL — SIGNIFICANT CHANGE UP (ref 0–0)
PHOSPHATE SERPL-MCNC: 2.8 MG/DL — SIGNIFICANT CHANGE UP (ref 2.5–4.5)
PLATELET # BLD AUTO: 586 K/UL — HIGH (ref 150–400)
PMV BLD: 9.5 FL — SIGNIFICANT CHANGE UP (ref 7–13)
POTASSIUM SERPL-MCNC: 4.2 MMOL/L — SIGNIFICANT CHANGE UP (ref 3.5–5.3)
POTASSIUM SERPL-SCNC: 4.2 MMOL/L — SIGNIFICANT CHANGE UP (ref 3.5–5.3)
RBC # BLD: 4.12 M/UL — SIGNIFICANT CHANGE UP (ref 3.8–5.2)
RBC # FLD: 15.9 % — HIGH (ref 10.3–14.5)
SODIUM SERPL-SCNC: 133 MMOL/L — LOW (ref 135–145)
SPECIMEN SOURCE: SIGNIFICANT CHANGE UP
SPECIMEN SOURCE: SIGNIFICANT CHANGE UP
WBC # BLD: 12.54 K/UL — HIGH (ref 3.8–10.5)
WBC # FLD AUTO: 12.54 K/UL — HIGH (ref 3.8–10.5)

## 2020-05-08 PROCEDURE — 99232 SBSQ HOSP IP/OBS MODERATE 35: CPT

## 2020-05-08 PROCEDURE — 99232 SBSQ HOSP IP/OBS MODERATE 35: CPT | Mod: GC

## 2020-05-08 PROCEDURE — 99231 SBSQ HOSP IP/OBS SF/LOW 25: CPT

## 2020-05-08 RX ORDER — ENOXAPARIN SODIUM 100 MG/ML
12 INJECTION SUBCUTANEOUS
Qty: 360 | Refills: 0
Start: 2020-05-08 | End: 2020-06-06

## 2020-05-08 RX ORDER — METFORMIN HYDROCHLORIDE 850 MG/1
1 TABLET ORAL
Qty: 14 | Refills: 0
Start: 2020-05-08 | End: 2020-05-14

## 2020-05-08 RX ORDER — METFORMIN HYDROCHLORIDE 850 MG/1
1 TABLET ORAL
Qty: 60 | Refills: 0
Start: 2020-05-08 | End: 2020-06-06

## 2020-05-08 RX ORDER — IBUPROFEN 200 MG
1 TABLET ORAL
Qty: 28 | Refills: 0
Start: 2020-05-08 | End: 2020-05-14

## 2020-05-08 RX ADMIN — LIDOCAINE 1 PATCH: 4 CREAM TOPICAL at 13:44

## 2020-05-08 RX ADMIN — POTASSIUM PHOSPHATE, MONOBASIC POTASSIUM PHOSPHATE, DIBASIC 83.33 MILLIMOLE(S): 236; 224 INJECTION, SOLUTION INTRAVENOUS at 00:44

## 2020-05-08 RX ADMIN — PIPERACILLIN AND TAZOBACTAM 25 GRAM(S): 4; .5 INJECTION, POWDER, LYOPHILIZED, FOR SOLUTION INTRAVENOUS at 01:51

## 2020-05-08 NOTE — DISCHARGE NOTE NURSING/CASE MANAGEMENT/SOCIAL WORK - PATIENT PORTAL LINK FT
You can access the FollowMyHealth Patient Portal offered by [facility] by registering at the following website: [email or web address]. By joining [employer]’s FollowMyHealth portal, you will also be able to view your health information using other applications (apps) compatible with our system.

## 2020-05-08 NOTE — DISCHARGE NOTE PROVIDER - NSFOLLOWUPCLINICS_GEN_ALL_ED_FT
[facility]  Gastroenterology  [address] [place] [address]  Phone: [phone]  Fax:   Follow Up Time: 2 weeks

## 2020-05-08 NOTE — DISCHARGE NOTE PROVIDER - HOSPITAL COURSE
IRENE DONALDSON is a 54y Female who was admitted to [facility] for Abscess of peritoneum. Diagnosis was made via CT. On 5/4 the pt underwent drainage of the abscess with IR. The pt was noted to have uncontrolled type 2 diabetes with A1c >10. Endocrinology consult was placed. On 5/5 the pt endorsed abdominal pain and distension. She was made NPO and abdominal film was obtained. Her diet was advanced the following day. She is now tolerating a regular diet. On 5/8 she was switched from IV zosyn to PO augmentin. Final endocrinology recommendations were obtained prior to discharge.        At time of discharge, pt was tolerating a regular diet, voiding/stooling spontaneously, ambulating, and pain was well-controlled. Patient and family felt ready for discharge.

## 2020-05-08 NOTE — DISCHARGE NOTE NURSING/CASE MANAGEMENT/SOCIAL WORK - NSDCPNINST_GEN_ALL_CORE
Call MD for follow up appointment. Notify MD if you have any signs/symptoms of infection; fever greater than 101, nausea/vomiting, diarrhea, pain unrelieved with pain medication, any redness/swelling or drainage around incision site. RAVINDRA drain as instructed, measure output daily and keep a record. Drink plenty of fluids. Diet as tolerated. Complete full course of antibiotics.

## 2020-05-08 NOTE — DISCHARGE NOTE PROVIDER - PROVIDER TOKENS
PROVIDER:[TOKEN:[[id number]],FOLLOWUP:[2 weeks]],PROVIDER:[TOKEN:[[id number]],FOLLOWUP:[2 weeks]]

## 2020-05-08 NOTE — DISCHARGE NOTE PROVIDER - CARE PROVIDERS DIRECT ADDRESSES
[email or web address]

## 2020-05-08 NOTE — PROGRESS NOTE ADULT - ASSESSMENT
Impression:  #Pericolonic fluid collection: S/P drain by IR, RAVINDRA drain in place. Cannot rule out underlying diverticulitis vs underlying colonic mass. Repeat CT showed resolution of collection, no mass or diverticulitis seen.   #Type 2 diabetes: On basal bolus insulin.     Recommendations:  - continue to monitor drain output  - pt will need to follow up with GI in 1 month to schedule a colonoscopy, however advised to first follow up with IR/surgery regarding drain management (GI clinic number given to patient to call in 2 weeks)  - rest of care per primary team

## 2020-05-08 NOTE — PROGRESS NOTE ADULT - ASSESSMENT
54F with perforated diverticulitis with intraabdominal collection s/p IR drain placement. Near complete resolution of collection demonstrated on CT 5/7. Pain and tenderness significantly improved, leukocytosis improving, tolerating regular diet.     Plan  - Continue regular diet CC  - DVT ppx  - f/u IR culture  - monitor IR drain output  - Appreciate GI recommendations  - Lantus increased to 12 per Endocrine, monitor FS on new regimen and diet  - Analgesia and antiemetics as needed    B Team Surgery [id number] 54F with perforated diverticulitis with intraabdominal collection s/p IR drain placement. Near complete resolution of collection demonstrated on CT 5/7. Pain and tenderness significantly improved, leukocytosis improving, tolerating regular diet.     Plan  - Continue regular diet CC  - DVT ppx  - f/u IR culture  - monitor IR drain output  - Appreciate GI recommendations  - Lantus increased to 12 per Endocrine, monitor FS on new regimen and diet  - Analgesia and antiemetics as needed  - Discharge planning: please provide final endocrine recommendations for discharge      B Team Surgery [id number]

## 2020-05-08 NOTE — PROGRESS NOTE ADULT - SUBJECTIVE AND OBJECTIVE BOX
Chief Complaint:  Patient is a 54y old  Female who presents with a chief complaint of Abdominal pain (08 May 2020 07:30)      Interval Events: Pt denies nausea, vomiting, abd pain. Has pain around the RAVINDRA site but otherwise offers no complaints.   ROS: All 12 point system except listed above were otherwise negative.    Allergies:  No Known Allergies        Hospital Medications:  acetaminophen   Tablet .. 650 milliGRAM(s) Oral every 6 hours  amoxicillin  875 milliGRAM(s)/clavulanate 1 Tablet(s) Oral every 12 hours  dextrose 40% Gel 15 Gram(s) Oral once PRN  dextrose 5%. 1000 milliLiter(s) IV Continuous <Continuous>  dextrose 50% Injectable 12.5 Gram(s) IV Push once  dextrose 50% Injectable 25 Gram(s) IV Push once  dextrose 50% Injectable 25 Gram(s) IV Push once  enoxaparin Injectable 40 milliGRAM(s) SubCutaneous daily  glucagon  Injectable 1 milliGRAM(s) IntraMuscular once PRN  ibuprofen  Tablet. 600 milliGRAM(s) Oral every 6 hours  insulin glargine Injectable (LANTUS) 12 Unit(s) SubCutaneous at bedtime  insulin lispro (HumaLOG) corrective regimen sliding scale   SubCutaneous three times a day before meals  insulin lispro (HumaLOG) corrective regimen sliding scale   SubCutaneous at bedtime  lidocaine   Patch 1 Patch Transdermal daily  oxyCODONE    IR 5 milliGRAM(s) Oral every 6 hours PRN  polyethylene glycol 3350 17 Gram(s) Oral daily PRN  senna 2 Tablet(s) Oral at bedtime      PMHX/PSHX:  Uncontrolled type 2 diabetes mellitus with complication  No pertinent past medical history  No significant past surgical history      Family history:    There is no family history of peptic ulcer disease, gastric cancer, colon polyps, colon cancer, celiac disease, biliary, hepatic, or pancreatic disease.  None of the female relatives have breast, uterine, or ovarian cancer.     PHYSICAL EXAM:   Vital Signs:  Vital Signs Last 24 Hrs  T(C): 37.1 (08 May 2020 11:19), Max: 37.1 (08 May 2020 11:19)  T(F): 98.8 (08 May 2020 11:19), Max: 98.8 (08 May 2020 11:19)  HR: 94 (08 May 2020 11:19) (83 - 94)  BP: 137/74 (08 May 2020 11:19) (115/73 - 137/74)  BP(mean): --  RR: 18 (08 May 2020 11:19) (17 - 18)  SpO2: 100% (08 May 2020 11:19) (98% - 100%)  Daily     Daily       GENERAL:  Appears stated age  HEENT:  NC/AT,  conjunctivae clear and pink, sclera -anicteric  CHEST:  Full & symmetric excursion, no increased effort, breath sounds clear  HEART:  Regular rhythm, S1, S2  ABDOMEN:  Soft, non-tender, non-distended, normoactive bowel sounds, LLQ drain to bulb suction, minimal serous output  EXTREMITIES:  no cyanosis,clubbing or edema  SKIN:  No rash/erythema/ecchymoses  NEURO:  Alert, oriented, no asterixis  PSYCH: Alert and oriented  LABS:                        10.4   12.54 )-----------( 586      ( 08 May 2020 06:08 )             32.9     Mean Cell Volume: 79.9 fL (05-08-20 @ 06:08)    05-08    133<L>  |  102  |  4<L>  ----------------------------<  159<H>  4.2   |  19<L>  |  0.36<L>    Ca    7.7<L>      08 May 2020 06:08  Phos  2.8     05-08  Mg     2.3     05-08                                    10.4   12.54 )-----------( 586      ( 08 May 2020 06:08 )             32.9                         10.7   11.74 )-----------( 583      ( 07 May 2020 05:51 )             33.2                         10.4   16.91 )-----------( 568      ( 06 May 2020 05:18 )             33.2     Imaging:        < from: CT Abdomen and Pelvis w/ IV Cont (05.07.20 @ 14:15) >  FINDINGS:    LOWER CHEST: Bibasilar atelectatic changes.New small pleural effusions.    LIVER: Within normal limits.  BILE DUCTS: Normal caliber.  GALLBLADDER: Within normal limits.  SPLEEN: Within normal limits.  PANCREAS: Within normal limits.  ADRENALS: Within normal limits.  KIDNEYS/URETERS: Within normal limits.    BLADDER: Within normal limits.  REPRODUCTIVE ORGANS: Uterus and adnexa within normal limits.    BOWEL: Complete interval resolution of an abscess of the left upper quadrant of abdomen due to the placement of the pigtail catheter. There is persistent thickening of an adjacent jejunal loop (2:76) likely secondary to the adjacent inflammation. There is distention of the jejunum and proximal ileum without vikas sonographic transition. This is likely due to ileus. Appendix is normal.  PERITONEUM: Pelvic ascites increased compared with prior study. There is enhancement of the pelvic peritoneum adjacent to the ascites suggestive of infection. No vikas well-defined abscess collection is identified.    VESSELS: Within normal limits.  RETROPERITONEUM/LYMPH NODES: No lymphadenopathy.      ABDOMINAL WALL: 3.5 cm intra muscular lipoma of the right upper flank (3:32). Small 2 cm fat-containing umbilical hernia. Mild anasarca.  BONES: Mild degenerative changes of the thoracolumbar junction.    IMPRESSION:     Near-complete interval resolution of abscess collection in the left upper quadrant of abdomen.  Dilatation of small bowel likely due to ileus.  Pelvic ascites increased compared with prior study with peritoneal enhancement which may be indicative of infection  No vikas well-defined abscess collection is identified.                < end of copied text >

## 2020-05-08 NOTE — DISCHARGE NOTE PROVIDER - CARE PROVIDER_API CALL
Michael Preston)  Surgery; Surgical Critical Care  [address], [address]  [place] [address]  Phone: [phone]  Fax: [phone]  Follow Up Time: 2 weeks    Carrol No)  EndocrinologyMetabDiabetes  [address] [place] [address]  Phone: [phone]  Fax: [phone]  Follow Up Time: 2 weeks

## 2020-05-08 NOTE — PROGRESS NOTE ADULT - ASSESSMENT
54F F with uncontrolled Type 2 DM (A1C 12.9%), presents with 3 days of acute abdominal pain located in LUQ. Admitted with concern for colonic perforation.       Problem/Recommendation - 1:  Problem: Type 2 diabetes mellitus with hyperglycemia, without long-term current use of insulin.  -Patient with uncontrolled Type 2 Dm (A1C 12.9%)   BG are stable today on solid food diet.    -DC plan - since doing well with just basal inpatient can downgrade dc plan from basal/bolus to basal plus metformin.  Recommend Lantus solostar pen (or equivalent) 12 units qhs  metformin 500mg BID with food, after 1 week increase to 1000mg BID  Patient will need prescription for glucometer, test strips, and lancets prior to dc as well.  -Patient can f/u with endocrinology at [address] [place] [address] [phone].    Problem/Recommendation - 2:  ·  Problem: Hypertension.  Recommendation: -BP at goal <130/80 off meds. Monitor BP.     Problem/Recommendation - 3:  ·  Problem: Hyperlipidemia.  Recommendation: -can check outpatient lipid profile. 54F F with uncontrolled Type 2 DM (A1C 12.9%), presents with 3 days of acute abdominal pain located in LUQ. Admitted with concern for colonic perforation.       Problem/Recommendation - 1:  Problem: Type 2 diabetes mellitus with hyperglycemia, without long-term current use of insulin.  -Patient with uncontrolled Type 2 Dm (A1C 12.9%)   BG are stable today on solid food diet.    -DC plan - since doing well with just basal inpatient can downgrade dc plan from basal/bolus to basal plus metformin.  Recommend Lantus solostar pen (or equivalent) 12 units qhs, BD kofi pen needles  metformin 500mg BID with food, after 1 week increase to 1000mg BID  Patient will need prescription for glucometer, test strips, and lancets prior to dc as well.  -Patient can f/u with endocrinology at [address] [place] [address] [phone].    Problem/Recommendation - 2:  ·  Problem: Hypertension.  Recommendation: -BP at goal <130/80 off meds. Monitor BP.     Problem/Recommendation - 3:  ·  Problem: Hyperlipidemia.  Recommendation: -can check outpatient lipid profile.

## 2020-05-08 NOTE — DISCHARGE NOTE PROVIDER - NSDCMRMEDTOKEN_GEN_ALL_CORE_FT
glucometer :   ibuprofen 600 mg oral tablet: 1 tab(s) orally every 6 hours, As Needed -for  pain   Lantus Solostar Pen 100 units/mL subcutaneous solution: 12 unit(s) subcutaneous once a day (at bedtime)   metFORMIN 1000 mg oral tablet: 1 tab(s) orally 2 times a day with meals. Start this medication after taking the 500mg dose for 1 week.  metFORMIN 500 mg oral tablet: 1 tab(s) orally 2 times a day

## 2020-05-08 NOTE — PROGRESS NOTE ADULT - SUBJECTIVE AND OBJECTIVE BOX
Chief Complaint: DM2    History: Tolerating solid food.  Ambulating.  Planned for discharge home today.    MEDICATIONS  (STANDING):  acetaminophen   Tablet .. 650 milliGRAM(s) Oral every 6 hours  amoxicillin  875 milliGRAM(s)/clavulanate 1 Tablet(s) Oral every 12 hours  dextrose 5%. 1000 milliLiter(s) (50 mL/Hr) IV Continuous <Continuous>  dextrose 50% Injectable 12.5 Gram(s) IV Push once  dextrose 50% Injectable 25 Gram(s) IV Push once  dextrose 50% Injectable 25 Gram(s) IV Push once  enoxaparin Injectable 40 milliGRAM(s) SubCutaneous daily  ibuprofen  Tablet. 600 milliGRAM(s) Oral every 6 hours  insulin glargine Injectable (LANTUS) 12 Unit(s) SubCutaneous at bedtime  insulin lispro (HumaLOG) corrective regimen sliding scale   SubCutaneous three times a day before meals  insulin lispro (HumaLOG) corrective regimen sliding scale   SubCutaneous at bedtime  lidocaine   Patch 1 Patch Transdermal daily  senna 2 Tablet(s) Oral at bedtime    MEDICATIONS  (PRN):  dextrose 40% Gel 15 Gram(s) Oral once PRN Blood Glucose LESS THAN 70 milliGRAM(s)/deciliter  glucagon  Injectable 1 milliGRAM(s) IntraMuscular once PRN Glucose LESS THAN 70 milligrams/deciliter  oxyCODONE    IR 5 milliGRAM(s) Oral every 6 hours PRN Moderate Pain (4 - 6)  polyethylene glycol 3350 17 Gram(s) Oral daily PRN Constipation      Allergies    No Known Allergies    Intolerances      Review of Systems:    ALL OTHER SYSTEMS REVIEWED AND NEGATIVE      PHYSICAL EXAM:  VITALS: T(C): 37.1 (05-08-20 @ 11:19)  T(F): 98.8 (05-08-20 @ 11:19), Max: 98.8 (05-08-20 @ 11:19)  HR: 94 (05-08-20 @ 11:19) (83 - 94)  BP: 137/74 (05-08-20 @ 11:19) (115/73 - 137/74)  RR:  (17 - 18)  SpO2:  (98% - 100%)  Wt(kg): --  GENERAL: NAD, well-groomed, well-developed  EYES: No proptosis, no lid lag, anicteric  HEENT:  Atraumatic, Normocephalic, moist mucous membranes  PSYCH: Alert and oriented x 3, normal affect, normal mood    CAPILLARY BLOOD GLUCOSE      POCT Blood Glucose.: 150 mg/dL (08 May 2020 12:03)  POCT Blood Glucose.: 143 mg/dL (08 May 2020 08:46)  POCT Blood Glucose.: 191 mg/dL (07 May 2020 22:25)  POCT Blood Glucose.: 202 mg/dL (07 May 2020 17:20)      05-08    133<L>  |  102  |  4<L>  ----------------------------<  159<H>  4.2   |  19<L>  |  0.36<L>    EGFR if : 144  EGFR if non : 124    Ca    7.7<L>      05-08  Mg     2.3     05-08  Phos  2.8     05-08        A1C with Estimated Average Glucose: 12.9 % (05-03-20 @ 18:04)      Thyroid Function Tests:

## 2020-05-08 NOTE — PROGRESS NOTE ADULT - ATTENDING COMMENTS
-Resolution of left abd collection s/p IR drain  -Pelvic free fluid likely reactive, no drain needed at this time  -1-2 week follow up CT and tube check
Feels well- no fever N/V or abd. pain (except for localized discomfort at the perc. drain site). PE: comfortable/NAD. Abd.- soft, obese, NT/ND, LUQ drain site D/I. CT noted- abscess resolved. SB ileus noted but not symptomatic. Etiology of para-colic abscess not fully clear (?diverticular). Pt. planned for D/C and out pt Rx- complete oral Ab Rx. Disposition of perc drain as per surgery/IR. Advised out pt colonoscopy in 1 month after completed healing (and Rx for presumed diverticulitis).
Patient with perforated desecending colon with associated abscess. Patient on exam in discomfort with left greater than right abdominal pain. Hemodynamically stable. Plan for IR drainage today, will observe after drainage. Appreciate GI recommendations, continue with iv antibiotics, hydration, and bowel rest.    I have personally interviewed and examined this patient, reviewed pertinent labs and imaging, and discussed the case with colleagues, residents, and physician assistants on B Team rounds.    The active care issues are:  1. perforated descending colon with abscess    The Acute Care Surgery (B Team) Attending Group Practice:  Dr. Archana Howard, Dr. Tariq Romero, Dr. Michael Preston, Dr. Tom Edouard,     urgent issues - spectra [phone] or [id number]  nonurgent issues - [phone]  patient appointments or afterhours - [phone]
s/p IR drainage of pericolonic abscess 2/2 to descending colon perforation. Patient clinically much improved, pain significantly improved. WBC uptrended to 21.   Plan  repeat CT scan in 3-4 days, monitor drain output, daily drain care, IV abx, CLD, glucose control    I have personally interviewed and examined this patient, reviewed pertinent labs and imaging, and discussed the case with colleagues, residents, and physician assistants on B Team rounds.    The active care issues are:  1. pericolonic abscess    The Acute Care Surgery (B Team) Attending Group Practice:  Dr. Archana Howard, Dr. Tariq Romero, Dr. Michael Preston, Dr. Tom Edouard,     urgent issues - spectra [phone] or [phone]  nonurgent issues - [phone]  patient appointments or afterhours - [phone]
Feels better s/p IR drainage of left paracolic abscess. Abd. pain much decreased. Await cultures. Cont. Zosyn. F/U CBC and for repeat CT in few days for follow-up. Will warrant eventual colonoscopy- timing pending f/u imaging and clinical course.
I have personally interviewed and examined this patient, reviewed pertinent labs and imaging, and discussed the case with colleagues, residents, and physician assistants on B Team rounds.    Patient reports continued improved abdominal pain, tolerating clear liquid diet, drain with minimal output, downtrending wbc to 16  Plan for repeat CT scan tomorrow    The active care issues are:  1. pericolonic abscess  2. diabetes uncontrolled    The Acute Care Surgery (B Team) Attending Group Practice:  Dr. Archana Howard, Dr. Tariq Romero, Dr. Michael Preston, Dr. Tom Edouard,     urgent issues - spectra [phone] or [phone]  nonurgent issues - [phone]  patient appointments or afterhours - [phone]
Patient with repeat CT scan done yesterday with interval improvement of pericolonic abscess. Patient with downtrending markers of infection, hemodynamically stable and abdominal pain significantly improved. Pigtail 12.5cc/24hr minimal drainage.  Plan  d/c home today for 10 day course of abx  IR follow up in 1-2 weeks  f/u GI for colonoscopy after inflammation resolved  f/u with me for discussion of elective surgery    I have personally interviewed and examined this patient, reviewed pertinent labs and imaging, and discussed the case with colleagues, residents, and physician assistants on B Team rounds.    The active care issues are:  1. pericolonic abscess    The Acute Care Surgery (B Team) Attending Group Practice:  Dr. Archana Howard, Dr. Tariq Romero, Dr. Michael Preston, Dr. Tom Edouard,     urgent issues - spectra [phone]  nonurgent issues - [phone]  patient appointments or afterhours - [phone]
Carrol Almonte MD  Division of Endocrinology  Pager: [phone]    If after 6PM or before 9AM, or on weekends/holidays, please call endocrine answering service for assistance ([phone]).  For nonurgent matters email [email or web address] for assistance.
Carrol Almonte MD  Division of Endocrinology  Pager: [phone]    If after 6PM or before 9AM, or on weekends/holidays, please call endocrine answering service for assistance ([phone]).  For nonurgent matters email [email or web address] for assistance.
Carrol Almonte MD  Division of Endocrinology  Pager: [phone]    If after 6PM or before 9AM, or on weekends/holidays, please call endocrine answering service for assistance ([phone]).  For nonurgent matters email [email or web address] for assistance.
Carrol Almonte MD  Division of Endocrinology  Pager: [phone]    If after 6PM or before 9AM, or on weekends/holidays, please call endocrine answering service for assistance ([phone]).  For nonurgent matters email [email or web address] for assistance.

## 2020-05-08 NOTE — DISCHARGE NOTE PROVIDER - NSDCCPCAREPLAN_GEN_ALL_CORE_FT
PRINCIPAL DISCHARGE DIAGNOSIS  Diagnosis: Intra-abdominal abscess  Assessment and Plan of Treatment: drain care      SECONDARY DISCHARGE DIAGNOSES  Diagnosis: Diverticulitis  Assessment and Plan of Treatment:

## 2020-05-08 NOTE — DISCHARGE NOTE NURSING/CASE MANAGEMENT/SOCIAL WORK - NSDCPNPNATDISSUGG_GEN_ALL_CORE
No No/A&Ox4 with stable vital signs, afebrile, and pain controlled via ordered pain medications. Meg diet. Voids. LLQ RAVINDRA to ss- pt educated on how to empty drain, teachback method done. +flatus. OOB self. Safety and comfort maintained. Diabetes education provided, pt self administer insulin and teachback method done on how to obtain own blood sugar.

## 2020-05-08 NOTE — DISCHARGE NOTE PROVIDER - NSDCFUADDINST_GEN_ALL_CORE_FT
Please empty and record the drain output daily. Call Interventional Radiology to schedule a follow-up appointment in 1-2 weeks.   Please call the surgery office to schedule a follow-up appointment in 1-2 weeks.  Please call the endocrinology office to schedule a follow-up in 1-2 weeks. We also recommend following up with your primary care doctor for help managing your diabetes.     A prescription for Augmentin has been sent to your pharmacy. Please finish the entire prescription.    Please call the surgery office if you develop a fever greater than 101 or if your abdominal pain returns and you are unable to eat. Please empty and record the drain output daily. Call Interventional Radiology to schedule a follow-up appointment in 1-2 weeks.   Please call the surgery office to schedule a follow-up appointment in 1-2 weeks.  Please call the gastroenterology office to schedule a follow-up appointment in 1-2 weeks for evaluation and colonoscopy.  Please call the endocrinology office to schedule a follow-up in 1-2 weeks. We also recommend following up with your primary care doctor for help managing your diabetes.     A prescription for Augmentin has been sent to your pharmacy. Please finish the entire prescription.    Please call the surgery office if you develop a fever greater than 101 or if your abdominal pain returns and you are unable to eat.

## 2020-05-08 NOTE — PROGRESS NOTE ADULT - SUBJECTIVE AND OBJECTIVE BOX
Interval events: Repeat CT demonstrating near complete resolution of intraabdominal collection. Insulin regimen adjusted per Endocrinology. Advanced to regular diet, IV fluids discontinued.    S: Patient doing well, denies fevers, chills, nausea, emesis, chest pain, SOB. Tolerating regular diet, no acute complaints, abdominal pain significantly improved.    O: Vital Signs  T(C): 37 (05-07 @ 22:26), Max: 37 (05-07 @ 22:26)  HR: 91 (05-07 @ 22:26) (81 - 92)  BP: 127/73 (05-07 @ 22:26) (113/72 - 130/76)  RR: 17 (05-07 @ 22:26) (17 - 18)  SpO2: 99% (05-07 @ 22:26) (98% - 100%)  05-06-20 @ 07:01  -  05-07-20 @ 07:00  --------------------------------------------------------  IN: 1322 mL / OUT: 1431 mL / NET: -109 mL    05-07-20 @ 07:01  -  05-08-20 @ 00:14  --------------------------------------------------------  IN: 0 mL / OUT: 7.5 mL / NET: -7.5 mL      General: alert and oriented, NAD  Resp: airway patent, respirations unlabored  CVS: regular rate and rhythm  Abdomen: soft, nondistended, +TTP near drain site, IR drain in place with minimal serous output in bulb  Extremities: no edema  Skin: warm, dry, appropriate color                          10.7   11.74 )-----------( 583      ( 07 May 2020 05:51 )             33.2   05-07    138  |  104  |  6<L>  ----------------------------<  218<H>  3.7   |  23  |  0.47<L>    Ca    7.9<L>      07 May 2020 22:27  Phos  2.0     05-07  Mg     1.8     05-07

## 2020-05-09 LAB
CULTURE RESULTS: SIGNIFICANT CHANGE UP
ORGANISM # SPEC MICROSCOPIC CNT: SIGNIFICANT CHANGE UP
SPECIMEN SOURCE: SIGNIFICANT CHANGE UP

## 2020-05-13 PROBLEM — Z00.00 ENCOUNTER FOR PREVENTIVE HEALTH EXAMINATION: Status: ACTIVE | Noted: 2020-05-13

## 2020-05-15 ENCOUNTER — OUTPATIENT (OUTPATIENT)
Dept: OUTPATIENT SERVICES | Facility: HOSPITAL | Age: 55
LOS: 1 days | End: 2020-05-15
Payer: COMMERCIAL

## 2020-05-15 ENCOUNTER — APPOINTMENT (OUTPATIENT)
Dept: CT IMAGING | Facility: HOSPITAL | Age: 55
End: 2020-05-15

## 2020-05-15 ENCOUNTER — RESULT REVIEW (OUTPATIENT)
Age: 55
End: 2020-05-15

## 2020-05-15 DIAGNOSIS — K65.1 PERITONEAL ABSCESS: ICD-10-CM

## 2020-05-15 PROCEDURE — 76080 X-RAY EXAM OF FISTULA: CPT | Mod: 26

## 2020-05-15 PROCEDURE — 74150 CT ABDOMEN W/O CONTRAST: CPT | Mod: 26

## 2020-05-15 PROCEDURE — 49424 ASSESS CYST CONTRAST INJECT: CPT

## 2020-05-20 DIAGNOSIS — Z46.82 ENCOUNTER FOR FITTING AND ADJUSTMENT OF NON-VASCULAR CATHETER: ICD-10-CM

## 2020-05-20 DIAGNOSIS — K63.2 FISTULA OF INTESTINE: ICD-10-CM

## 2020-05-22 ENCOUNTER — RESULT REVIEW (OUTPATIENT)
Age: 55
End: 2020-05-22

## 2020-05-22 ENCOUNTER — APPOINTMENT (OUTPATIENT)
Dept: CT IMAGING | Facility: HOSPITAL | Age: 55
End: 2020-05-22
Payer: COMMERCIAL

## 2020-05-22 ENCOUNTER — OUTPATIENT (OUTPATIENT)
Dept: OUTPATIENT SERVICES | Facility: HOSPITAL | Age: 55
LOS: 1 days | End: 2020-05-22

## 2020-05-22 DIAGNOSIS — R18.8 OTHER ASCITES: ICD-10-CM

## 2020-05-22 PROCEDURE — 49424 ASSESS CYST CONTRAST INJECT: CPT

## 2020-05-22 PROCEDURE — 74150 CT ABDOMEN W/O CONTRAST: CPT | Mod: 26

## 2020-05-22 PROCEDURE — 76080 X-RAY EXAM OF FISTULA: CPT | Mod: 26

## 2020-05-28 DIAGNOSIS — Z46.82 ENCOUNTER FOR FITTING AND ADJUSTMENT OF NON-VASCULAR CATHETER: ICD-10-CM

## 2020-06-03 LAB
CULTURE RESULTS: SIGNIFICANT CHANGE UP
SPECIMEN SOURCE: SIGNIFICANT CHANGE UP

## 2020-06-04 ENCOUNTER — RESULT REVIEW (OUTPATIENT)
Age: 55
End: 2020-06-04

## 2020-06-04 ENCOUNTER — APPOINTMENT (OUTPATIENT)
Dept: CT IMAGING | Facility: HOSPITAL | Age: 55
End: 2020-06-04

## 2020-06-04 ENCOUNTER — OUTPATIENT (OUTPATIENT)
Dept: OUTPATIENT SERVICES | Facility: HOSPITAL | Age: 55
LOS: 1 days | End: 2020-06-04
Payer: COMMERCIAL

## 2020-06-04 DIAGNOSIS — R18.8 OTHER ASCITES: ICD-10-CM

## 2020-06-04 PROCEDURE — 76080 X-RAY EXAM OF FISTULA: CPT | Mod: 26

## 2020-06-04 PROCEDURE — 49424 ASSESS CYST CONTRAST INJECT: CPT

## 2020-06-04 PROCEDURE — 74150 CT ABDOMEN W/O CONTRAST: CPT | Mod: 26

## 2020-06-11 DIAGNOSIS — Z46.82 ENCOUNTER FOR FITTING AND ADJUSTMENT OF NON-VASCULAR CATHETER: ICD-10-CM

## 2020-07-29 ENCOUNTER — INPATIENT (INPATIENT)
Facility: HOSPITAL | Age: 55
LOS: 2 days | Discharge: HOME | End: 2020-08-01
Attending: STUDENT IN AN ORGANIZED HEALTH CARE EDUCATION/TRAINING PROGRAM | Admitting: STUDENT IN AN ORGANIZED HEALTH CARE EDUCATION/TRAINING PROGRAM
Payer: COMMERCIAL

## 2020-07-29 ENCOUNTER — EMERGENCY (EMERGENCY)
Facility: HOSPITAL | Age: 55
LOS: 1 days | Discharge: STILL ADMITTED OR ADMINISTRATIVE | End: 2020-07-29
Admitting: EMERGENCY MEDICINE

## 2020-07-29 ENCOUNTER — INPATIENT (INPATIENT)
Facility: HOSPITAL | Age: 55
LOS: 0 days | Discharge: HOME | End: 2020-07-29
Attending: PSYCHIATRY & NEUROLOGY | Admitting: PSYCHIATRY & NEUROLOGY

## 2020-07-29 VITALS
OXYGEN SATURATION: 100 % | TEMPERATURE: 98 F | HEART RATE: 78 BPM | SYSTOLIC BLOOD PRESSURE: 123 MMHG | DIASTOLIC BLOOD PRESSURE: 78 MMHG | RESPIRATION RATE: 16 BRPM

## 2020-07-29 VITALS
TEMPERATURE: 98 F | RESPIRATION RATE: 15 BRPM | OXYGEN SATURATION: 100 % | DIASTOLIC BLOOD PRESSURE: 81 MMHG | SYSTOLIC BLOOD PRESSURE: 143 MMHG | HEART RATE: 90 BPM

## 2020-07-29 DIAGNOSIS — F20.9 SCHIZOPHRENIA, UNSPECIFIED: ICD-10-CM

## 2020-07-29 DIAGNOSIS — K56.609 UNSPECIFIED INTESTINAL OBSTRUCTION, UNSPECIFIED AS TO PARTIAL VERSUS COMPLETE OBSTRUCTION: ICD-10-CM

## 2020-07-29 LAB
ALBUMIN SERPL ELPH-MCNC: 3.5 G/DL — SIGNIFICANT CHANGE UP (ref 3.3–5)
ALP SERPL-CCNC: 65 U/L — SIGNIFICANT CHANGE UP (ref 40–120)
ALT FLD-CCNC: 7 U/L — SIGNIFICANT CHANGE UP (ref 4–33)
ANION GAP SERPL CALC-SCNC: 12 MMO/L — SIGNIFICANT CHANGE UP (ref 7–14)
APPEARANCE UR: SIGNIFICANT CHANGE UP
APTT BLD: 25.6 SEC — LOW (ref 27–36.3)
AST SERPL-CCNC: 7 U/L — SIGNIFICANT CHANGE UP (ref 4–32)
BACTERIA # UR AUTO: HIGH
BASOPHILS # BLD AUTO: 0.04 K/UL — SIGNIFICANT CHANGE UP (ref 0–0.2)
BASOPHILS NFR BLD AUTO: 0.4 % — SIGNIFICANT CHANGE UP (ref 0–2)
BILIRUB SERPL-MCNC: 0.2 MG/DL — SIGNIFICANT CHANGE UP (ref 0.2–1.2)
BILIRUB UR-MCNC: NEGATIVE — SIGNIFICANT CHANGE UP
BLD GP AB SCN SERPL QL: NEGATIVE — SIGNIFICANT CHANGE UP
BLOOD UR QL VISUAL: NEGATIVE — SIGNIFICANT CHANGE UP
BUN SERPL-MCNC: 11 MG/DL — SIGNIFICANT CHANGE UP (ref 7–23)
CALCIUM SERPL-MCNC: 8 MG/DL — LOW (ref 8.4–10.5)
CEA SERPL-MCNC: 2 NG/ML — SIGNIFICANT CHANGE UP (ref 1–3.8)
CHLORIDE SERPL-SCNC: 97 MMOL/L — LOW (ref 98–107)
CO2 SERPL-SCNC: 21 MMOL/L — LOW (ref 22–31)
COLOR SPEC: YELLOW — SIGNIFICANT CHANGE UP
CREAT SERPL-MCNC: 0.46 MG/DL — LOW (ref 0.5–1.3)
EOSINOPHIL # BLD AUTO: 0.13 K/UL — SIGNIFICANT CHANGE UP (ref 0–0.5)
EOSINOPHIL NFR BLD AUTO: 1.2 % — SIGNIFICANT CHANGE UP (ref 0–6)
GLUCOSE BLDC GLUCOMTR-MCNC: 131 MG/DL — HIGH (ref 70–99)
GLUCOSE BLDC GLUCOMTR-MCNC: 156 MG/DL — HIGH (ref 70–99)
GLUCOSE SERPL-MCNC: 196 MG/DL — HIGH (ref 70–99)
GLUCOSE UR-MCNC: 200 — HIGH
HBA1C BLD-MCNC: 11.1 % — HIGH (ref 4–5.6)
HCT VFR BLD CALC: 35.2 % — SIGNIFICANT CHANGE UP (ref 34.5–45)
HCT VFR BLD CALC: 37.7 % — SIGNIFICANT CHANGE UP (ref 34.5–45)
HGB BLD-MCNC: 11.2 G/DL — LOW (ref 11.5–15.5)
HGB BLD-MCNC: 12.1 G/DL — SIGNIFICANT CHANGE UP (ref 11.5–15.5)
HYALINE CASTS # UR AUTO: HIGH
IMM GRANULOCYTES NFR BLD AUTO: 0.8 % — SIGNIFICANT CHANGE UP (ref 0–1.5)
INR BLD: 1.06 — SIGNIFICANT CHANGE UP (ref 0.88–1.17)
KETONES UR-MCNC: NEGATIVE — SIGNIFICANT CHANGE UP
LACTATE BLDV-MCNC: 1.5 MMOL/L — SIGNIFICANT CHANGE UP (ref 0.5–2)
LEUKOCYTE ESTERASE UR-ACNC: SIGNIFICANT CHANGE UP
LYMPHOCYTES # BLD AUTO: 1.99 K/UL — SIGNIFICANT CHANGE UP (ref 1–3.3)
LYMPHOCYTES # BLD AUTO: 17.9 % — SIGNIFICANT CHANGE UP (ref 13–44)
MCHC RBC-ENTMCNC: 25.2 PG — LOW (ref 27–34)
MCHC RBC-ENTMCNC: 25.2 PG — LOW (ref 27–34)
MCHC RBC-ENTMCNC: 31.8 % — LOW (ref 32–36)
MCHC RBC-ENTMCNC: 32.1 % — SIGNIFICANT CHANGE UP (ref 32–36)
MCV RBC AUTO: 78.5 FL — LOW (ref 80–100)
MCV RBC AUTO: 79.1 FL — LOW (ref 80–100)
MONOCYTES # BLD AUTO: 0.45 K/UL — SIGNIFICANT CHANGE UP (ref 0–0.9)
MONOCYTES NFR BLD AUTO: 4.1 % — SIGNIFICANT CHANGE UP (ref 2–14)
NEUTROPHILS # BLD AUTO: 8.4 K/UL — HIGH (ref 1.8–7.4)
NEUTROPHILS NFR BLD AUTO: 75.6 % — SIGNIFICANT CHANGE UP (ref 43–77)
NITRITE UR-MCNC: NEGATIVE — SIGNIFICANT CHANGE UP
NRBC # FLD: 0 K/UL — SIGNIFICANT CHANGE UP (ref 0–0)
NRBC # FLD: 0 K/UL — SIGNIFICANT CHANGE UP (ref 0–0)
PH UR: 5.5 — SIGNIFICANT CHANGE UP (ref 5–8)
PLATELET # BLD AUTO: 316 K/UL — SIGNIFICANT CHANGE UP (ref 150–400)
PLATELET # BLD AUTO: 419 K/UL — HIGH (ref 150–400)
PMV BLD: 10.1 FL — SIGNIFICANT CHANGE UP (ref 7–13)
PMV BLD: 10.6 FL — SIGNIFICANT CHANGE UP (ref 7–13)
POTASSIUM SERPL-MCNC: 3.6 MMOL/L — SIGNIFICANT CHANGE UP (ref 3.5–5.3)
POTASSIUM SERPL-SCNC: 3.6 MMOL/L — SIGNIFICANT CHANGE UP (ref 3.5–5.3)
PROT SERPL-MCNC: 6 G/DL — SIGNIFICANT CHANGE UP (ref 6–8.3)
PROT UR-MCNC: 30 — SIGNIFICANT CHANGE UP
PROTHROM AB SERPL-ACNC: 12.2 SEC — SIGNIFICANT CHANGE UP (ref 9.8–13.1)
RBC # BLD: 4.45 M/UL — SIGNIFICANT CHANGE UP (ref 3.8–5.2)
RBC # BLD: 4.8 M/UL — SIGNIFICANT CHANGE UP (ref 3.8–5.2)
RBC # FLD: 16.9 % — HIGH (ref 10.3–14.5)
RBC # FLD: 16.9 % — HIGH (ref 10.3–14.5)
RBC CASTS # UR COMP ASSIST: SIGNIFICANT CHANGE UP (ref 0–?)
RH IG SCN BLD-IMP: POSITIVE — SIGNIFICANT CHANGE UP
SARS-COV-2 RNA SPEC QL NAA+PROBE: SIGNIFICANT CHANGE UP
SODIUM SERPL-SCNC: 130 MMOL/L — LOW (ref 135–145)
SP GR SPEC: 1.03 — SIGNIFICANT CHANGE UP (ref 1–1.04)
SQUAMOUS # UR AUTO: SIGNIFICANT CHANGE UP
UROBILINOGEN FLD QL: NORMAL — SIGNIFICANT CHANGE UP
WBC # BLD: 11.1 K/UL — HIGH (ref 3.8–10.5)
WBC # BLD: 9.87 K/UL — SIGNIFICANT CHANGE UP (ref 3.8–10.5)
WBC # FLD AUTO: 11.1 K/UL — HIGH (ref 3.8–10.5)
WBC # FLD AUTO: 9.87 K/UL — SIGNIFICANT CHANGE UP (ref 3.8–10.5)
WBC UR QL: HIGH (ref 0–?)

## 2020-07-29 PROCEDURE — 74177 CT ABD & PELVIS W/CONTRAST: CPT | Mod: 26

## 2020-07-29 PROCEDURE — 99285 EMERGENCY DEPT VISIT HI MDM: CPT

## 2020-07-29 PROCEDURE — 99233 SBSQ HOSP IP/OBS HIGH 50: CPT | Mod: GC

## 2020-07-29 RX ORDER — DEXTROSE 50 % IN WATER 50 %
25 SYRINGE (ML) INTRAVENOUS ONCE
Refills: 0 | Status: DISCONTINUED | OUTPATIENT
Start: 2020-07-29 | End: 2020-08-01

## 2020-07-29 RX ORDER — ACETAMINOPHEN 500 MG
1000 TABLET ORAL ONCE
Refills: 0 | Status: COMPLETED | OUTPATIENT
Start: 2020-07-29 | End: 2020-07-29

## 2020-07-29 RX ORDER — GLUCAGON INJECTION, SOLUTION 0.5 MG/.1ML
1 INJECTION, SOLUTION SUBCUTANEOUS ONCE
Refills: 0 | Status: DISCONTINUED | OUTPATIENT
Start: 2020-07-29 | End: 2020-08-01

## 2020-07-29 RX ORDER — SODIUM CHLORIDE 9 MG/ML
1000 INJECTION INTRAMUSCULAR; INTRAVENOUS; SUBCUTANEOUS ONCE
Refills: 0 | Status: COMPLETED | OUTPATIENT
Start: 2020-07-29 | End: 2020-07-29

## 2020-07-29 RX ORDER — SODIUM CHLORIDE 9 MG/ML
1000 INJECTION, SOLUTION INTRAVENOUS
Refills: 0 | Status: DISCONTINUED | OUTPATIENT
Start: 2020-07-29 | End: 2020-07-31

## 2020-07-29 RX ORDER — ACETAMINOPHEN 500 MG
975 TABLET ORAL ONCE
Refills: 0 | Status: COMPLETED | OUTPATIENT
Start: 2020-07-29 | End: 2020-07-29

## 2020-07-29 RX ORDER — PIPERACILLIN AND TAZOBACTAM 4; .5 G/20ML; G/20ML
3.38 INJECTION, POWDER, LYOPHILIZED, FOR SOLUTION INTRAVENOUS EVERY 8 HOURS
Refills: 0 | Status: DISCONTINUED | OUTPATIENT
Start: 2020-07-29 | End: 2020-08-01

## 2020-07-29 RX ORDER — ACETAMINOPHEN 500 MG
1000 TABLET ORAL ONCE
Refills: 0 | Status: COMPLETED | OUTPATIENT
Start: 2020-07-29 | End: 2020-07-30

## 2020-07-29 RX ORDER — ENOXAPARIN SODIUM 100 MG/ML
40 INJECTION SUBCUTANEOUS DAILY
Refills: 0 | Status: DISCONTINUED | OUTPATIENT
Start: 2020-07-29 | End: 2020-08-01

## 2020-07-29 RX ORDER — INSULIN LISPRO 100/ML
VIAL (ML) SUBCUTANEOUS EVERY 6 HOURS
Refills: 0 | Status: DISCONTINUED | OUTPATIENT
Start: 2020-07-29 | End: 2020-07-31

## 2020-07-29 RX ORDER — MORPHINE SULFATE 50 MG/1
4 CAPSULE, EXTENDED RELEASE ORAL ONCE
Refills: 0 | Status: DISCONTINUED | OUTPATIENT
Start: 2020-07-29 | End: 2020-07-29

## 2020-07-29 RX ORDER — DEXTROSE 50 % IN WATER 50 %
15 SYRINGE (ML) INTRAVENOUS ONCE
Refills: 0 | Status: DISCONTINUED | OUTPATIENT
Start: 2020-07-29 | End: 2020-08-01

## 2020-07-29 RX ORDER — DEXTROSE 50 % IN WATER 50 %
12.5 SYRINGE (ML) INTRAVENOUS ONCE
Refills: 0 | Status: DISCONTINUED | OUTPATIENT
Start: 2020-07-29 | End: 2020-08-01

## 2020-07-29 RX ORDER — PIPERACILLIN AND TAZOBACTAM 4; .5 G/20ML; G/20ML
3.38 INJECTION, POWDER, LYOPHILIZED, FOR SOLUTION INTRAVENOUS ONCE
Refills: 0 | Status: COMPLETED | OUTPATIENT
Start: 2020-07-29 | End: 2020-07-29

## 2020-07-29 RX ORDER — SODIUM CHLORIDE 9 MG/ML
1000 INJECTION, SOLUTION INTRAVENOUS
Refills: 0 | Status: DISCONTINUED | OUTPATIENT
Start: 2020-07-29 | End: 2020-08-01

## 2020-07-29 RX ADMIN — Medication 975 MILLIGRAM(S): at 10:02

## 2020-07-29 RX ADMIN — SODIUM CHLORIDE 2000 MILLILITER(S): 9 INJECTION INTRAMUSCULAR; INTRAVENOUS; SUBCUTANEOUS at 10:02

## 2020-07-29 RX ADMIN — SODIUM CHLORIDE 100 MILLILITER(S): 9 INJECTION, SOLUTION INTRAVENOUS at 19:04

## 2020-07-29 RX ADMIN — Medication 1000 MILLIGRAM(S): at 17:35

## 2020-07-29 RX ADMIN — Medication 400 MILLIGRAM(S): at 17:05

## 2020-07-29 RX ADMIN — SODIUM CHLORIDE 1000 MILLILITER(S): 9 INJECTION INTRAMUSCULAR; INTRAVENOUS; SUBCUTANEOUS at 14:57

## 2020-07-29 RX ADMIN — PIPERACILLIN AND TAZOBACTAM 200 GRAM(S): 4; .5 INJECTION, POWDER, LYOPHILIZED, FOR SOLUTION INTRAVENOUS at 17:29

## 2020-07-29 NOTE — ED ADULT NURSE REASSESSMENT NOTE - NS ED NURSE REASSESS COMMENT FT1
CT results show large bowel obstruction.  Seen by surgery.  NS bolus given per orders.  c/o generalized abdominal pains/ 7/10.  Refused morephine.  States it makes her abdomin feel worse.  PA made aware.  Report given to [facility] nurse.
Received pt to rw. IVF infusing into left hand. Noted pts A1C 11.1. Initiated diabetic teaching, living with diabetes type 2, meal planning with exchanges education sheets given to pt using teach back method. Pt has understanding re various food groups, portion sizes, teaching re fs  pre meals and hs and recording above on paper to show to PMD upon visit. Reinforced importance of following thru with PMD re diabetic control. Explained to pt to read thru paperwork that was reviewed and to ask any ques to staff.  Pt aware she is currently awaiting CT scan of abdomen
Reinforced npo status. Explained to tp that as per ct scan she has an intesteinal blockage that doesn't allow the food to go down and out that's why she has a poor appetite and abdominal pain. As per MD pt is waiting for surgical consult. This was also explained to pt. Covid swab done. Continue to monitor pr for any changes

## 2020-07-29 NOTE — ED ADULT TRIAGE NOTE - CHIEF COMPLAINT QUOTE
55 y/o F with PMHx of perforated diverticulitis with abscess in May with a drainage presents to ED with intermittent and “crampy” abdominal pain

## 2020-07-29 NOTE — H&P ADULT - NSHPREVIEWOFSYSTEMS_GEN_ALL_CORE
REVIEW OF SYSTEMS:  CONSTITUTIONAL: No weakness, fevers or chills  EYES/ENT: No visual changes;  No vertigo or throat pain   NECK: No pain or stiffness  RESPIRATORY: No cough, wheezing, hemoptysis; No shortness of breath  CARDIOVASCULAR: No chest pain or palpitations  GASTROINTESTINAL: as per HPI  GENITOURINARY: No dysuria, frequency or hematuria  NEUROLOGICAL: No numbness or weakness  SKIN: No itching, rashes

## 2020-07-29 NOTE — H&P ADULT - ASSESSMENT
ASSESSMENT:   54F PMH DM, prior episode of presumed perforated diverticulitis treated with IV abx and drainage, now with evidence of LBO with focal thickening at descending colon, possibly 2/2 malignancy.     PLAN:   - NPO / IV fluids   - NGT if patient vomits  - GI consult to determine possibility of colonoscopic decompression with stent   - Serial abdominal exams   - DVT ppx    Please contact [facility], Surgery (p. [phone]) with any questions.     Nelly Villegas, PGY2  Vascular Surgery, A Team  Pager [phone] ASSESSMENT:   54F PMH DM, prior episode of presumed perforated diverticulitis treated with IV abx and drainage, now with evidence of LBO with focal thickening at descending colon, possibly 2/2 malignancy.     PLAN:   - Admit to Dr. Jony Barbour, [facility] Team Surgery   - NPO / IV fluids   - NGT if patient vomits  - GI consulted to determine possibility of colonoscopic decompression with stent - Dr. Lanza   - Serial abdominal exams   - Trend labs   - DVT ppx    Please contact A [facility], Surgery (p. [phone]) with any questions.     Nelly Villegas, PGY2  Vascular Surgery, A Team  Pager [phone] ASSESSMENT:   54F PMH DM, prior episode of presumed perforated diverticulitis treated with IV abx and drainage, now with evidence of LBO with focal thickening at descending colon, possibly 2/2 malignancy.     PLAN:   - Admit to Dr. Jony Barbour, A Team Surgery   - NPO / IV fluids   - NGT if patient vomits  - GI consulted to determine possibility of colonoscopic decompression with stent - Dr. Lanza   - IV abx: Zosyn  - Serial abdominal exams   - Pain control only after abdominal exam  - Trend labs , F/U lactate  - DVT ppx    Please contact A [facility], Surgery (p. [phone]) with any questions.     Nelly Villegas, PGY2  Vascular Surgery, A Team  Pager [phone]

## 2020-07-29 NOTE — ED PROVIDER NOTE - CLINICAL SUMMARY MEDICAL DECISION MAKING FREE TEXT BOX
55 y/o F with abdominal pain for 3 days presents to ED. Will check electrolytes, CBC, CT-abdomen, and CT-pelvis to rule out perforated diverticulitis and pelvic inflammation.

## 2020-07-29 NOTE — ED ADULT NURSE REASSESSMENT NOTE - NS ED NURSE REASSESS COMMENT FT1
A&OX4.  Generalized abdominal pains 7/10 and medicated per MD orders.  PIV commenced via left hand with #20angiocath and NS bolus commenced.  Labs sent to lab.  Awaiting UA/UCG.   Will continue to monitor.

## 2020-07-29 NOTE — CONSULT NOTE ADULT - ASSESSMENT
Impression:  # Abdominal Pain: CT Findings report possible LBO, but clinically does not appear to have complete obstruction. Suspect obstruction related to inflammation from recent diverticulitis and possible scarring. Differential includes colon cancer  # DM2    Recommendation:  - NPO  - Supportive care per primary team    Kira Otoole MD  Gastroenterology Fellow  [phone]  Available on Microsoft Teams  Please page on call fellow on weekends and after 5pm on weekdays: [phone] Impression:  # Abdominal Pain: CT Findings report possible LBO, but clinically does not appear to have complete obstruction. Suspect obstruction related to inflammation from recent diverticulitis and possible scarring. Differential includes colon cancer  # DM2    Recommendation:  - NPO  - Will plan for Colonoscopy tomorrow  - Will order enemas in the AM  - Supportive care per primary team    Kira Otoole MD  Gastroenterology Fellow  [phone]  [phone]  Available on Microsoft Teams  Please page on call fellow on weekends and after 5pm on weekdays: [phone]

## 2020-07-29 NOTE — ED PROVIDER NOTE - NS_ ATTENDINGSCRIBEDETAILS _ED_A_ED_FT
The scribe's documentation has been prepared under my direction and personally reviewed by me in its entirety. I confirm that the note above accurately reflects all work, treatment, procedures, and medical decision making performed by me (Dr. Whittaker).

## 2020-07-29 NOTE — CONSULT NOTE ADULT - SUBJECTIVE AND OBJECTIVE BOX
Chief Complaint:  Patient is a 54y old  Female who presents with a chief complaint of Large bowel obstruction (2020 15:04)    HPI:  54 year old female with hx of DM, recent admission for abdominal pain in the setting of pericolonic abscess suspected secondary to perforated diverticulitis s/p IR drain presents with 3 days of abdominal pain. She reports after her most recent discharge she was feeling well up until 3 days ago when she developed waxing and waning right sided abdominal pain associated with increased belching. She denies fevers, chills, chest pain, shortness of breath, nausea, vomiting, diarrhea melena, hematochezia, hematemesis and cough. Her last bowel movement was 4 days ago. She reports passing gas, but it is significantly decreased from her baseline. She has never had a colonoscopy.     Allergies:  No Known Allergies    Home Medications:  Reviewed    Hospital Medications:  dextrose 40% Gel 15 Gram(s) Oral once PRN  dextrose 5%. 1000 milliLiter(s) IV Continuous <Continuous>  dextrose 50% Injectable 12.5 Gram(s) IV Push once  dextrose 50% Injectable 25 Gram(s) IV Push once  dextrose 50% Injectable 25 Gram(s) IV Push once  enoxaparin Injectable 40 milliGRAM(s) SubCutaneous daily  glucagon  Injectable 1 milliGRAM(s) IntraMuscular once PRN  insulin lispro (HumaLOG) corrective regimen sliding scale   SubCutaneous every 6 hours  lactated ringers. 1000 milliLiter(s) IV Continuous <Continuous>    PMHX/PSHX:  Perforated diverticulum  Uncontrolled type 2 diabetes mellitus with complication  No pertinent past medical history  No significant past surgical history    Family history:  No pertinent family history in first degree relatives    Social History:   Denies tobacco use, EtOH use or illicit drug use     ROS:   General:  No fevers, chills  ENT:  No sore throat or dysphagia  CV:  No pain or palpitations  Resp:  No dyspnea, cough, wheezing  GI:  No pain, No nausea, No vomiting,  No pruritis, No rectal bleeding, No tarry stools  Skin:  No rash or edema      PHYSICAL EXAM:   GENERAL:  NAD, Appears stated age  HEENT:  NC/AT,  conjunctivae clear and pink, sclera -anicteric  CHEST:  CTA B/L, Normal effort  HEART:  RRR S1/S2,  ABDOMEN:  Soft, non-tender, mild distended, BS+  EXTEREMITIES:  No cyanosis  SKIN:  Warm & Dry.  NEURO:  Alert, oriented    Vital Signs:  Vital Signs Last 24 Hrs  T(C): 36.9 (2020 11:46), Max: 36.9 (2020 11:46)  T(F): 98.5 (2020 11:46), Max: 98.5 (2020 11:46)  HR: 78 (2020 11:46) (78 - 90)  BP: 123/78 (2020 11:46) (123/78 - 143/81)  BP(mean): --  RR: 16 (2020 11:46) (15 - 16)  SpO2: 100% (2020 11:46) (100% - 100%)  Daily     Daily     LABS:                        11.2   11.10 )-----------( 316      ( 2020 12:32 )             35.2     Mean Cell Volume: 79.1 fL (- @ 12:32)    07    130<L>  |  97<L>  |  11  ----------------------------<  196<H>  3.6   |  21<L>  |  0.46<L>    Ca    8.0<L>      2020 12:32    TPro  6.0  /  Alb  3.5  /  TBili  0.2  /  DBili  x   /  AST  7   /  ALT  7   /  AlkPhos  65  07-29    LIVER FUNCTIONS - ( 2020 12:32 )  Alb: 3.5 g/dL / Pro: 6.0 g/dL / ALK PHOS: 65 u/L / ALT: 7 u/L / AST: 7 u/L / GGT: x           PT/INR - ( 2020 10:00 )   PT: 12.2 SEC;   INR: 1.06          PTT - ( 2020 10:00 )  PTT:25.6 SEC  Urinalysis Basic - ( 2020 11:50 )    Color: YELLOW / Appearance: Lt TURBID / S.031 / pH: 5.5  Gluc: 200 / Ketone: NEGATIVE  / Bili: NEGATIVE / Urobili: NORMAL   Blood: NEGATIVE / Protein: 30 / Nitrite: NEGATIVE   Leuk Esterase: SMALL / RBC: 3-5 / WBC 26-50   Sq Epi: MODERATE / Non Sq Epi: x / Bacteria: MANY      Amylase Serum--      Lipase serum20.9       Ammonia--                          11.2   11.10 )-----------( 316      ( 2020 12:32 )             35.2                         11.9   11.67 )-----------( 400      ( 2020 10:00 )             36.8     Imaging:

## 2020-07-29 NOTE — H&P ADULT - HISTORY OF PRESENT ILLNESS
HPI: 54F PMH DM, presenting with 3 days abdominal pain, no BM/flatus. Prior admission in May for ?perf diverticulitis treated with IR drainage and IV abx. Never had colonoscopy in past. No n/v. No fevers/chills/CP/SOB. HPI: 54F PMH DM, prior admission for presumed perforated diverticulitis with associated abscess (s/p IR drain) presenting with 3 days abdominal pain. Pt states pain began on Monday AM, described as cramping, waxing adn waning in severity but never went away. No BM/flatus since onset of pain. No associated nausea or vomiting. No fevers/chills/CP/SOB. Pt never had a colonoscopy in the past. Of note, pt had recent admission (admitted May3) for abd pain and CT scan concerning for perforated diverticulitis v. malignancy w associated abscess, that was managed with IV antibiotics and IR drainage, with interval CT revealing resolution of collection and drain was subsequently removed one month after discharge. Pt never followed up for colonoscopy.

## 2020-07-29 NOTE — ED PROVIDER NOTE - OBJECTIVE STATEMENT
55 y/o F with PMHx of perforated diverticulitis with abscess in May with a drainage presents to ED with intermittent and “crampy” abdominal pain. Patient is experiencing a decrease of PO intake and appetite. The patient is belching and decreased flatulence, but her last bowel movement was 3 days ago. The patient reports that her current symptoms are similar to her symptoms of perforated diverticulitis but not as intense. The patient also has a history of diabetes and takes metformin, but has not taken insulin since she left the hospital (even though she was told that she should be). Patient denies fever and any other surgeries other than her abscess drainage in May.

## 2020-07-29 NOTE — ED PROVIDER NOTE - OBJECTIVE STATEMENT
53 y/o F with PMHx of perforated diverticulitis with abscess in May with a drainage presents to ED with intermittent and “crampy” abdominal pain. Patient is experiencing a decrease of PO intake and appetite. The patient is belching and decreased flatulence, but her last bowel movement was 3 days ago. The patient reports that her current symptoms are similar to her symptoms of perforated diverticulitis but not as intense. The patient also has a history of diabetes and takes metformin, but has not taken insulin since she left the hospital (even though she was told that she should be). Patient denies fever and any other surgeries other than her abscess drainage in May.

## 2020-07-29 NOTE — H&P ADULT - NSHPLABSRESULTS_GEN_ALL_CORE
LABS:                        11.2   11.10 )-----------( 316      ( 29 Jul 2020 12:32 )             35.2     07-29    130<L>  |  97<L>  |  11  ----------------------------<  196<H>  3.6   |  21<L>  |  0.46<L>    Ca    8.0<L>      29 Jul 2020 12:32    TPro  6.0  /  Alb  3.5  /  TBili  0.2  /  DBili  x   /  AST  7   /  ALT  7   /  AlkPhos  65  07-29    PT/INR - ( 29 Jul 2020 10:00 )   PT: 12.2 SEC;   INR: 1.06          PTT - ( 29 Jul 2020 10:00 )  PTT:25.6 SEC    RADIOLOGY     CT Abdomen and Pelvis w/ IV Cont  IMPRESSION:  Large bowel obstruction at  stenotic site mid descending colon corresponding to location of prior contained perforation and abscess

## 2020-07-29 NOTE — ED PROVIDER NOTE - BIRTH SEX
Maru from [facility] has questions about the chart notes that were sent regarding the back brace. Maru is requesting a call back.    Female

## 2020-07-29 NOTE — H&P ADULT - NSICDXPASTSURGICALHX_GEN_ALL_CORE_FT
Please inform patient to continue with current dose of levothyroxine PAST SURGICAL HISTORY:  No significant past surgical history

## 2020-07-29 NOTE — H&P ADULT - NSICDXPASTMEDICALHX_GEN_ALL_CORE_FT
PAST MEDICAL HISTORY:  Perforated diverticulum     Uncontrolled type 2 diabetes mellitus with complication

## 2020-07-29 NOTE — H&P ADULT - NSHPPHYSICALEXAM_GEN_ALL_CORE
Vital Signs Last 24 Hrs  T(C): 36.9 (29 Jul 2020 11:46), Max: 36.9 (29 Jul 2020 11:46)  T(F): 98.5 (29 Jul 2020 11:46), Max: 98.5 (29 Jul 2020 11:46)  HR: 78 (29 Jul 2020 11:46) (78 - 90)  BP: 123/78 (29 Jul 2020 11:46) (123/78 - 143/81)  BP(mean): --  RR: 16 (29 Jul 2020 11:46) (15 - 16)  SpO2: 100% (29 Jul 2020 11:46) (100% - 100%)    PHYSICAL EXAM  HPI:  GEN: No acute distress, resting comfortably   HEENT: NCAT   NEURO: no gross deficits   CV: RRR  RESP: No increased work of breathing   ABD: soft, nontender, minimal distension, no rebound or guarding   EXT: warm and well perfused

## 2020-07-30 ENCOUNTER — RESULT REVIEW (OUTPATIENT)
Age: 55
End: 2020-07-30

## 2020-07-30 LAB
ANION GAP SERPL CALC-SCNC: 12 MMO/L — SIGNIFICANT CHANGE UP (ref 7–14)
ANION GAP SERPL CALC-SCNC: 14 MMO/L — SIGNIFICANT CHANGE UP (ref 7–14)
APTT BLD: 32.4 SEC — SIGNIFICANT CHANGE UP (ref 27–36.3)
BUN SERPL-MCNC: 6 MG/DL — LOW (ref 7–23)
BUN SERPL-MCNC: 7 MG/DL — SIGNIFICANT CHANGE UP (ref 7–23)
CALCIUM SERPL-MCNC: 8.3 MG/DL — LOW (ref 8.4–10.5)
CALCIUM SERPL-MCNC: 8.4 MG/DL — SIGNIFICANT CHANGE UP (ref 8.4–10.5)
CHLORIDE SERPL-SCNC: 100 MMOL/L — SIGNIFICANT CHANGE UP (ref 98–107)
CHLORIDE SERPL-SCNC: 101 MMOL/L — SIGNIFICANT CHANGE UP (ref 98–107)
CO2 SERPL-SCNC: 21 MMOL/L — LOW (ref 22–31)
CO2 SERPL-SCNC: 21 MMOL/L — LOW (ref 22–31)
CREAT SERPL-MCNC: 0.44 MG/DL — LOW (ref 0.5–1.3)
CREAT SERPL-MCNC: 0.52 MG/DL — SIGNIFICANT CHANGE UP (ref 0.5–1.3)
GLUCOSE BLDC GLUCOMTR-MCNC: 124 MG/DL — HIGH (ref 70–99)
GLUCOSE BLDC GLUCOMTR-MCNC: 146 MG/DL — HIGH (ref 70–99)
GLUCOSE BLDC GLUCOMTR-MCNC: 164 MG/DL — HIGH (ref 70–99)
GLUCOSE BLDC GLUCOMTR-MCNC: 192 MG/DL — HIGH (ref 70–99)
GLUCOSE BLDC GLUCOMTR-MCNC: 192 MG/DL — HIGH (ref 70–99)
GLUCOSE SERPL-MCNC: 144 MG/DL — HIGH (ref 70–99)
GLUCOSE SERPL-MCNC: 170 MG/DL — HIGH (ref 70–99)
HCT VFR BLD CALC: 39 % — SIGNIFICANT CHANGE UP (ref 34.5–45)
HGB BLD-MCNC: 11.8 G/DL — SIGNIFICANT CHANGE UP (ref 11.5–15.5)
INR BLD: 1.13 — SIGNIFICANT CHANGE UP (ref 0.88–1.17)
LACTATE SERPL-SCNC: 1 MMOL/L — SIGNIFICANT CHANGE UP (ref 0.5–2)
MAGNESIUM SERPL-MCNC: 1.6 MG/DL — SIGNIFICANT CHANGE UP (ref 1.6–2.6)
MCHC RBC-ENTMCNC: 24.2 PG — LOW (ref 27–34)
MCHC RBC-ENTMCNC: 30.3 % — LOW (ref 32–36)
MCV RBC AUTO: 80.1 FL — SIGNIFICANT CHANGE UP (ref 80–100)
NRBC # FLD: 0 K/UL — SIGNIFICANT CHANGE UP (ref 0–0)
PHOSPHATE SERPL-MCNC: 3.9 MG/DL — SIGNIFICANT CHANGE UP (ref 2.5–4.5)
PLATELET # BLD AUTO: 417 K/UL — HIGH (ref 150–400)
PMV BLD: 9.9 FL — SIGNIFICANT CHANGE UP (ref 7–13)
POTASSIUM SERPL-MCNC: 3 MMOL/L — LOW (ref 3.5–5.3)
POTASSIUM SERPL-MCNC: 3.6 MMOL/L — SIGNIFICANT CHANGE UP (ref 3.5–5.3)
POTASSIUM SERPL-SCNC: 3 MMOL/L — LOW (ref 3.5–5.3)
POTASSIUM SERPL-SCNC: 3.6 MMOL/L — SIGNIFICANT CHANGE UP (ref 3.5–5.3)
PROTHROM AB SERPL-ACNC: 12.9 SEC — SIGNIFICANT CHANGE UP (ref 9.8–13.1)
RBC # BLD: 4.87 M/UL — SIGNIFICANT CHANGE UP (ref 3.8–5.2)
RBC # FLD: 16.8 % — HIGH (ref 10.3–14.5)
SARS-COV-2 IGG SERPL QL IA: POSITIVE
SARS-COV-2 IGM SERPL IA-ACNC: 4.15 INDEX — HIGH
SODIUM SERPL-SCNC: 134 MMOL/L — LOW (ref 135–145)
SODIUM SERPL-SCNC: 135 MMOL/L — SIGNIFICANT CHANGE UP (ref 135–145)
WBC # BLD: 8.59 K/UL — SIGNIFICANT CHANGE UP (ref 3.8–10.5)
WBC # FLD AUTO: 8.59 K/UL — SIGNIFICANT CHANGE UP (ref 3.8–10.5)

## 2020-07-30 PROCEDURE — 76000 FLUOROSCOPY <1 HR PHYS/QHP: CPT | Mod: 26,GC,59

## 2020-07-30 PROCEDURE — 88305 TISSUE EXAM BY PATHOLOGIST: CPT | Mod: 26

## 2020-07-30 PROCEDURE — 45389 COLONOSCOPY W/STENT PLCMT: CPT | Mod: GC

## 2020-07-30 PROCEDURE — 45380 COLONOSCOPY AND BIOPSY: CPT | Mod: GC,59

## 2020-07-30 PROCEDURE — 74018 RADEX ABDOMEN 1 VIEW: CPT | Mod: 26

## 2020-07-30 RX ORDER — MAGNESIUM SULFATE 500 MG/ML
2 VIAL (ML) INJECTION ONCE
Refills: 0 | Status: COMPLETED | OUTPATIENT
Start: 2020-07-30 | End: 2020-07-30

## 2020-07-30 RX ORDER — ACETAMINOPHEN 500 MG
1000 TABLET ORAL ONCE
Refills: 0 | Status: DISCONTINUED | OUTPATIENT
Start: 2020-07-30 | End: 2020-08-01

## 2020-07-30 RX ORDER — POTASSIUM CHLORIDE 20 MEQ
40 PACKET (EA) ORAL ONCE
Refills: 0 | Status: COMPLETED | OUTPATIENT
Start: 2020-07-30 | End: 2020-07-30

## 2020-07-30 RX ORDER — POTASSIUM CHLORIDE 20 MEQ
10 PACKET (EA) ORAL
Refills: 0 | Status: DISCONTINUED | OUTPATIENT
Start: 2020-07-30 | End: 2020-07-30

## 2020-07-30 RX ORDER — POTASSIUM CHLORIDE 20 MEQ
10 PACKET (EA) ORAL
Refills: 0 | Status: COMPLETED | OUTPATIENT
Start: 2020-07-30 | End: 2020-07-30

## 2020-07-30 RX ADMIN — Medication 40 MILLIEQUIVALENT(S): at 18:29

## 2020-07-30 RX ADMIN — Medication 50 GRAM(S): at 18:29

## 2020-07-30 RX ADMIN — Medication 100 MILLIEQUIVALENT(S): at 04:13

## 2020-07-30 RX ADMIN — PIPERACILLIN AND TAZOBACTAM 25 GRAM(S): 4; .5 INJECTION, POWDER, LYOPHILIZED, FOR SOLUTION INTRAVENOUS at 23:38

## 2020-07-30 RX ADMIN — Medication 1: at 06:45

## 2020-07-30 RX ADMIN — Medication 1 TABLET(S): at 23:38

## 2020-07-30 RX ADMIN — Medication 100 MILLIEQUIVALENT(S): at 06:07

## 2020-07-30 RX ADMIN — Medication 1: at 13:14

## 2020-07-30 RX ADMIN — PIPERACILLIN AND TAZOBACTAM 25 GRAM(S): 4; .5 INJECTION, POWDER, LYOPHILIZED, FOR SOLUTION INTRAVENOUS at 01:06

## 2020-07-30 RX ADMIN — ENOXAPARIN SODIUM 40 MILLIGRAM(S): 100 INJECTION SUBCUTANEOUS at 13:14

## 2020-07-30 RX ADMIN — Medication 1000 MILLIGRAM(S): at 01:36

## 2020-07-30 RX ADMIN — Medication 1: at 18:29

## 2020-07-30 RX ADMIN — Medication 100 MILLIEQUIVALENT(S): at 03:01

## 2020-07-30 RX ADMIN — Medication 100 MILLIEQUIVALENT(S): at 13:15

## 2020-07-30 RX ADMIN — PIPERACILLIN AND TAZOBACTAM 25 GRAM(S): 4; .5 INJECTION, POWDER, LYOPHILIZED, FOR SOLUTION INTRAVENOUS at 13:14

## 2020-07-30 RX ADMIN — Medication 400 MILLIGRAM(S): at 01:06

## 2020-07-30 RX ADMIN — SODIUM CHLORIDE 100 MILLILITER(S): 9 INJECTION, SOLUTION INTRAVENOUS at 13:16

## 2020-07-30 NOTE — CHART NOTE - NSCHARTNOTEFT_GEN_A_CORE
Patient was seen on floor. Endorses sharp pain that comes and goes.  On exam, her abdomen was soft, distended, and tender to palpation in RUQ/RLQ and epigastric region.    ICU Vital Signs Last 24 Hrs  T(C): 36.6 (29 Jul 2020 21:23), Max: 36.9 (29 Jul 2020 11:46)  T(F): 97.8 (29 Jul 2020 21:23), Max: 98.5 (29 Jul 2020 11:46)  HR: 70 (29 Jul 2020 21:23) (18 - 90)  BP: 98/57 (29 Jul 2020 21:23) (98/57 - 143/81)  RR: 17 (29 Jul 2020 21:23) (15 - 18)  SpO2: 100% (29 Jul 2020 21:23) (100% - 100%)    We ordered stat labs including CBC, BMP, coags, T&S, lactate.  She was given a one time dose of IV tylenol.      We will continue to assess her.           Cheli Acosta, General Surgery PGY-1 22:00  Patient was seen on floor. Endorses sharp pain that comes and goes.  On exam, her abdomen was soft, moderately distended, and mildly tender to palpation in RUQ/RLQ. She did not exhibit signs of acute abdomen/peritonitis.     ICU Vital Signs Last 24 Hrs  T(C): 36.9 (30 Jul 2020 01:16), Max: 36.9 (29 Jul 2020 11:46)  T(F): 98.5 (30 Jul 2020 01:16), Max: 98.5 (29 Jul 2020 11:46)  HR: 84 (30 Jul 2020 01:16) (18 - 90)  BP: 134/66 (30 Jul 2020 01:16) (98/57 - 143/81)  RR: 17 (30 Jul 2020 01:16) (15 - 18)  SpO2: 100% (30 Jul 2020 01:16) (100% - 100%)      We ordered stat labs including CBC, BMP, coags, T&S, lactate.  She was given a one time dose of IV tylenol.      We will continue to assess her.       00:00  Patient seen along with senior resident  Labs were completed and reviewed.  Abdominal x-ray completed- will review with radiology    LABS:                        12.1   9.87  )-----------( 419      ( 29 Jul 2020 23:40 )             37.7     29 Jul 2020 23:40    135    |  100    |  7      ----------------------------<  144    3.0     |  21     |  0.44     Ca    8.4        29 Jul 2020 23:40    Lactate: 1.0    PT/INR - ( 29 Jul 2020 23:40 )   PT: 12.9 SEC;   INR: 1.13     PTT - ( 29 Jul 2020 23:40 )  PTT:32.4 SEC      Plan for scope with GI tomorrow.  Will continue to monitor with repeat abdominal exams and vitals overnight.     Cheli Acosta, General Surgery PGY-1

## 2020-07-30 NOTE — PROGRESS NOTE ADULT - SUBJECTIVE AND OBJECTIVE BOX
Surgery AM Progress Note    S: Pt seen and examined at bedside. Pt sates she had mild abdominal pain overnight. Patient doing well otherwise, tolerating regular diet, no acute complaints. Pt denies N/V/D/C, chest pain, SOB, fevers, or chills.    Meds  Neurologic Medications    Respiratory Medications    Cardiovascular Medications    Gastrointestinal Medications  dextrose 5%. 1000 milliLiter(s) IV Continuous <Continuous>  lactated ringers. 1000 milliLiter(s) IV Continuous <Continuous>    Genitourinary Medications    Hematologic/Oncologic Medications  enoxaparin Injectable 40 milliGRAM(s) SubCutaneous daily    Antimicrobial/Immunologic Medications  piperacillin/tazobactam IVPB.. 3.375 Gram(s) IV Intermittent every 8 hours    Endocrine/Metabolic Medications  dextrose 40% Gel 15 Gram(s) Oral once PRN Blood Glucose LESS THAN 70 milliGRAM(s)/deciliter  dextrose 50% Injectable 12.5 Gram(s) IV Push once  dextrose 50% Injectable 25 Gram(s) IV Push once  dextrose 50% Injectable 25 Gram(s) IV Push once  glucagon  Injectable 1 milliGRAM(s) IntraMuscular once PRN Glucose LESS THAN 70 milligrams/deciliter  insulin lispro (HumaLOG) corrective regimen sliding scale   SubCutaneous every 6 hours    Topical/Other Medications    Vitals  T(C): 37.1 (07-30-20 @ 05:01), Max: 37.1 (07-30-20 @ 05:01)  HR: 78 (07-30-20 @ 05:01) (18 - 90)  BP: 128/76 (07-30-20 @ 05:01) (98/57 - 143/81)  BP(mean): --  ABP: --  ABP(mean): --  RR: 16 (07-30-20 @ 05:01) (15 - 18)  SpO2: 100% (07-30-20 @ 05:01) (100% - 100%)  Wt(kg): --  CVP(mm Hg): --  CI: --  CAPILLARY BLOOD GLUCOSE      POCT Blood Glucose.: 164 mg/dL (30 Jul 2020 06:28)  POCT Blood Glucose.: 146 mg/dL (30 Jul 2020 01:02)  POCT Blood Glucose.: 131 mg/dL (29 Jul 2020 20:50)  POCT Blood Glucose.: 156 mg/dL (29 Jul 2020 16:19)  POCT Blood Glucose.: 246 mg/dL (29 Jul 2020 09:03)   N/A      07-29 @ 07:01  -  07-30 @ 07:00  --------------------------------------------------------  IN:    IV PiggyBack: 400 mL    lactated ringers.: 900 mL  Total IN: 1300 mL    OUT:    Voided: 650 mL  Total OUT: 650 mL    Total NET: 650 mL    Physical  General: alert and oriented, NAD  Resp: airway patent, respirations unlabored  CVS: regular rate and rhythm  Abdomen: soft, nondistended, +TTP near drain site, IR drain in place with minimal serous output in bulb  Extremities: no edema  Skin: warm, dry, appropriate color    Labs                                            11.8                  Neurophils% (auto):   x      (07-30 @ 06:30):    8.59 )-----------(417          Lymphocytes% (auto):  x                                             39.0                   Eosinphils% (auto):   x        Manual%: Neutrophils x    ; Lymphocytes x    ; Eosinophils x    ; Bands%: x    ; Blasts x          07-30    134<L>  |  101  |  6<L>  ----------------------------<  170<H>  3.6   |  21<L>  |  0.52    Ca    8.3<L>      30 Jul 2020 06:30  Phos  3.9     07-30  Mg     1.6     07-30    TPro  6.0  /  Alb  3.5  /  TBili  0.2  /  DBili  x   /  AST  7   /  ALT  7   /  AlkPhos  65  07-29    ( 07-29 @ 23:40 )   PT: 12.9 SEC;   INR: 1.13   aPTT: 32.4 SEC      VBG - ( 29 Jul 2020 16:34 )  pH: x     /  pCO2: x     /  pO2: x     / HCO3: x     / Base Excess: x     /  SvO2: x     / Lactate: 1.5      RECENT CULTURES:        PT/INR - ( 29 Jul 2020 23:40 )   PT: 12.9 SEC;   INR: 1.13          PTT - ( 29 Jul 2020 23:40 )  PTT:32.4 SEC Surgery AM Progress Note    S: Pt seen and examined at bedside. Pt sates she had mild abdominal pain overnight. Patient doing well otherwise, tolerating regular diet, no acute complaints. Pt denies N/V/D/C, chest pain, SOB, fevers, or chills.    Meds  Neurologic Medications    Respiratory Medications    Cardiovascular Medications    Gastrointestinal Medications  dextrose 5%. 1000 milliLiter(s) IV Continuous <Continuous>  lactated ringers. 1000 milliLiter(s) IV Continuous <Continuous>    Genitourinary Medications    Hematologic/Oncologic Medications  enoxaparin Injectable 40 milliGRAM(s) SubCutaneous daily    Antimicrobial/Immunologic Medications  piperacillin/tazobactam IVPB.. 3.375 Gram(s) IV Intermittent every 8 hours    Endocrine/Metabolic Medications  dextrose 40% Gel 15 Gram(s) Oral once PRN Blood Glucose LESS THAN 70 milliGRAM(s)/deciliter  dextrose 50% Injectable 12.5 Gram(s) IV Push once  dextrose 50% Injectable 25 Gram(s) IV Push once  dextrose 50% Injectable 25 Gram(s) IV Push once  glucagon  Injectable 1 milliGRAM(s) IntraMuscular once PRN Glucose LESS THAN 70 milligrams/deciliter  insulin lispro (HumaLOG) corrective regimen sliding scale   SubCutaneous every 6 hours    Topical/Other Medications    Vitals  T(C): 37.1 (07-30-20 @ 05:01), Max: 37.1 (07-30-20 @ 05:01)  HR: 78 (07-30-20 @ 05:01) (18 - 90)  BP: 128/76 (07-30-20 @ 05:01) (98/57 - 143/81)  BP(mean): --  ABP: --  ABP(mean): --  RR: 16 (07-30-20 @ 05:01) (15 - 18)  SpO2: 100% (07-30-20 @ 05:01) (100% - 100%)  Wt(kg): --  CVP(mm Hg): --  CI: --  CAPILLARY BLOOD GLUCOSE      POCT Blood Glucose.: 164 mg/dL (30 Jul 2020 06:28)  POCT Blood Glucose.: 146 mg/dL (30 Jul 2020 01:02)  POCT Blood Glucose.: 131 mg/dL (29 Jul 2020 20:50)  POCT Blood Glucose.: 156 mg/dL (29 Jul 2020 16:19)  POCT Blood Glucose.: 246 mg/dL (29 Jul 2020 09:03)   N/A      07-29 @ 07:01  -  07-30 @ 07:00  --------------------------------------------------------  IN:    IV PiggyBack: 400 mL    lactated ringers.: 900 mL  Total IN: 1300 mL    OUT:    Voided: 650 mL  Total OUT: 650 mL    Total NET: 650 mL    Physical  General: alert and oriented, NAD  Resp: airway patent, respirations unlabored  CVS: regular rate and rhythm  Abdomen: soft, nondistended  Extremities: no edema  Skin: warm, dry, appropriate color    Labs                                            11.8                  Neurophils% (auto):   x      (07-30 @ 06:30):    8.59 )-----------(417          Lymphocytes% (auto):  x                                             39.0                   Eosinphils% (auto):   x        Manual%: Neutrophils x    ; Lymphocytes x    ; Eosinophils x    ; Bands%: x    ; Blasts x          07-30    134<L>  |  101  |  6<L>  ----------------------------<  170<H>  3.6   |  21<L>  |  0.52    Ca    8.3<L>      30 Jul 2020 06:30  Phos  3.9     07-30  Mg     1.6     07-30    TPro  6.0  /  Alb  3.5  /  TBili  0.2  /  DBili  x   /  AST  7   /  ALT  7   /  AlkPhos  65  07-29    ( 07-29 @ 23:40 )   PT: 12.9 SEC;   INR: 1.13   aPTT: 32.4 SEC      VBG - ( 29 Jul 2020 16:34 )  pH: x     /  pCO2: x     /  pO2: x     / HCO3: x     / Base Excess: x     /  SvO2: x     / Lactate: 1.5      RECENT CULTURES:        PT/INR - ( 29 Jul 2020 23:40 )   PT: 12.9 SEC;   INR: 1.13          PTT - ( 29 Jul 2020 23:40 )  PTT:32.4 SEC

## 2020-07-30 NOTE — PROGRESS NOTE ADULT - ASSESSMENT
54F with perforated diverticulitis with intraabdominal collection s/p IR drain placement. Near complete resolution of collection demonstrated on CT 5/7. Pain and tenderness significantly improved, leukocytosis improving, tolerating regular diet.     Plan  - Colonoscopy w/ GI today  - NPO  - DVT ppx  - monitor IR drain output  - Appreciate GI recs  - Analgesia and antiemetics as needed    B Team Surgery [id number] 54F with perforated diverticulitis with intraabdominal collection s/p IR drain placement. Near complete resolution of collection demonstrated on CT 5/7. Pain and tenderness significantly improved, leukocytosis improving, tolerating regular diet.     Plan  - Colonoscopy w/ GI today  - NPO  - DVT ppx  - Appreciate GI recs  - Analgesia and antiemetics as needed    B Team Surgery [id number] 54F PMH DM, prior episode of presumed perforated diverticulitis treated with IV abx and drainage, now with evidence of LBO with focal thickening at descending colon, possibly 2/2 malignancy.     Plan  - Colonoscopy w/ GI today  - NPO  - DVT ppx  - Appreciate GI recs  - Analgesia and antiemetics as needed    B Team Surgery [id number]

## 2020-07-30 NOTE — ASU PREOP CHECKLIST - SURGICAL CONSENT
Controlled Substance Refill Request for Adderall  Problem List Complete:  No     PROVIDER TO CONSIDER COMPLETION OF PROBLEM LIST AND OVERVIEW/CONTROLLED SUBSTANCE AGREEMENT    Last Written Prescription Date:  5/22/20  Last Fill Quantity: 60,   # refills: 0    Last Office Visit with [facility] primary care provider: 6/11/20    Future Office visit:     Controlled substance agreement:   Encounter-Level CSA - 06/06/2016:    Controlled Substance Agreement - Scan on 6/7/2016  8:22 AM: N[place] CONTROLLED SUBSTANCE AGREEMENT, 6/6/16     Patient-Level CSA:    There are no patient-level csa.         Last Urine Drug Screen: No results found for: CDAUT, No results found for: COMDAT, No results found for: THC13, PCP13, COC13, MAMP13, OPI13, AMP13, BZO13, TCA13, MTD13, BAR13, OXY13, PPX13, BUP13    [email or web address]       checked in past 3 months?  Yes 5/22/20         done

## 2020-07-30 NOTE — CHART NOTE - NSCHARTNOTEFT_GEN_A_CORE
Patient seen at bedside for abdominal pain.  Patient walked from bathroom to bed at time of exam, however did not have any flatus of BMs.  She is currently having minimal abdominal pain that has been the same since coming into the hospital.  Her abdomen is soft, moderately distended, and slightly TTP in the RLQ.  She has no signs of peritonitis.  Labs were sent 2 hours prior and show no leukocytosis and no SARA.  Her lactate was normal at 1.0.  An abdominal xray was ordered and will review with radiology.  Will continue serial abdominal exams and plan for GI scope with possible stent today.     Tyrone Morales PGY4  A Team Surgery #[id number]

## 2020-07-31 PROBLEM — K57.80 DIVERTICULITIS OF INTESTINE, PART UNSPECIFIED, WITH PERFORATION AND ABSCESS WITHOUT BLEEDING: Chronic | Status: ACTIVE | Noted: 2020-07-29

## 2020-07-31 LAB
ANION GAP SERPL CALC-SCNC: 12 MMO/L — SIGNIFICANT CHANGE UP (ref 7–14)
BUN SERPL-MCNC: 5 MG/DL — LOW (ref 7–23)
CALCIUM SERPL-MCNC: 8.6 MG/DL — SIGNIFICANT CHANGE UP (ref 8.4–10.5)
CHLORIDE SERPL-SCNC: 104 MMOL/L — SIGNIFICANT CHANGE UP (ref 98–107)
CO2 SERPL-SCNC: 20 MMOL/L — LOW (ref 22–31)
CREAT SERPL-MCNC: 0.48 MG/DL — LOW (ref 0.5–1.3)
GLUCOSE BLDC GLUCOMTR-MCNC: 138 MG/DL — HIGH (ref 70–99)
GLUCOSE BLDC GLUCOMTR-MCNC: 139 MG/DL — HIGH (ref 70–99)
GLUCOSE BLDC GLUCOMTR-MCNC: 139 MG/DL — HIGH (ref 70–99)
GLUCOSE BLDC GLUCOMTR-MCNC: 144 MG/DL — HIGH (ref 70–99)
GLUCOSE BLDC GLUCOMTR-MCNC: 146 MG/DL — HIGH (ref 70–99)
GLUCOSE SERPL-MCNC: 151 MG/DL — HIGH (ref 70–99)
HCT VFR BLD CALC: 37.8 % — SIGNIFICANT CHANGE UP (ref 34.5–45)
HGB BLD-MCNC: 11.6 G/DL — SIGNIFICANT CHANGE UP (ref 11.5–15.5)
MAGNESIUM SERPL-MCNC: 1.9 MG/DL — SIGNIFICANT CHANGE UP (ref 1.6–2.6)
MCHC RBC-ENTMCNC: 24.3 PG — LOW (ref 27–34)
MCHC RBC-ENTMCNC: 30.7 % — LOW (ref 32–36)
MCV RBC AUTO: 79.2 FL — LOW (ref 80–100)
NRBC # FLD: 0 K/UL — SIGNIFICANT CHANGE UP (ref 0–0)
PHOSPHATE SERPL-MCNC: 3.1 MG/DL — SIGNIFICANT CHANGE UP (ref 2.5–4.5)
PLATELET # BLD AUTO: 426 K/UL — HIGH (ref 150–400)
PMV BLD: 9.6 FL — SIGNIFICANT CHANGE UP (ref 7–13)
POTASSIUM SERPL-MCNC: 3.6 MMOL/L — SIGNIFICANT CHANGE UP (ref 3.5–5.3)
POTASSIUM SERPL-SCNC: 3.6 MMOL/L — SIGNIFICANT CHANGE UP (ref 3.5–5.3)
RBC # BLD: 4.77 M/UL — SIGNIFICANT CHANGE UP (ref 3.8–5.2)
RBC # FLD: 16.9 % — HIGH (ref 10.3–14.5)
SODIUM SERPL-SCNC: 136 MMOL/L — SIGNIFICANT CHANGE UP (ref 135–145)
WBC # BLD: 9.03 K/UL — SIGNIFICANT CHANGE UP (ref 3.8–10.5)
WBC # FLD AUTO: 9.03 K/UL — SIGNIFICANT CHANGE UP (ref 3.8–10.5)

## 2020-07-31 PROCEDURE — 99232 SBSQ HOSP IP/OBS MODERATE 35: CPT | Mod: GC

## 2020-07-31 PROCEDURE — 93306 TTE W/DOPPLER COMPLETE: CPT | Mod: 26

## 2020-07-31 PROCEDURE — 93010 ELECTROCARDIOGRAM REPORT: CPT

## 2020-07-31 RX ORDER — POTASSIUM CHLORIDE 20 MEQ
10 PACKET (EA) ORAL DAILY
Refills: 0 | Status: COMPLETED | OUTPATIENT
Start: 2020-07-31 | End: 2020-07-31

## 2020-07-31 RX ORDER — POTASSIUM CHLORIDE 20 MEQ
10 PACKET (EA) ORAL
Refills: 0 | Status: DISCONTINUED | OUTPATIENT
Start: 2020-07-31 | End: 2020-07-31

## 2020-07-31 RX ORDER — DEXTROSE MONOHYDRATE, SODIUM CHLORIDE, AND POTASSIUM CHLORIDE 50; .745; 4.5 G/1000ML; G/1000ML; G/1000ML
1000 INJECTION, SOLUTION INTRAVENOUS
Refills: 0 | Status: DISCONTINUED | OUTPATIENT
Start: 2020-07-31 | End: 2020-08-01

## 2020-07-31 RX ORDER — MAGNESIUM SULFATE 500 MG/ML
2 VIAL (ML) INJECTION ONCE
Refills: 0 | Status: COMPLETED | OUTPATIENT
Start: 2020-07-31 | End: 2020-07-31

## 2020-07-31 RX ORDER — POTASSIUM CHLORIDE 20 MEQ
10 PACKET (EA) ORAL DAILY
Refills: 0 | Status: DISCONTINUED | OUTPATIENT
Start: 2020-07-31 | End: 2020-07-31

## 2020-07-31 RX ORDER — INSULIN LISPRO 100/ML
VIAL (ML) SUBCUTANEOUS
Refills: 0 | Status: DISCONTINUED | OUTPATIENT
Start: 2020-07-31 | End: 2020-08-01

## 2020-07-31 RX ADMIN — Medication 1 TABLET(S): at 12:15

## 2020-07-31 RX ADMIN — PIPERACILLIN AND TAZOBACTAM 25 GRAM(S): 4; .5 INJECTION, POWDER, LYOPHILIZED, FOR SOLUTION INTRAVENOUS at 07:17

## 2020-07-31 RX ADMIN — PIPERACILLIN AND TAZOBACTAM 25 GRAM(S): 4; .5 INJECTION, POWDER, LYOPHILIZED, FOR SOLUTION INTRAVENOUS at 21:47

## 2020-07-31 RX ADMIN — Medication 10 MILLIEQUIVALENT(S): at 12:15

## 2020-07-31 RX ADMIN — DEXTROSE MONOHYDRATE, SODIUM CHLORIDE, AND POTASSIUM CHLORIDE 75 MILLILITER(S): 50; .745; 4.5 INJECTION, SOLUTION INTRAVENOUS at 21:47

## 2020-07-31 RX ADMIN — Medication 50 GRAM(S): at 12:14

## 2020-07-31 RX ADMIN — ENOXAPARIN SODIUM 40 MILLIGRAM(S): 100 INJECTION SUBCUTANEOUS at 12:14

## 2020-07-31 RX ADMIN — PIPERACILLIN AND TAZOBACTAM 25 GRAM(S): 4; .5 INJECTION, POWDER, LYOPHILIZED, FOR SOLUTION INTRAVENOUS at 14:38

## 2020-07-31 NOTE — PROGRESS NOTE ADULT - ASSESSMENT
Impression:  # Colonic Stricture: Concerning for colorectal cancer given appearance during colonoscopy s/p stent placement. Biopsies performed; path pending. Differential includes ischemia/inflammation  # DM2    Recommendation:  - Follow up pathology  - Supportive care per primary team    Kira Otoole MD  Gastroenterology Fellow  [phone]  Available on [employer] Teams  Please page on call fellow on weekends and after 5pm on weekdays: [phone]

## 2020-07-31 NOTE — PROGRESS NOTE ADULT - SUBJECTIVE AND OBJECTIVE BOX
Chief Complaint:  Patient is a 54y old  Female who presents with a chief complaint of Large bowel obstruction (2020 07:05)    Interval Events:   No acute overnight events  Reports passing gas and having loose stools. No nausea, vomiting, melena, hematochezia    Allergies:  No Known Allergies    Hospital Medications:  acetaminophen  IVPB .. 1000 milliGRAM(s) IV Intermittent once  dextrose 40% Gel 15 Gram(s) Oral once PRN  dextrose 5%. 1000 milliLiter(s) IV Continuous <Continuous>  dextrose 50% Injectable 12.5 Gram(s) IV Push once  dextrose 50% Injectable 25 Gram(s) IV Push once  dextrose 50% Injectable 25 Gram(s) IV Push once  enoxaparin Injectable 40 milliGRAM(s) SubCutaneous daily  glucagon  Injectable 1 milliGRAM(s) IntraMuscular once PRN  insulin lispro (HumaLOG) corrective regimen sliding scale   SubCutaneous every 6 hours  lactated ringers. 1000 milliLiter(s) IV Continuous <Continuous>  magnesium sulfate  IVPB 2 Gram(s) IV Intermittent once  multivitamin 1 Tablet(s) Oral daily  piperacillin/tazobactam IVPB.. 3.375 Gram(s) IV Intermittent every 8 hours  potassium chloride  10 mEq/100 mL IVPB 10 milliEquivalent(s) IV Intermittent every 1 hour    PMHX/PSHX:  Perforated diverticulum  Uncontrolled type 2 diabetes mellitus with complication  No pertinent past medical history  No significant past surgical history    ROS:   General:  No fevers, chills or night sweats  ENT:  No sore throat or dysphagia  CV:  No pain or palpitations  Resp:  No dyspnea, cough or  wheezing  GI:  No pain, No nausea, No vomiting, No diarrhea, No rectal bleeding, No tarry stools,  Skin:  No rash or edema    PHYSICAL EXAM:   Vital Signs:  Vital Signs Last 24 Hrs  T(C): 36.8 (2020 07:13), Max: 37.5 (2020 18:30)  T(F): 98.2 (2020 07:13), Max: 99.5 (2020 18:30)  HR: 90 (2020 07:13) (79 - 90)  BP: 128/88 (2020 07:13) (111/58 - 140/82)  BP(mean): 102 (2020 12:00) (102 - 102)  RR: 18 (2020 07:13) (16 - 20)  SpO2: 100% (2020 07:13) (94% - 100%)  Daily Height in cm: 152.4 (2020 10:04)    Daily     GENERAL:  NAD, Appears stated age  HEENT:  NC/AT,  conjunctivae clear and pink, sclera -anicteric  CHEST:  Normal Effort, Breath sounds clear  HEART:  RRR, S1 + S2, no murmurs  ABDOMEN:  Soft, non-tender, non-distended, BS+  EXTEREMITIES:  no cyanosis  SKIN:  Warm & Dry.  NEURO:  Alert, oriented    LABS:                        11.6   9.03  )-----------( 426      ( 2020 07:45 )             37.8     Mean Cell Volume: 79.2 fL (20 @ 07:45)        136  |  104  |  5<L>  ----------------------------<  151<H>  3.6   |  20<L>  |  0.48<L>    Ca    8.6      2020 07:45  Phos  3.1       Mg     1.9     31    TPro  6.0  /  Alb  3.5  /  TBili  0.2  /  DBili  x   /  AST  7   /  ALT  7   /  AlkPhos  65  29    LIVER FUNCTIONS - ( 2020 12:32 )  Alb: 3.5 g/dL / Pro: 6.0 g/dL / ALK PHOS: 65 u/L / ALT: 7 u/L / AST: 7 u/L / GGT: x           PT/INR - ( 2020 23:40 )   PT: 12.9 SEC;   INR: 1.13          PTT - ( 2020 23:40 )  PTT:32.4 SEC  Urinalysis Basic - ( 2020 11:50 )    Color: YELLOW / Appearance: Lt TURBID / S.031 / pH: 5.5  Gluc: 200 / Ketone: NEGATIVE  / Bili: NEGATIVE / Urobili: NORMAL   Blood: NEGATIVE / Protein: 30 / Nitrite: NEGATIVE   Leuk Esterase: SMALL / RBC: 3-5 / WBC 26-50   Sq Epi: MODERATE / Non Sq Epi: x / Bacteria: MANY                          11.6   9.03  )-----------( 426      ( 2020 07:45 )             37.8                         11.8   8.59  )-----------( 417      ( 2020 06:30 )             39.0                         12.1   9.87  )-----------( 419      ( 2020 23:40 )             37.7                         11.2   11.10 )-----------( 316      ( 2020 12:32 )             35.2                         11.9   11.67 )-----------( 400      ( 2020 10:00 )             36.8       Imaging:

## 2020-07-31 NOTE — PROGRESS NOTE ADULT - ASSESSMENT
54F with perforated diverticulitis with intraabdominal collection s/p IR drain placement. Near complete resolution of collection demonstrated on CT 5/7. Pain and tenderness significantly improved, leukocytosis improving, tolerating regular diet.     Plan  - CLD; Possible advancement pending breakfast tolerance  - NPO  - DVT ppx  - monitor IR drain output  - Appreciate GI recs  - Analgesia and antiemetics as needed    B Team Surgery [id number] 54F with perforated diverticulitis with intraabdominal collection s/p IR drain placement. Near complete resolution of collection demonstrated on CT 5/7. Pain and tenderness significantly improved, leukocytosis improving, tolerating regular diet.     Plan  - CLD; Possible advancement pending breakfast tolerance and GI clearance  - NPO  - DVT ppx  - monitor IR drain output  - Appreciate GI recs  - Analgesia and antiemetics as needed    B Team Surgery [id number] with perforated diverticulitis with intraabdominal collection s/p IR drain placement. Near complete resolution of collection demonstrated on CT 5/7. Pain and tenderness significantly improved, leukocytosis improving, tolerating regular diet.     Plan  - CLD; Possible advancement pending breakfast tolerance and GI clearance  - NPO  - DVT ppx  - Appreciate GI recs  - Analgesia and antiemetics as needed    B Team Surgery [id number] 54F PMH DM, prior episode of presumed perforated diverticulitis treated with IV abx and drainage, now with evidence of LBO with focal thickening at descending colon, possibly 2/2 malignancy. Patient is s/p colonoscopy 7/30 w/ descending colon stent placement, biopsy taken.    Plan  - CLD; Possible advancement pending breakfast tolerance and GI clearance  - F/u mass biopsy  - NPO  - DVT ppx  - Appreciate GI recs  - Analgesia and antiemetics as needed    B Team Surgery [id number]

## 2020-07-31 NOTE — PROGRESS NOTE ADULT - SUBJECTIVE AND OBJECTIVE BOX
Surgery AM Progress Note    S: Pt seen and examined at bedside. Pt sates she had mild abdominal pain overnight w/ several episodes of loose stool post undergoing colonoscopy in which stent was placed. Patient doing well otherwise, tolerating CLD, no acute complaints. Pt denies N/V/D/C, chest pain, SOB, fevers, or chills.    Meds  Neurologic Medications  acetaminophen  IVPB .. 1000 milliGRAM(s) IV Intermittent once    Respiratory Medications    Cardiovascular Medications    Gastrointestinal Medications  dextrose 5%. 1000 milliLiter(s) IV Continuous <Continuous>  lactated ringers. 1000 milliLiter(s) IV Continuous <Continuous>  multivitamin 1 Tablet(s) Oral daily    Genitourinary Medications    Hematologic/Oncologic Medications  enoxaparin Injectable 40 milliGRAM(s) SubCutaneous daily    Antimicrobial/Immunologic Medications  piperacillin/tazobactam IVPB.. 3.375 Gram(s) IV Intermittent every 8 hours    Endocrine/Metabolic Medications  dextrose 40% Gel 15 Gram(s) Oral once PRN Blood Glucose LESS THAN 70 milliGRAM(s)/deciliter  dextrose 50% Injectable 12.5 Gram(s) IV Push once  dextrose 50% Injectable 25 Gram(s) IV Push once  dextrose 50% Injectable 25 Gram(s) IV Push once  glucagon  Injectable 1 milliGRAM(s) IntraMuscular once PRN Glucose LESS THAN 70 milligrams/deciliter  insulin lispro (HumaLOG) corrective regimen sliding scale   SubCutaneous every 6 hours    Topical/Other Medications      Vitals  T(C): 36.8 (07-31-20 @ 01:43), Max: 37.5 (07-30-20 @ 18:30)  HR: 88 (07-31-20 @ 01:43) (79 - 88)  BP: 111/58 (07-31-20 @ 01:43) (111/58 - 140/82)  BP(mean): 102 (07-30-20 @ 12:00) (102 - 102)  ABP: --  ABP(mean): --  RR: 16 (07-31-20 @ 01:43) (16 - 20)  SpO2: 99% (07-31-20 @ 01:43) (94% - 100%)  Wt(kg): --  CVP(mm Hg): --  CI: --  CAPILLARY BLOOD GLUCOSE      POCT Blood Glucose.: 139 mg/dL (31 Jul 2020 06:32)  POCT Blood Glucose.: 124 mg/dL (30 Jul 2020 23:33)  POCT Blood Glucose.: 192 mg/dL (30 Jul 2020 17:51)  POCT Blood Glucose.: 192 mg/dL (30 Jul 2020 12:27)   N/A      07-30 @ 07:01  -  07-31 @ 07:00  --------------------------------------------------------  IN:    IV PiggyBack: 100 mL    lactated ringers.: 800 mL    Oral Fluid: 240 mL  Total IN: 1140 mL    OUT:    Voided: 1300 mL  Total OUT: 1300 mL    Total NET: -160 mL    Physical  General: alert and oriented, NAD  Resp: airway patent, respirations unlabored  CVS: regular rate and rhythm  Abdomen: soft, nondistended, +TTP near drain site, IR drain in place with minimal serous output in bulb  Extremities: no edema  Skin: warm, dry, appropriate color    Labs      07-30    134<L>  |  101  |  6<L>  ----------------------------<  170<H>  3.6   |  21<L>  |  0.52    Ca    8.3<L>      30 Jul 2020 06:30  Phos  3.9     07-30  Mg     1.6     07-30    TPro  6.0  /  Alb  3.5  /  TBili  0.2  /  DBili  x   /  AST  7   /  ALT  7   /  AlkPhos  65  07-29        VBG - ( 29 Jul 2020 16:34 )  pH: x     /  pCO2: x     /  pO2: x     / HCO3: x     / Base Excess: x     /  SvO2: x     / Lactate: 1.5      RECENT CULTURES:        PT/INR - ( 29 Jul 2020 23:40 )   PT: 12.9 SEC;   INR: 1.13          PTT - ( 29 Jul 2020 23:40 )  PTT:32.4 SEC Surgery AM Progress Note    S: Pt seen and examined at bedside. Pt sates she had mild abdominal pain overnight w/ several episodes of loose stool post undergoing colonoscopy in which stent was placed. Patient doing well otherwise, tolerating CLD, no acute complaints. Pt denies N/V/D/C, chest pain, SOB, fevers, or chills.    Meds  Neurologic Medications  acetaminophen  IVPB .. 1000 milliGRAM(s) IV Intermittent once    Respiratory Medications    Cardiovascular Medications    Gastrointestinal Medications  dextrose 5%. 1000 milliLiter(s) IV Continuous <Continuous>  lactated ringers. 1000 milliLiter(s) IV Continuous <Continuous>  multivitamin 1 Tablet(s) Oral daily    Genitourinary Medications    Hematologic/Oncologic Medications  enoxaparin Injectable 40 milliGRAM(s) SubCutaneous daily    Antimicrobial/Immunologic Medications  piperacillin/tazobactam IVPB.. 3.375 Gram(s) IV Intermittent every 8 hours    Endocrine/Metabolic Medications  dextrose 40% Gel 15 Gram(s) Oral once PRN Blood Glucose LESS THAN 70 milliGRAM(s)/deciliter  dextrose 50% Injectable 12.5 Gram(s) IV Push once  dextrose 50% Injectable 25 Gram(s) IV Push once  dextrose 50% Injectable 25 Gram(s) IV Push once  glucagon  Injectable 1 milliGRAM(s) IntraMuscular once PRN Glucose LESS THAN 70 milligrams/deciliter  insulin lispro (HumaLOG) corrective regimen sliding scale   SubCutaneous every 6 hours    Topical/Other Medications      Vitals  T(C): 36.8 (07-31-20 @ 01:43), Max: 37.5 (07-30-20 @ 18:30)  HR: 88 (07-31-20 @ 01:43) (79 - 88)  BP: 111/58 (07-31-20 @ 01:43) (111/58 - 140/82)  BP(mean): 102 (07-30-20 @ 12:00) (102 - 102)  ABP: --  ABP(mean): --  RR: 16 (07-31-20 @ 01:43) (16 - 20)  SpO2: 99% (07-31-20 @ 01:43) (94% - 100%)  Wt(kg): --  CVP(mm Hg): --  CI: --  CAPILLARY BLOOD GLUCOSE      POCT Blood Glucose.: 139 mg/dL (31 Jul 2020 06:32)  POCT Blood Glucose.: 124 mg/dL (30 Jul 2020 23:33)  POCT Blood Glucose.: 192 mg/dL (30 Jul 2020 17:51)  POCT Blood Glucose.: 192 mg/dL (30 Jul 2020 12:27)   N/A      07-30 @ 07:01  -  07-31 @ 07:00  --------------------------------------------------------  IN:    IV PiggyBack: 100 mL    lactated ringers.: 800 mL    Oral Fluid: 240 mL  Total IN: 1140 mL    OUT:    Voided: 1300 mL  Total OUT: 1300 mL    Total NET: -160 mL    Physical  General: alert and oriented, NAD  Resp: airway patent, respirations unlabored  CVS: regular rate and rhythm  Abdomen: soft, nondistended  Extremities: no edema  Skin: warm, dry, appropriate color    Labs      07-30    134<L>  |  101  |  6<L>  ----------------------------<  170<H>  3.6   |  21<L>  |  0.52    Ca    8.3<L>      30 Jul 2020 06:30  Phos  3.9     07-30  Mg     1.6     07-30    TPro  6.0  /  Alb  3.5  /  TBili  0.2  /  DBili  x   /  AST  7   /  ALT  7   /  AlkPhos  65  07-29        VBG - ( 29 Jul 2020 16:34 )  pH: x     /  pCO2: x     /  pO2: x     / HCO3: x     / Base Excess: x     /  SvO2: x     / Lactate: 1.5      RECENT CULTURES:        PT/INR - ( 29 Jul 2020 23:40 )   PT: 12.9 SEC;   INR: 1.13          PTT - ( 29 Jul 2020 23:40 )  PTT:32.4 SEC

## 2020-08-01 ENCOUNTER — TRANSCRIPTION ENCOUNTER (OUTPATIENT)
Age: 55
End: 2020-08-01

## 2020-08-01 VITALS
OXYGEN SATURATION: 100 % | DIASTOLIC BLOOD PRESSURE: 79 MMHG | SYSTOLIC BLOOD PRESSURE: 137 MMHG | HEART RATE: 80 BPM | RESPIRATION RATE: 17 BRPM | TEMPERATURE: 98 F

## 2020-08-01 LAB
ANION GAP SERPL CALC-SCNC: 12 MMO/L — SIGNIFICANT CHANGE UP (ref 7–14)
BUN SERPL-MCNC: 3 MG/DL — LOW (ref 7–23)
CALCIUM SERPL-MCNC: 8 MG/DL — LOW (ref 8.4–10.5)
CHLORIDE SERPL-SCNC: 104 MMOL/L — SIGNIFICANT CHANGE UP (ref 98–107)
CO2 SERPL-SCNC: 22 MMOL/L — SIGNIFICANT CHANGE UP (ref 22–31)
CREAT SERPL-MCNC: 0.48 MG/DL — LOW (ref 0.5–1.3)
GLUCOSE BLDC GLUCOMTR-MCNC: 173 MG/DL — HIGH (ref 70–99)
GLUCOSE BLDC GLUCOMTR-MCNC: 216 MG/DL — HIGH (ref 70–99)
GLUCOSE SERPL-MCNC: 195 MG/DL — HIGH (ref 70–99)
HCT VFR BLD CALC: 35.2 % — SIGNIFICANT CHANGE UP (ref 34.5–45)
HGB BLD-MCNC: 10.7 G/DL — LOW (ref 11.5–15.5)
MAGNESIUM SERPL-MCNC: 2 MG/DL — SIGNIFICANT CHANGE UP (ref 1.6–2.6)
MCHC RBC-ENTMCNC: 24.6 PG — LOW (ref 27–34)
MCHC RBC-ENTMCNC: 30.4 % — LOW (ref 32–36)
MCV RBC AUTO: 80.9 FL — SIGNIFICANT CHANGE UP (ref 80–100)
NRBC # FLD: 0 K/UL — SIGNIFICANT CHANGE UP (ref 0–0)
PHOSPHATE SERPL-MCNC: 2.7 MG/DL — SIGNIFICANT CHANGE UP (ref 2.5–4.5)
PLATELET # BLD AUTO: 402 K/UL — HIGH (ref 150–400)
PMV BLD: 9.9 FL — SIGNIFICANT CHANGE UP (ref 7–13)
POTASSIUM SERPL-MCNC: 3.6 MMOL/L — SIGNIFICANT CHANGE UP (ref 3.5–5.3)
POTASSIUM SERPL-SCNC: 3.6 MMOL/L — SIGNIFICANT CHANGE UP (ref 3.5–5.3)
RBC # BLD: 4.35 M/UL — SIGNIFICANT CHANGE UP (ref 3.8–5.2)
RBC # FLD: 17 % — HIGH (ref 10.3–14.5)
SODIUM SERPL-SCNC: 138 MMOL/L — SIGNIFICANT CHANGE UP (ref 135–145)
WBC # BLD: 8.2 K/UL — SIGNIFICANT CHANGE UP (ref 3.8–10.5)
WBC # FLD AUTO: 8.2 K/UL — SIGNIFICANT CHANGE UP (ref 3.8–10.5)

## 2020-08-01 PROCEDURE — 99233 SBSQ HOSP IP/OBS HIGH 50: CPT

## 2020-08-01 RX ORDER — POLYETHYLENE GLYCOL 3350 17 G/17G
17 POWDER, FOR SOLUTION ORAL
Qty: 510 | Refills: 0
Start: 2020-08-01 | End: 2020-08-30

## 2020-08-01 RX ORDER — POTASSIUM PHOSPHATE, MONOBASIC POTASSIUM PHOSPHATE, DIBASIC 236; 224 MG/ML; MG/ML
15 INJECTION, SOLUTION INTRAVENOUS ONCE
Refills: 0 | Status: COMPLETED | OUTPATIENT
Start: 2020-08-01 | End: 2020-08-01

## 2020-08-01 RX ADMIN — ENOXAPARIN SODIUM 40 MILLIGRAM(S): 100 INJECTION SUBCUTANEOUS at 10:56

## 2020-08-01 RX ADMIN — Medication 2: at 08:50

## 2020-08-01 RX ADMIN — Medication 1 TABLET(S): at 10:56

## 2020-08-01 RX ADMIN — POTASSIUM PHOSPHATE, MONOBASIC POTASSIUM PHOSPHATE, DIBASIC 62.5 MILLIMOLE(S): 236; 224 INJECTION, SOLUTION INTRAVENOUS at 10:56

## 2020-08-01 RX ADMIN — PIPERACILLIN AND TAZOBACTAM 25 GRAM(S): 4; .5 INJECTION, POWDER, LYOPHILIZED, FOR SOLUTION INTRAVENOUS at 12:04

## 2020-08-01 RX ADMIN — Medication 1: at 12:06

## 2020-08-01 RX ADMIN — PIPERACILLIN AND TAZOBACTAM 25 GRAM(S): 4; .5 INJECTION, POWDER, LYOPHILIZED, FOR SOLUTION INTRAVENOUS at 06:10

## 2020-08-01 NOTE — PROGRESS NOTE ADULT - SUBJECTIVE AND OBJECTIVE BOX
GENERAL SURGERY PROGRESS NOTE TEAM A  ----------------------------------------------------------------------------------    IRENEKATHIE DONALDSON  |  [id number]   |   54y  |    Female   |   JORD[address]   |    Admit:  07-29-20 (3d)    Attending: Jony Barbour      Patient is a 54y old  Female who presents with a chief complaint of Large bowel obstruction (31 Jul 2020 09:14)      ----------------------------------------------------------------------------------    SUBJECTIVE:   Patient seen today during morning rounds at bedside and without acute distress. Patient is doing well and is has adequate bowel movements and passing gas.   24 hour events: None  Overnight: None    OBJECTIVE:  MEDICATIONS  (STANDING):  acetaminophen  IVPB .. 1000 milliGRAM(s) IV Intermittent once  dextrose 5%. 1000 milliLiter(s) (50 mL/Hr) IV Continuous <Continuous>  dextrose 50% Injectable 12.5 Gram(s) IV Push once  dextrose 50% Injectable 25 Gram(s) IV Push once  dextrose 50% Injectable 25 Gram(s) IV Push once  enoxaparin Injectable 40 milliGRAM(s) SubCutaneous daily  insulin lispro (HumaLOG) corrective regimen sliding scale   SubCutaneous Before meals and at bedtime  multivitamin 1 Tablet(s) Oral daily  piperacillin/tazobactam IVPB.. 3.375 Gram(s) IV Intermittent every 8 hours  potassium phosphate IVPB 15 milliMole(s) IV Intermittent once    MEDICATIONS  (PRN):  dextrose 40% Gel 15 Gram(s) Oral once PRN Blood Glucose LESS THAN 70 milliGRAM(s)/deciliter  glucagon  Injectable 1 milliGRAM(s) IntraMuscular once PRN Glucose LESS THAN 70 milligrams/deciliter      Allergies    No Known Allergies    Intolerances        PMH:   Perforated diverticulum  Uncontrolled type 2 diabetes mellitus with complication  No significant past surgical history      Vitals:  Vital Signs Last 24 Hrs  T(C): 37.3 (01 Aug 2020 10:46), Max: 37.3 (01 Aug 2020 10:46)  T(F): 99.2 (01 Aug 2020 10:46), Max: 99.2 (01 Aug 2020 10:46)  HR: 92 (01 Aug 2020 10:46) (70 - 92)  BP: 125/85 (01 Aug 2020 10:46) (112/80 - 129/85)  BP(mean): --  RR: 16 (01 Aug 2020 10:46) (15 - 18)  SpO2: 100% (01 Aug 2020 10:46) (98% - 100%)      Physical Exam:  Constitutional: resting in bed with no acute distress  Neuro: AAOx3  Respiratory:  unlabored breathing  Gastrointestinal: Abdomen soft, non distended, non tenderness,   Extremities:  No edema, no calf tenderness  Skin: Non-jaundiced, no cyanosis or rash observed    LABS:  T(C): 37.3 (08-01-20 @ 10:46), Max: 37.3 (08-01-20 @ 10:46)  HR: 92 (08-01-20 @ 10:46) (70 - 92)  BP: 125/85 (08-01-20 @ 10:46) (112/80 - 129/85)  BP(mean): --  ABP: --  ABP(mean): --  RR: 16 (08-01-20 @ 10:46) (15 - 18)  SpO2: 100% (08-01-20 @ 10:46) (98% - 100%)    CAPILLARY BLOOD GLUCOSE      POCT Blood Glucose.: 216 mg/dL (01 Aug 2020 08:39)  POCT Blood Glucose.: 139 mg/dL (31 Jul 2020 21:40)  POCT Blood Glucose.: 146 mg/dL (31 Jul 2020 17:44)  POCT Blood Glucose.: 138 mg/dL (31 Jul 2020 12:05)   N/A      07-31 @ 07:01  -  08-01 @ 07:00  --------------------------------------------------------  IN:    dextrose 5% + sodium chloride 0.45% with potassium chloride 20 mEq/L: 600 mL    Oral Fluid: 750 mL  Total IN: 1350 mL    OUT:    Stool: 1 mL    Voided: 700 mL  Total OUT: 701 mL    Total NET: 649 mL    Imaging:  No new studies or laboratory tests performed recently.

## 2020-08-01 NOTE — PROGRESS NOTE ADULT - ASSESSMENT
54F PMH DM, prior episode of perforated diverticulitis treated with IV abx and drainage, now with evidence of LBO with focal thickening at descending colon, possibly 2/2 malignancy. Patient is s/p colonoscopy 7/30 with colon stent placement. Patient is doing well today with return of bowel function, hemodynamically stable, NAD and afebrile. Patient was amenable to discharge today and started on a low fiber diet. She will be discharged to home with Mralax and have follow up with Dr. Barbour in 1 week. Patient verbalized understanding the plan and agreed.    Plan  - Discharge planning for home today  - F/U with Dr. Barbour outpatient in 1 week  - Low fiber soft diet  - Miralax for home  - DVT ppx      ATeam Surgery [id number]

## 2020-08-01 NOTE — PROGRESS NOTE ADULT - ATTENDING COMMENTS
LBO secondary to descending colon mass vs diverticular stricture  -GI for colonoscopy possible stent today  -NPO  -IV abx  -dvt ppx
pt feels well, + flatus, + BM yesterday, bertha clears    aaox3  abd soft, NT/ND    advance to soft diet  d/c home, already has appt with Dr. Barbour for tu
Obstructing descending colon mass  -Slowly advance diet  -Operative resection of stent in in 10 - 14 days to allow decompression of proximal bowel  -DVT ppx  -Cardiac clearance  -OOB

## 2020-08-01 NOTE — DISCHARGE NOTE PROVIDER - HOSPITAL COURSE
54 Year old Female with PMH of Diabetes and history of prior perforated diverticulitis treated with conservative management was admitted with a diagnosis of Large Bowel obstruction that on imaging showed focal thickening at the descending colon with possible malignancy. Patient was given IV antibiotics, IV fluids, and provided with bowel rest as primary management. Patient underwent a colonoscopy on 7/30 and a stent was placed by gastroenterology service.        Following stent placement patients obstruction resolved and had adequate return of bowel function. Patient was afebrile, hemodynamically stable, downtrending leukocytosis and tolerating pain with oral medications. Patient to be discharged today at the direction of Dr. Chambers and follow up in 1 week with Dr. Barbour. Patient agreed with the discharge plan and her questions were answered. Oriented to return to ED or reach out to our clinic if she develops any symptoms of concern.

## 2020-08-01 NOTE — DISCHARGE NOTE PROVIDER - NSDCACTIVITY_GEN_ALL_CORE
No heavy lifting/straining/Walking - Outdoors allowed/Showering allowed/Stairs allowed/Driving allowed/Walking - Indoors allowed

## 2020-08-01 NOTE — DISCHARGE NOTE NURSING/CASE MANAGEMENT/SOCIAL WORK - PATIENT PORTAL LINK FT
You can access the FollowMyHealth Patient Portal offered by [facility] by registering at the following website: [email or web address]. By joining [employer]’s FollowMyHealth portal, you will also be able to view your health information using other applications (apps) compatible with our system.

## 2020-08-01 NOTE — DISCHARGE NOTE PROVIDER - NSDCFUADDINST_GEN_ALL_CORE_FT
PAIN: You may continue to take Acetaminophen (Tylenol) and Ibuprofen over the counter for pain as needed. You can alternate the two medications, giving one every 3 hours    ACTIVITY: No heavy lifting, straining, or vigorous activity until your follow-up appointment in 2 weeks.     NOTIFY US IF: If any bleeding that does not stop, fever (over 100.5 F) or chills, persistent nausea/vomiting, persistent diarrhea, or pain not controlled on discharge pain medications.    FOLLOW-UP: Please call the office and make an appointment to follow up with Dr. Barbour. Please follow up with your primary care physician in 1-2 weeks regarding your hospitalization.

## 2020-08-01 NOTE — DISCHARGE NOTE PROVIDER - CARE PROVIDER_API CALL
MICKI DOMINGUEZ  Surgical Oncology  [address] [place] [address]  Phone: [phone]  Fax: [phone]  Scheduled Appointment: 08/04/2020

## 2020-08-01 NOTE — DISCHARGE NOTE PROVIDER - NSDCMRMEDTOKEN_GEN_ALL_CORE_FT
BD Sofia Pen Needles: Use one needle each day.  glucometer : Test and record your blood sugar four times a day; before meals and at bedtime   glucometer strips:   lancets: 1 application subcutaneous 4 times a day as directed  Lanjuan Lainezar Pen 100 units/mL subcutaneous solution: 12 unit(s) subcutaneous once a day (at bedtime)   metFORMIN 500 mg oral tablet: 1 tab(s) orally 2 times a day

## 2020-08-04 ENCOUNTER — APPOINTMENT (OUTPATIENT)
Dept: COLORECTAL SURGERY | Facility: CLINIC | Age: 55
End: 2020-08-04
Payer: COMMERCIAL

## 2020-08-04 DIAGNOSIS — K63.89 OTHER SPECIFIED DISEASES OF INTESTINE: ICD-10-CM

## 2020-08-04 DIAGNOSIS — K56.609 UNSPECIFIED INTESTINAL OBSTRUCTION, UNSPECIFIED AS TO PARTIAL VERSUS COMPLETE OBSTRUCTION: ICD-10-CM

## 2020-08-04 PROCEDURE — 99213 OFFICE O/P EST LOW 20 MIN: CPT

## 2020-08-04 RX ORDER — METFORMIN HYDROCHLORIDE 1000 MG/1
1000 TABLET, COATED ORAL
Refills: 0 | Status: ACTIVE | COMMUNITY

## 2020-08-04 NOTE — HISTORY OF PRESENT ILLNESS
[FreeTextEntry1] : 54-year-old female with recent hospital admission for large bowel prescription. A endoscopic stent was placed with likely malignant structure noted. Patient currently progressing well. Reports bowel movements every other day. Passing gas. No bloating or abdominal pain. No fevers or chills. No nausea or vomiting. She presents today to discuss surgical resection.Currently taking low-residue diet with MiraLax daily.

## 2020-08-04 NOTE — ASSESSMENT
[FreeTextEntry1] : Large bowel obstruction with colonic Mass.\par -We discussed the large bowel extraction and colonic mass at length. We discussed possible diagnosis is including malignancy. Given that stent cannot stay for a prolonged period, I recommended laparoscopic-assisted partial colectomy. We discussed risks and benefits at length including bleeding, infection, risk of injury to nearby structures, possible anastomotic dehiscence, possible end colostomy creation and possible diverting loop ileostomy creation\par -We discussed treatment options of potential cancer\par -We discussed enhance recovery protocol\par -Pathology from recent colonoscopy was yet to be available will continue to follow\par -Continue no fiber diet with MiraLax daily\par -Bowel prep to be given\par -Patient to schedule procedure within the next 10-14 days

## 2020-08-12 ENCOUNTER — OUTPATIENT (OUTPATIENT)
Dept: OUTPATIENT SERVICES | Facility: HOSPITAL | Age: 55
LOS: 1 days | End: 2020-08-12
Payer: COMMERCIAL

## 2020-08-12 VITALS
SYSTOLIC BLOOD PRESSURE: 128 MMHG | OXYGEN SATURATION: 98 % | HEART RATE: 74 BPM | WEIGHT: 182.1 LBS | HEIGHT: 61 IN | TEMPERATURE: 98 F | RESPIRATION RATE: 16 BRPM | DIASTOLIC BLOOD PRESSURE: 78 MMHG

## 2020-08-12 DIAGNOSIS — R52 PAIN, UNSPECIFIED: Chronic | ICD-10-CM

## 2020-08-12 DIAGNOSIS — K63.5 POLYP OF COLON: ICD-10-CM

## 2020-08-12 DIAGNOSIS — C18.9 MALIGNANT NEOPLASM OF COLON, UNSPECIFIED: ICD-10-CM

## 2020-08-12 DIAGNOSIS — E11.9 TYPE 2 DIABETES MELLITUS WITHOUT COMPLICATIONS: ICD-10-CM

## 2020-08-12 LAB
ANION GAP SERPL CALC-SCNC: 16 MMO/L — HIGH (ref 7–14)
BLD GP AB SCN SERPL QL: NEGATIVE — SIGNIFICANT CHANGE UP
BUN SERPL-MCNC: 13 MG/DL — SIGNIFICANT CHANGE UP (ref 7–23)
CALCIUM SERPL-MCNC: 9.2 MG/DL — SIGNIFICANT CHANGE UP (ref 8.4–10.5)
CHLORIDE SERPL-SCNC: 102 MMOL/L — SIGNIFICANT CHANGE UP (ref 98–107)
CO2 SERPL-SCNC: 22 MMOL/L — SIGNIFICANT CHANGE UP (ref 22–31)
CREAT SERPL-MCNC: 0.46 MG/DL — LOW (ref 0.5–1.3)
GLUCOSE SERPL-MCNC: 161 MG/DL — HIGH (ref 70–99)
HBA1C BLD-MCNC: 9.9 % — HIGH (ref 4–5.6)
HCT VFR BLD CALC: 37.2 % — SIGNIFICANT CHANGE UP (ref 34.5–45)
HGB BLD-MCNC: 11.1 G/DL — LOW (ref 11.5–15.5)
MCHC RBC-ENTMCNC: 24.2 PG — LOW (ref 27–34)
MCHC RBC-ENTMCNC: 29.8 % — LOW (ref 32–36)
MCV RBC AUTO: 81 FL — SIGNIFICANT CHANGE UP (ref 80–100)
NRBC # FLD: 0 K/UL — SIGNIFICANT CHANGE UP (ref 0–0)
PLATELET # BLD AUTO: 558 K/UL — HIGH (ref 150–400)
PMV BLD: 9.8 FL — SIGNIFICANT CHANGE UP (ref 7–13)
POTASSIUM SERPL-MCNC: 3.9 MMOL/L — SIGNIFICANT CHANGE UP (ref 3.5–5.3)
POTASSIUM SERPL-SCNC: 3.9 MMOL/L — SIGNIFICANT CHANGE UP (ref 3.5–5.3)
RBC # BLD: 4.59 M/UL — SIGNIFICANT CHANGE UP (ref 3.8–5.2)
RBC # FLD: 17.4 % — HIGH (ref 10.3–14.5)
RH IG SCN BLD-IMP: POSITIVE — SIGNIFICANT CHANGE UP
SODIUM SERPL-SCNC: 140 MMOL/L — SIGNIFICANT CHANGE UP (ref 135–145)
WBC # BLD: 7.87 K/UL — SIGNIFICANT CHANGE UP (ref 3.8–10.5)
WBC # FLD AUTO: 7.87 K/UL — SIGNIFICANT CHANGE UP (ref 3.8–10.5)

## 2020-08-12 PROCEDURE — 93010 ELECTROCARDIOGRAM REPORT: CPT

## 2020-08-12 RX ORDER — SODIUM CHLORIDE 9 MG/ML
1000 INJECTION, SOLUTION INTRAVENOUS
Refills: 0 | Status: DISCONTINUED | OUTPATIENT
Start: 2020-08-19 | End: 2020-08-19

## 2020-08-12 NOTE — H&P PST ADULT - NSICDXPROBLEM_GEN_ALL_CORE_FT
PROBLEM DIAGNOSES  Problem: Colon cancer  Assessment and Plan: This is a 53 y/o female who is scheduled for lap colectomy partial with anastomosis on 8-19-20  * Needs to call for an appointmet for covid testing. Any problems, to call surgery. To scheduled maximum 3 days before surgery    Problem: Diabetes mellitus  Assessment and Plan: Await medical evluation wt pcp due to h/o diabetes and major surgery  * Instructed to stop metformin 24 hours before surgery PROBLEM DIAGNOSES  Problem: Colon cancer  Assessment and Plan: This is a 53 y/o female who is scheduled for lap colectomy partial with anastomosis on 8-19-20  * Needs to call for an appointment for covid testing. Any problems, to call surgery. To scheduled maximum 3 days before surgery  * Given preop and cleanser instructions with good teach back and patient verbalized understanding    Problem: Diabetes mellitus  Assessment and Plan: Await medical evaluation wth pcp due to h/o diabetes and major surgery  * Instructed to stop metformin 24 hours before surgery  * Instructed to take normal am dose of gabapentin the am of surgery PROBLEM DIAGNOSES  Problem: Diabetes mellitus  Assessment and Plan: Await medical evaluation wt pcp due to h/o diabetes and major surgery  * Instructed to stop metformin 24 hours before surgery  * Instructed to take normal am dose of gabapentin the am of surgery    Problem: Colon cancer  Assessment and Plan: This is a 55 y/o female who is scheduled for lap colectomy partial with anastomosis on 8-19-20  * Needs to call for an appointment for covid testing. Any problems, to call surgeon. To schedule maximum 3 days before surgery  * Given preop and cleanser instructions with good teach back and patient verbalized understanding PROBLEM DIAGNOSES  Problem: Diabetes mellitus  Assessment and Plan: Await medical evaluation [facility] pcp due to h/o diabetes and major surgery--notified Surgeon's office and spoke with Jossy MAGALLANES   * Instructed to stop metformin 24 hours before surgery  * Instructed to take normal am dose of gabapentin the am of surgery    Problem: Colon cancer  Assessment and Plan: This is a 55 y/o female who is scheduled for lap colectomy partial with anastomosis on 8-19-20  * Needs to call for an appointment for covid testing. Any problems, to call surgeon. To schedule maximum 3 days before surgery  * Given preop and cleanser instructions with good teach back and patient verbalized understanding

## 2020-08-12 NOTE — H&P PST ADULT - NSICDXPASTMEDICALHX_GEN_ALL_CORE_FT
PAST MEDICAL HISTORY:  Diabetes mellitus type II, diagnosed in May 2020    Pain colon mass -- Patient states "cancer"    Perforated diverticulum PAST MEDICAL HISTORY:  Diabetes mellitus type II, diagnosed in May 2020    Nerve pain bottom of feet    Pain colon mass -- Patient states "cancer"    Perforated diverticulum

## 2020-08-18 ENCOUNTER — TRANSCRIPTION ENCOUNTER (OUTPATIENT)
Age: 55
End: 2020-08-18

## 2020-08-18 NOTE — ASU PATIENT PROFILE, ADULT - PMH
Diabetes mellitus  type II, diagnosed in May 2020  Nerve pain  bottom of feet  Pain  colon mass -- Patient states "cancer"  Perforated diverticulum

## 2020-08-19 ENCOUNTER — APPOINTMENT (OUTPATIENT)
Dept: COLORECTAL SURGERY | Facility: HOSPITAL | Age: 55
End: 2020-08-19
Payer: COMMERCIAL

## 2020-08-19 ENCOUNTER — INPATIENT (INPATIENT)
Facility: HOSPITAL | Age: 55
LOS: 2 days | Discharge: HOME | End: 2020-08-22
Attending: STUDENT IN AN ORGANIZED HEALTH CARE EDUCATION/TRAINING PROGRAM | Admitting: STUDENT IN AN ORGANIZED HEALTH CARE EDUCATION/TRAINING PROGRAM
Payer: COMMERCIAL

## 2020-08-19 ENCOUNTER — RESULT REVIEW (OUTPATIENT)
Age: 55
End: 2020-08-19

## 2020-08-19 VITALS
WEIGHT: 182.1 LBS | HEART RATE: 88 BPM | DIASTOLIC BLOOD PRESSURE: 79 MMHG | SYSTOLIC BLOOD PRESSURE: 138 MMHG | HEIGHT: 61 IN | RESPIRATION RATE: 18 BRPM | TEMPERATURE: 98 F | OXYGEN SATURATION: 99 %

## 2020-08-19 DIAGNOSIS — K63.5 POLYP OF COLON: ICD-10-CM

## 2020-08-19 DIAGNOSIS — R52 PAIN, UNSPECIFIED: Chronic | ICD-10-CM

## 2020-08-19 LAB
BASE EXCESS BLDA CALC-SCNC: 0.2 MMOL/L — SIGNIFICANT CHANGE UP
CA-I BLDA-SCNC: 1.16 MMOL/L — SIGNIFICANT CHANGE UP (ref 1.15–1.29)
GLUCOSE BLDA-MCNC: 195 MG/DL — HIGH (ref 70–99)
GLUCOSE BLDC GLUCOMTR-MCNC: 207 MG/DL — HIGH (ref 70–99)
GLUCOSE BLDC GLUCOMTR-MCNC: 274 MG/DL — HIGH (ref 70–99)
GLUCOSE BLDV-MCNC: 193 MG/DL — HIGH (ref 70–99)
HCG UR QL: NEGATIVE — SIGNIFICANT CHANGE UP
HCO3 BLDA-SCNC: 25 MMOL/L — SIGNIFICANT CHANGE UP (ref 22–26)
HCT VFR BLDA CALC: 30.6 % — LOW (ref 34.5–46.5)
HGB BLDA-MCNC: 9.9 G/DL — LOW (ref 11.5–15.5)
PCO2 BLDA: 43 MMHG — SIGNIFICANT CHANGE UP (ref 32–48)
PH BLDA: 7.37 PH — SIGNIFICANT CHANGE UP (ref 7.35–7.45)
PO2 BLDA: 256 MMHG — HIGH (ref 83–108)
POTASSIUM BLDA-SCNC: 3.5 MMOL/L — SIGNIFICANT CHANGE UP (ref 3.4–4.5)
SAO2 % BLDA: 97.7 % — SIGNIFICANT CHANGE UP (ref 95–99)
SODIUM BLDA-SCNC: 135 MMOL/L — LOW (ref 136–146)

## 2020-08-19 PROCEDURE — 88305 TISSUE EXAM BY PATHOLOGIST: CPT | Mod: 26

## 2020-08-19 PROCEDURE — 88309 TISSUE EXAM BY PATHOLOGIST: CPT | Mod: 26

## 2020-08-19 PROCEDURE — 45330 DIAGNOSTIC SIGMOIDOSCOPY: CPT

## 2020-08-19 PROCEDURE — 44213 LAP MOBIL SPLENIC FL ADD-ON: CPT

## 2020-08-19 PROCEDURE — 88342 IMHCHEM/IMCYTCHM 1ST ANTB: CPT | Mod: 26

## 2020-08-19 PROCEDURE — 88341 IMHCHEM/IMCYTCHM EA ADD ANTB: CPT | Mod: 26

## 2020-08-19 PROCEDURE — 88304 TISSUE EXAM BY PATHOLOGIST: CPT | Mod: 26

## 2020-08-19 PROCEDURE — 44207 L COLECTOMY/COLOPROCTOSTOMY: CPT

## 2020-08-19 RX ORDER — ACETAMINOPHEN 500 MG
1000 TABLET ORAL EVERY 6 HOURS
Refills: 0 | Status: COMPLETED | OUTPATIENT
Start: 2020-08-19 | End: 2020-08-20

## 2020-08-19 RX ORDER — METFORMIN HYDROCHLORIDE 850 MG/1
1 TABLET ORAL
Qty: 0 | Refills: 0 | DISCHARGE

## 2020-08-19 RX ORDER — DEXTROSE 50 % IN WATER 50 %
12.5 SYRINGE (ML) INTRAVENOUS ONCE
Refills: 0 | Status: DISCONTINUED | OUTPATIENT
Start: 2020-08-19 | End: 2020-08-22

## 2020-08-19 RX ORDER — NALOXONE HYDROCHLORIDE 4 MG/.1ML
0.1 SPRAY NASAL
Refills: 0 | Status: DISCONTINUED | OUTPATIENT
Start: 2020-08-19 | End: 2020-08-21

## 2020-08-19 RX ORDER — INSULIN LISPRO 100/ML
VIAL (ML) SUBCUTANEOUS
Refills: 0 | Status: DISCONTINUED | OUTPATIENT
Start: 2020-08-19 | End: 2020-08-22

## 2020-08-19 RX ORDER — ONDANSETRON 8 MG/1
4 TABLET, FILM COATED ORAL EVERY 6 HOURS
Refills: 0 | Status: DISCONTINUED | OUTPATIENT
Start: 2020-08-19 | End: 2020-08-21

## 2020-08-19 RX ORDER — MORPHINE SULFATE 50 MG/1
0.1 CAPSULE, EXTENDED RELEASE ORAL ONCE
Refills: 0 | Status: DISCONTINUED | OUTPATIENT
Start: 2020-08-19 | End: 2020-08-21

## 2020-08-19 RX ORDER — DEXTROSE 50 % IN WATER 50 %
15 SYRINGE (ML) INTRAVENOUS ONCE
Refills: 0 | Status: DISCONTINUED | OUTPATIENT
Start: 2020-08-19 | End: 2020-08-22

## 2020-08-19 RX ORDER — SODIUM CHLORIDE 9 MG/ML
1000 INJECTION, SOLUTION INTRAVENOUS
Refills: 0 | Status: DISCONTINUED | OUTPATIENT
Start: 2020-08-19 | End: 2020-08-21

## 2020-08-19 RX ORDER — SODIUM CHLORIDE 9 MG/ML
1000 INJECTION, SOLUTION INTRAVENOUS
Refills: 0 | Status: DISCONTINUED | OUTPATIENT
Start: 2020-08-19 | End: 2020-08-19

## 2020-08-19 RX ORDER — GABAPENTIN 400 MG/1
600 CAPSULE ORAL ONCE
Refills: 0 | Status: COMPLETED | OUTPATIENT
Start: 2020-08-19 | End: 2020-08-19

## 2020-08-19 RX ORDER — GLUCAGON INJECTION, SOLUTION 0.5 MG/.1ML
1 INJECTION, SOLUTION SUBCUTANEOUS ONCE
Refills: 0 | Status: DISCONTINUED | OUTPATIENT
Start: 2020-08-19 | End: 2020-08-22

## 2020-08-19 RX ORDER — DEXTROSE 50 % IN WATER 50 %
25 SYRINGE (ML) INTRAVENOUS ONCE
Refills: 0 | Status: DISCONTINUED | OUTPATIENT
Start: 2020-08-19 | End: 2020-08-22

## 2020-08-19 RX ORDER — CELECOXIB 200 MG/1
400 CAPSULE ORAL ONCE
Refills: 0 | Status: COMPLETED | OUTPATIENT
Start: 2020-08-19 | End: 2020-08-19

## 2020-08-19 RX ORDER — KETOROLAC TROMETHAMINE 30 MG/ML
15 SYRINGE (ML) INJECTION EVERY 6 HOURS
Refills: 0 | Status: DISCONTINUED | OUTPATIENT
Start: 2020-08-19 | End: 2020-08-20

## 2020-08-19 RX ORDER — INSULIN LISPRO 100/ML
6 VIAL (ML) SUBCUTANEOUS ONCE
Refills: 0 | Status: DISCONTINUED | OUTPATIENT
Start: 2020-08-19 | End: 2020-08-19

## 2020-08-19 RX ORDER — ONDANSETRON 8 MG/1
4 TABLET, FILM COATED ORAL ONCE
Refills: 0 | Status: DISCONTINUED | OUTPATIENT
Start: 2020-08-19 | End: 2020-08-20

## 2020-08-19 RX ORDER — OXYCODONE HYDROCHLORIDE 5 MG/1
10 TABLET ORAL EVERY 4 HOURS
Refills: 0 | Status: DISCONTINUED | OUTPATIENT
Start: 2020-08-19 | End: 2020-08-22

## 2020-08-19 RX ORDER — HEPARIN SODIUM 5000 [USP'U]/ML
5000 INJECTION INTRAVENOUS; SUBCUTANEOUS EVERY 8 HOURS
Refills: 0 | Status: DISCONTINUED | OUTPATIENT
Start: 2020-08-19 | End: 2020-08-22

## 2020-08-19 RX ORDER — HYDROMORPHONE HYDROCHLORIDE 2 MG/ML
0.5 INJECTION INTRAMUSCULAR; INTRAVENOUS; SUBCUTANEOUS
Refills: 0 | Status: DISCONTINUED | OUTPATIENT
Start: 2020-08-19 | End: 2020-08-20

## 2020-08-19 RX ORDER — GABAPENTIN 400 MG/1
1 CAPSULE ORAL
Qty: 0 | Refills: 0 | DISCHARGE

## 2020-08-19 RX ORDER — SODIUM CHLORIDE 9 MG/ML
1000 INJECTION, SOLUTION INTRAVENOUS
Refills: 0 | Status: DISCONTINUED | OUTPATIENT
Start: 2020-08-19 | End: 2020-08-22

## 2020-08-19 RX ORDER — OXYCODONE HYDROCHLORIDE 5 MG/1
5 TABLET ORAL EVERY 4 HOURS
Refills: 0 | Status: DISCONTINUED | OUTPATIENT
Start: 2020-08-19 | End: 2020-08-22

## 2020-08-19 RX ADMIN — Medication 4: at 23:00

## 2020-08-19 RX ADMIN — ONDANSETRON 4 MILLIGRAM(S): 8 TABLET, FILM COATED ORAL at 23:35

## 2020-08-19 RX ADMIN — SODIUM CHLORIDE 40 MILLILITER(S): 9 INJECTION, SOLUTION INTRAVENOUS at 21:34

## 2020-08-19 RX ADMIN — HEPARIN SODIUM 5000 UNIT(S): 5000 INJECTION INTRAVENOUS; SUBCUTANEOUS at 22:58

## 2020-08-19 RX ADMIN — CELECOXIB 400 MILLIGRAM(S): 200 CAPSULE ORAL at 13:38

## 2020-08-19 RX ADMIN — CELECOXIB 400 MILLIGRAM(S): 200 CAPSULE ORAL at 13:37

## 2020-08-19 RX ADMIN — GABAPENTIN 600 MILLIGRAM(S): 400 CAPSULE ORAL at 13:37

## 2020-08-19 NOTE — ASU PREOP CHECKLIST - SELECT TESTS ORDERED
EKG/Type and Screen/covid, glycosylated Hg/UCG/BMP/CBC/Type and Cross EKG/CBC/UCG/BMP/covid, glycosylated Hg/Type and Cross/Type and Screen/POCT Blood Glucose

## 2020-08-19 NOTE — BRIEF OPERATIVE NOTE - OPERATION/FINDINGS
Laparoscopic hand-assisted left colectomy, EEA, no Ucaths, splenic flexure mobilized. Left colic, left branch of middle colic taken.

## 2020-08-20 LAB
ANION GAP SERPL CALC-SCNC: 19 MMO/L — HIGH (ref 7–14)
BUN SERPL-MCNC: 5 MG/DL — LOW (ref 7–23)
CALCIUM SERPL-MCNC: 9 MG/DL — SIGNIFICANT CHANGE UP (ref 8.4–10.5)
CHLORIDE SERPL-SCNC: 94 MMOL/L — LOW (ref 98–107)
CO2 SERPL-SCNC: 14 MMOL/L — LOW (ref 22–31)
CREAT SERPL-MCNC: 0.45 MG/DL — LOW (ref 0.5–1.3)
GLUCOSE BLDC GLUCOMTR-MCNC: 119 MG/DL — HIGH (ref 70–99)
GLUCOSE BLDC GLUCOMTR-MCNC: 211 MG/DL — HIGH (ref 70–99)
GLUCOSE BLDC GLUCOMTR-MCNC: 246 MG/DL — HIGH (ref 70–99)
GLUCOSE BLDC GLUCOMTR-MCNC: 95 MG/DL — SIGNIFICANT CHANGE UP (ref 70–99)
GLUCOSE SERPL-MCNC: 220 MG/DL — HIGH (ref 70–99)
HCT VFR BLD CALC: 41 % — SIGNIFICANT CHANGE UP (ref 34.5–45)
HGB BLD-MCNC: 11.9 G/DL — SIGNIFICANT CHANGE UP (ref 11.5–15.5)
MAGNESIUM SERPL-MCNC: 1.6 MG/DL — SIGNIFICANT CHANGE UP (ref 1.6–2.6)
MCHC RBC-ENTMCNC: 24.5 PG — LOW (ref 27–34)
MCHC RBC-ENTMCNC: 29 % — LOW (ref 32–36)
MCV RBC AUTO: 84.4 FL — SIGNIFICANT CHANGE UP (ref 80–100)
NRBC # FLD: 0 K/UL — SIGNIFICANT CHANGE UP (ref 0–0)
PHOSPHATE SERPL-MCNC: 4.3 MG/DL — SIGNIFICANT CHANGE UP (ref 2.5–4.5)
PLATELET # BLD AUTO: 389 K/UL — SIGNIFICANT CHANGE UP (ref 150–400)
PMV BLD: 9.6 FL — SIGNIFICANT CHANGE UP (ref 7–13)
POTASSIUM SERPL-MCNC: 5.5 MMOL/L — HIGH (ref 3.5–5.3)
POTASSIUM SERPL-SCNC: 5.5 MMOL/L — HIGH (ref 3.5–5.3)
RBC # BLD: 4.86 M/UL — SIGNIFICANT CHANGE UP (ref 3.8–5.2)
RBC # FLD: 16.6 % — HIGH (ref 10.3–14.5)
SARS-COV-2 RNA SPEC QL NAA+PROBE: SIGNIFICANT CHANGE UP
SODIUM SERPL-SCNC: 127 MMOL/L — LOW (ref 135–145)
WBC # BLD: 14.46 K/UL — HIGH (ref 3.8–10.5)
WBC # FLD AUTO: 14.46 K/UL — HIGH (ref 3.8–10.5)

## 2020-08-20 RX ORDER — SODIUM CHLORIDE 9 MG/ML
500 INJECTION, SOLUTION INTRAVENOUS ONCE
Refills: 0 | Status: COMPLETED | OUTPATIENT
Start: 2020-08-20 | End: 2020-08-20

## 2020-08-20 RX ORDER — INSULIN LISPRO 100/ML
VIAL (ML) SUBCUTANEOUS AT BEDTIME
Refills: 0 | Status: DISCONTINUED | OUTPATIENT
Start: 2020-08-20 | End: 2020-08-22

## 2020-08-20 RX ORDER — ONDANSETRON 8 MG/1
4 TABLET, FILM COATED ORAL ONCE
Refills: 0 | Status: COMPLETED | OUTPATIENT
Start: 2020-08-20 | End: 2020-08-20

## 2020-08-20 RX ADMIN — Medication 1000 MILLIGRAM(S): at 22:25

## 2020-08-20 RX ADMIN — ONDANSETRON 4 MILLIGRAM(S): 8 TABLET, FILM COATED ORAL at 10:25

## 2020-08-20 RX ADMIN — HEPARIN SODIUM 5000 UNIT(S): 5000 INJECTION INTRAVENOUS; SUBCUTANEOUS at 05:51

## 2020-08-20 RX ADMIN — Medication 1000 MILLIGRAM(S): at 09:50

## 2020-08-20 RX ADMIN — Medication 15 MILLIGRAM(S): at 12:15

## 2020-08-20 RX ADMIN — Medication 15 MILLIGRAM(S): at 18:15

## 2020-08-20 RX ADMIN — Medication 400 MILLIGRAM(S): at 09:20

## 2020-08-20 RX ADMIN — Medication 4: at 09:21

## 2020-08-20 RX ADMIN — SODIUM CHLORIDE 500 MILLILITER(S): 9 INJECTION, SOLUTION INTRAVENOUS at 15:46

## 2020-08-20 RX ADMIN — Medication 1000 MILLIGRAM(S): at 14:35

## 2020-08-20 RX ADMIN — HEPARIN SODIUM 5000 UNIT(S): 5000 INJECTION INTRAVENOUS; SUBCUTANEOUS at 14:01

## 2020-08-20 RX ADMIN — Medication 400 MILLIGRAM(S): at 21:55

## 2020-08-20 RX ADMIN — SODIUM CHLORIDE 40 MILLILITER(S): 9 INJECTION, SOLUTION INTRAVENOUS at 09:14

## 2020-08-20 RX ADMIN — Medication 15 MILLIGRAM(S): at 17:45

## 2020-08-20 RX ADMIN — Medication 15 MILLIGRAM(S): at 01:36

## 2020-08-20 RX ADMIN — Medication 15 MILLIGRAM(S): at 05:52

## 2020-08-20 RX ADMIN — SODIUM CHLORIDE 40 MILLILITER(S): 9 INJECTION, SOLUTION INTRAVENOUS at 00:57

## 2020-08-20 RX ADMIN — Medication 15 MILLIGRAM(S): at 06:34

## 2020-08-20 RX ADMIN — Medication 15 MILLIGRAM(S): at 00:55

## 2020-08-20 RX ADMIN — Medication 400 MILLIGRAM(S): at 14:05

## 2020-08-20 RX ADMIN — ONDANSETRON 4 MILLIGRAM(S): 8 TABLET, FILM COATED ORAL at 07:45

## 2020-08-20 RX ADMIN — HEPARIN SODIUM 5000 UNIT(S): 5000 INJECTION INTRAVENOUS; SUBCUTANEOUS at 21:54

## 2020-08-20 RX ADMIN — Medication 1000 MILLIGRAM(S): at 02:03

## 2020-08-20 RX ADMIN — Medication 400 MILLIGRAM(S): at 01:48

## 2020-08-20 RX ADMIN — Medication 4: at 12:15

## 2020-08-20 RX ADMIN — Medication 15 MILLIGRAM(S): at 12:45

## 2020-08-20 NOTE — CHART NOTE - NSCHARTNOTEFT_GEN_A_CORE
CAPRINI SCORE     AGE RELATED RISK FACTORS                                                       MOBILITY RELATED FACTORS  [x ] Age 41-60 years                                            (1 Point)                  [ ] Bed rest                                                        (1 Point)  [ ] Age: 61-74 years                                           (2 Points)                 [ ] Plaster cast                                                   (2 Points)  [ ] Age= 75 years                                              (3 Points)                 [ ] Bed bound for more than 72 hours                 (2 Points)    DISEASE RELATED RISK FACTORS                                               GENDER SPECIFIC FACTORS  [ ] Edema in the lower extremities                       (1 Point)                  [ ] Pregnancy                                                     (1 Point)  [ ] Varicose veins                                               (1 Point)                  [ ] Post-partum < 6 weeks                                   (1 Point)             [x ] BMI > 25 Kg/m2                                            (1 Point)                  [ ] Hormonal therapy  or oral contraception          (1 Point)                 [ ] Sepsis (in the previous month)                        (1 Point)                  [ ] History of pregnancy complications                 (1 point)  [ ] Pneumonia or serious lung disease                                               [ ] Unexplained or recurrent                     (1 Point)           (in the previous month)                               (1 Point)  [ ] Abnormal pulmonary function test                     (1 Point)                 SURGERY RELATED RISK FACTORS  [ ] Acute myocardial infarction                              (1 Point)                 [ ]  Section                                             (1 Point)  [ ] Congestive heart failure (in the previous month)  (1 Point)               [ ] Minor surgery                                                  (1 Point)   [ ] Inflammatory bowel disease                             (1 Point)                 [ ] Arthroscopic surgery                                        (2 Points)  [ ] Central venous access                                      (2 Points)                [x] General surgery lasting more than 45 minutes   (2 Points)       [ ] Stroke (in the previous month)                          (5 Points)               [ ] Elective arthroplasty                                         (5 Points)            [x] Present or previous malignancy                        (2 points)                                                                                                                                      HEMATOLOGY RELATED FACTORS                                                 TRAUMA RELATED RISK FACTORS  [ ] Prior episodes of VTE                                     (3 Points)                [ ] Fracture of the hip, pelvis, or leg                       (5 Points)  [ ] Positive family history for VTE                         (3 Points)                 [ ] Acute spinal cord injury (in the previous month)  (5 Points)  [ ] Prothrombin 20650 A                                     (3 Points)                 [ ] Paralysis  (less than 1 month)                             (5 Points)  [ ] Factor V Leiden                                             (3 Points)                  [ ] Multiple Trauma within 1 month                        (5 Points)  [ ] Lupus anticoagulants                                     (3 Points)                                                           [ ] Anticardiolipin antibodies                               (3 Points)                                                       [ ] High homocysteine in the blood                      (3 Points)                                             [ ] Other congenital or acquired thrombophilia      (3 Points)                                                [ ] Heparin induced thrombocytopenia                  (3 Points)                                          Total Score [  6  ]    Caprini Score 0 - 2:  Low Risk, No VTE Prophylaxis required for most patients, encourage ambulation  Caprini Score 3 - 6:  At Risk, pharmacologic VTE prophylaxis is indicated for most patients (in the absence of a contraindication)  Caprini Score Greater than or = 7:  High Risk, pharmacologic VTE prophylaxis is indicated for most patients (in the absence of a contraindication)

## 2020-08-20 NOTE — PROGRESS NOTE ADULT - ASSESSMENT
54F PMH DM, prior episode of perforated diverticulitis treated with IV abx and drainage, now with evidence of LBO with focal thickening at descending colon, most likely 2/2 malignancy. s/p colonoscopy 7/30 with colon stent placement, now s/p hand assisted laparoscopic left colectomy on 8/19, recovering on surgical floor.    PLAN:  -CLD/IVF  -Advance diet to regular later today  -Pain control  -OOB as tolerated  -D/C Martinez  -DVT ppx: Heparin SubQ    A Surgery  [id number]

## 2020-08-20 NOTE — CHART NOTE - NSCHARTNOTEFT_GEN_A_CORE
Patient underwent multiple failed attempted at lab draws. Will allow patient time and attempt to draw blood in AM with US guidance if required.

## 2020-08-20 NOTE — PROGRESS NOTE ADULT - ASSESSMENT
54F PMH DM, prior episode of perforated diverticulitis treated with IV abx and drainage, now with evidence of LBO with focal thickening at descending colon, most likely 2/2 malignancy. s/p colonoscopy 7/30 with colon stent placement, now s/p hand assisted laparoscopic left colectomy on 8/19, recovering on surgical floor.    PLAN:  -ERAS protocol  -CLD  - 54F PMH DM, prior episode of perforated diverticulitis treated with IV abx and drainage, now with evidence of LBO with focal thickening at descending colon, most likely 2/2 malignancy. s/p colonoscopy 7/30 with colon stent placement, now s/p hand assisted laparoscopic left colectomy on 8/19, recovering on surgical floor.    PLAN:  -ERAS protocol  -CLD/IVF  -Pain control  -OOB as tolerated  -Martinez  -DVT ppx: Heparin SubQ    A Surgery  [id number]

## 2020-08-21 LAB
ANION GAP SERPL CALC-SCNC: 11 MMO/L — SIGNIFICANT CHANGE UP (ref 7–14)
BUN SERPL-MCNC: 5 MG/DL — LOW (ref 7–23)
CALCIUM SERPL-MCNC: 8.7 MG/DL — SIGNIFICANT CHANGE UP (ref 8.4–10.5)
CHLORIDE SERPL-SCNC: 102 MMOL/L — SIGNIFICANT CHANGE UP (ref 98–107)
CO2 SERPL-SCNC: 24 MMOL/L — SIGNIFICANT CHANGE UP (ref 22–31)
CREAT SERPL-MCNC: 0.66 MG/DL — SIGNIFICANT CHANGE UP (ref 0.5–1.3)
GLUCOSE BLDC GLUCOMTR-MCNC: 117 MG/DL — HIGH (ref 70–99)
GLUCOSE BLDC GLUCOMTR-MCNC: 123 MG/DL — HIGH (ref 70–99)
GLUCOSE BLDC GLUCOMTR-MCNC: 129 MG/DL — HIGH (ref 70–99)
GLUCOSE BLDC GLUCOMTR-MCNC: 142 MG/DL — HIGH (ref 70–99)
GLUCOSE SERPL-MCNC: 114 MG/DL — HIGH (ref 70–99)
HCT VFR BLD CALC: 35.3 % — SIGNIFICANT CHANGE UP (ref 34.5–45)
HGB BLD-MCNC: 10.8 G/DL — LOW (ref 11.5–15.5)
MAGNESIUM SERPL-MCNC: 1.6 MG/DL — SIGNIFICANT CHANGE UP (ref 1.6–2.6)
MCHC RBC-ENTMCNC: 24.4 PG — LOW (ref 27–34)
MCHC RBC-ENTMCNC: 30.6 % — LOW (ref 32–36)
MCV RBC AUTO: 79.7 FL — LOW (ref 80–100)
NRBC # FLD: 0 K/UL — SIGNIFICANT CHANGE UP (ref 0–0)
PHOSPHATE SERPL-MCNC: 3.1 MG/DL — SIGNIFICANT CHANGE UP (ref 2.5–4.5)
PLATELET # BLD AUTO: 401 K/UL — HIGH (ref 150–400)
PMV BLD: 9.6 FL — SIGNIFICANT CHANGE UP (ref 7–13)
POTASSIUM SERPL-MCNC: 3.7 MMOL/L — SIGNIFICANT CHANGE UP (ref 3.5–5.3)
POTASSIUM SERPL-SCNC: 3.7 MMOL/L — SIGNIFICANT CHANGE UP (ref 3.5–5.3)
RBC # BLD: 4.43 M/UL — SIGNIFICANT CHANGE UP (ref 3.8–5.2)
RBC # FLD: 17 % — HIGH (ref 10.3–14.5)
SODIUM SERPL-SCNC: 137 MMOL/L — SIGNIFICANT CHANGE UP (ref 135–145)
WBC # BLD: 11.18 K/UL — HIGH (ref 3.8–10.5)
WBC # FLD AUTO: 11.18 K/UL — HIGH (ref 3.8–10.5)

## 2020-08-21 RX ORDER — SODIUM CHLORIDE 9 MG/ML
3 INJECTION INTRAMUSCULAR; INTRAVENOUS; SUBCUTANEOUS EVERY 8 HOURS
Refills: 0 | Status: DISCONTINUED | OUTPATIENT
Start: 2020-08-21 | End: 2020-08-22

## 2020-08-21 RX ORDER — ACETAMINOPHEN 500 MG
1000 TABLET ORAL ONCE
Refills: 0 | Status: COMPLETED | OUTPATIENT
Start: 2020-08-21 | End: 2020-08-21

## 2020-08-21 RX ORDER — MAGNESIUM SULFATE 500 MG/ML
2 VIAL (ML) INJECTION ONCE
Refills: 0 | Status: COMPLETED | OUTPATIENT
Start: 2020-08-21 | End: 2020-08-21

## 2020-08-21 RX ORDER — ACETAMINOPHEN 500 MG
975 TABLET ORAL EVERY 6 HOURS
Refills: 0 | Status: DISCONTINUED | OUTPATIENT
Start: 2020-08-21 | End: 2020-08-22

## 2020-08-21 RX ADMIN — HEPARIN SODIUM 5000 UNIT(S): 5000 INJECTION INTRAVENOUS; SUBCUTANEOUS at 13:50

## 2020-08-21 RX ADMIN — Medication 975 MILLIGRAM(S): at 10:45

## 2020-08-21 RX ADMIN — SODIUM CHLORIDE 3 MILLILITER(S): 9 INJECTION INTRAMUSCULAR; INTRAVENOUS; SUBCUTANEOUS at 21:04

## 2020-08-21 RX ADMIN — Medication 975 MILLIGRAM(S): at 19:07

## 2020-08-21 RX ADMIN — HEPARIN SODIUM 5000 UNIT(S): 5000 INJECTION INTRAVENOUS; SUBCUTANEOUS at 21:06

## 2020-08-21 RX ADMIN — Medication 400 MILLIGRAM(S): at 04:00

## 2020-08-21 RX ADMIN — Medication 50 GRAM(S): at 11:46

## 2020-08-21 RX ADMIN — HEPARIN SODIUM 5000 UNIT(S): 5000 INJECTION INTRAVENOUS; SUBCUTANEOUS at 06:31

## 2020-08-21 RX ADMIN — Medication 975 MILLIGRAM(S): at 19:37

## 2020-08-21 RX ADMIN — Medication 975 MILLIGRAM(S): at 10:15

## 2020-08-21 RX ADMIN — SODIUM CHLORIDE 3 MILLILITER(S): 9 INJECTION INTRAMUSCULAR; INTRAVENOUS; SUBCUTANEOUS at 15:04

## 2020-08-21 NOTE — PROGRESS NOTE ADULT - ASSESSMENT
54F PMH DM, prior episode of perforated diverticulitis treated with IV abx and drainage, now with evidence of LBO with focal thickening at descending colon, most likely 2/2 malignancy. s/p colonoscopy 7/30 with colon stent placement, now s/p hand assisted laparoscopic left colectomy on 8/19, recovering on surgical floor. Patient tolerating pain on current regimen and passing gas. Will likely advance diet during morning.     PLAN:  -ERAS protocol  -CLD/IVF  -Pain control  -OOB as tolerated  -DVT ppx: Heparin SubQ    A Surgery  [id number]

## 2020-08-22 ENCOUNTER — TRANSCRIPTION ENCOUNTER (OUTPATIENT)
Age: 55
End: 2020-08-22

## 2020-08-22 VITALS
RESPIRATION RATE: 17 BRPM | SYSTOLIC BLOOD PRESSURE: 132 MMHG | DIASTOLIC BLOOD PRESSURE: 76 MMHG | OXYGEN SATURATION: 99 % | TEMPERATURE: 98 F | HEART RATE: 87 BPM

## 2020-08-22 LAB
ANION GAP SERPL CALC-SCNC: 13 MMO/L — SIGNIFICANT CHANGE UP (ref 7–14)
BUN SERPL-MCNC: 6 MG/DL — LOW (ref 7–23)
CALCIUM SERPL-MCNC: 8.6 MG/DL — SIGNIFICANT CHANGE UP (ref 8.4–10.5)
CHLORIDE SERPL-SCNC: 105 MMOL/L — SIGNIFICANT CHANGE UP (ref 98–107)
CO2 SERPL-SCNC: 23 MMOL/L — SIGNIFICANT CHANGE UP (ref 22–31)
CREAT SERPL-MCNC: 0.48 MG/DL — LOW (ref 0.5–1.3)
GLUCOSE BLDC GLUCOMTR-MCNC: 127 MG/DL — HIGH (ref 70–99)
GLUCOSE SERPL-MCNC: 121 MG/DL — HIGH (ref 70–99)
HCT VFR BLD CALC: 35.2 % — SIGNIFICANT CHANGE UP (ref 34.5–45)
HGB BLD-MCNC: 10.5 G/DL — LOW (ref 11.5–15.5)
MAGNESIUM SERPL-MCNC: 2.1 MG/DL — SIGNIFICANT CHANGE UP (ref 1.6–2.6)
MCHC RBC-ENTMCNC: 24.6 PG — LOW (ref 27–34)
MCHC RBC-ENTMCNC: 29.8 % — LOW (ref 32–36)
MCV RBC AUTO: 82.4 FL — SIGNIFICANT CHANGE UP (ref 80–100)
NRBC # FLD: 0 K/UL — SIGNIFICANT CHANGE UP (ref 0–0)
PHOSPHATE SERPL-MCNC: 2.5 MG/DL — SIGNIFICANT CHANGE UP (ref 2.5–4.5)
PLATELET # BLD AUTO: 441 K/UL — HIGH (ref 150–400)
PMV BLD: 10 FL — SIGNIFICANT CHANGE UP (ref 7–13)
POTASSIUM SERPL-MCNC: 3.8 MMOL/L — SIGNIFICANT CHANGE UP (ref 3.5–5.3)
POTASSIUM SERPL-SCNC: 3.8 MMOL/L — SIGNIFICANT CHANGE UP (ref 3.5–5.3)
RBC # BLD: 4.27 M/UL — SIGNIFICANT CHANGE UP (ref 3.8–5.2)
RBC # FLD: 17.1 % — HIGH (ref 10.3–14.5)
SODIUM SERPL-SCNC: 141 MMOL/L — SIGNIFICANT CHANGE UP (ref 135–145)
WBC # BLD: 9.99 K/UL — SIGNIFICANT CHANGE UP (ref 3.8–10.5)
WBC # FLD AUTO: 9.99 K/UL — SIGNIFICANT CHANGE UP (ref 3.8–10.5)

## 2020-08-22 RX ORDER — OXYCODONE HYDROCHLORIDE 5 MG/1
1 TABLET ORAL
Qty: 10 | Refills: 0
Start: 2020-08-22

## 2020-08-22 RX ADMIN — Medication 975 MILLIGRAM(S): at 02:16

## 2020-08-22 RX ADMIN — HEPARIN SODIUM 5000 UNIT(S): 5000 INJECTION INTRAVENOUS; SUBCUTANEOUS at 05:40

## 2020-08-22 RX ADMIN — Medication 975 MILLIGRAM(S): at 01:16

## 2020-08-22 RX ADMIN — SODIUM CHLORIDE 3 MILLILITER(S): 9 INJECTION INTRAMUSCULAR; INTRAVENOUS; SUBCUTANEOUS at 05:39

## 2020-08-22 NOTE — PROGRESS NOTE ADULT - ASSESSMENT
54F PMH DM, prior episode of perforated diverticulitis treated with IV abx and drainage, now with evidence of LBO with focal thickening at descending colon, most likely 2/2 malignancy. s/p colonoscopy 7/30 with colon stent placement, now s/p hand assisted laparoscopic left colectomy on 8/19, recovering on surgical floor. Patient tolerating pain on current regimen and passing gas. Patient tolerating diet and passing gas. Will discharge to home when patient transport is ready in AM .     PLAN:  -Advanced as tolerated  -Pain control with oral pain medications  - Discharge to home in AM   -OOB as tolerated  -DVT ppx: Heparin SubQ    A Surgery  [id number]

## 2020-08-22 NOTE — DISCHARGE NOTE PROVIDER - CARE PROVIDER_API CALL
Jony Barbour  COLON/RECTAL SURGERY  [facility], [address] [place] [id number]  Phone: [phone]  Fax: [phone]  Follow Up Time: 1 week

## 2020-08-22 NOTE — DISCHARGE NOTE PROVIDER - HOSPITAL COURSE
Patient is a 54 year old female presenting to the hospital for scheduled laparoscopic left colectomy for resection of colonic mass. Patient was taken to OR and underwent a hand assisted laparoscopic colectomy without any complications. The patient tolerated the procedure well. There were no complications. The patient was extubated in the OR and transferred to the PACU in stable condition and transferred to the floor. The patient had daily wound care and was seen by physical therapy which recommended discharge to home.  Once bowel function returned, diet was advanced as tolerated. The patient's pain was controlled by IV pain medications and then by PO pain medications. The patient was placed back on home medications. At the time of discharge, the patient was hemodynamically stable, tolerating PO diet, voiding urine,  ambulating and was comfortable with adequate pain control. The patient was instructed to follow up with Dr. Barbour within 1 week after discharge. The patient felt comfortable with discharge and was discharged to home. The patient had no other issues and converning symptoms for which to reach out to clinic or to ED were discussed with patient before departure.

## 2020-08-22 NOTE — DISCHARGE NOTE PROVIDER - NSDCFUADDINST_GEN_ALL_CORE_FT
WOUND CARE:  Please keep incisions clean and dry. Please do not Scrub or rub incisions. Do not use lotion or powder on incisions  BATHING: Please do not submerge wound underwater. You may shower and/or sponge bathe.  ACTIVITY: No heavy lifting or straining. Otherwise, you may return to your usual level of physical activity. If you are taking narcotic pain medication (such as Percocet) DO NOT drive a car, operate machinery or make important decisions.  DIET: Return to your usual diet.  NOTIFY YOUR SURGEON IF: You have any bleeding that does not stop, any pus draining from your wound(s), any fever (over 100.4 F) or chills, persistent nausea/vomiting, persistent diarrhea, or if your pain is not controlled on your discharge pain medications.  FOLLOW-UP: Please follow up with your primary care physician in one week regarding your hospitalization.  Please follow up with your surgeon, Dr. Barbour in 1 week. WOUND CARE:  Please keep incisions clean and dry. Please do not Scrub or rub incisions. Do not use lotion or powder on incisions  BATHING: Please do not submerge wound underwater. You may shower and/or sponge bathe.  ACTIVITY: No heavy lifting or straining. Otherwise, you may return to your usual level of physical activity. If you are taking narcotic pain medication (such as Percocet) DO NOT drive a car, operate machinery or make important decisions.  MEDS: Please resume home medications.  You may take tylenol/motrin alternating for pain.  You were also prescribed oxycodone, you may take this as needed for severe pain.   DIET: Return to your usual diet.  NOTIFY YOUR SURGEON IF: You have any bleeding that does not stop, any pus draining from your wound(s), any fever (over 100.4 F) or chills, persistent nausea/vomiting, persistent diarrhea, or if your pain is not controlled on your discharge pain medications.  FOLLOW-UP: Please follow up with your primary care physician in one week regarding your hospitalization.  Please follow up with your surgeon, Dr. Barbour in 1 week.

## 2020-08-22 NOTE — DISCHARGE NOTE NURSING/CASE MANAGEMENT/SOCIAL WORK - PATIENT PORTAL LINK FT
You can access the FollowMyHealth Patient Portal offered by [facility] by registering at the following website: [email or web address]. By joining [facility]’s FollowMyHealth portal, you will also be able to view your health information using other applications (apps) compatible with our system.

## 2020-08-22 NOTE — DISCHARGE NOTE NURSING/CASE MANAGEMENT/SOCIAL WORK - NSDCPNINST_GEN_ALL_CORE
Please NOTIFY MD for any of the following s/s: S/S infection (Fever >100.4, chills, increased redness, increased bleeding, pus-like drainage from incision line), uncontrolled pain not relieved by pain medications, persistent nausea/vomiting or inability to tolerate diet. No heavy lifting; No driving while taking narcotic pain medications.

## 2020-08-22 NOTE — DISCHARGE NOTE PROVIDER - NSDCCPCAREPLAN_GEN_ALL_CORE_FT
PRINCIPAL DISCHARGE DIAGNOSIS  Diagnosis: Colon cancer  Assessment and Plan of Treatment: s/ hand assisted lap left colectomy

## 2020-08-22 NOTE — PROGRESS NOTE ADULT - SUBJECTIVE AND OBJECTIVE BOX
ANESTHESIA POSTOP CHECK    54y Female POSTOP DAY 1 S/P Lap Left Colectomy     Vital Signs Last 24 Hrs  T(C): 36.7 (20 Aug 2020 10:49), Max: 36.7 (20 Aug 2020 00:26)  T(F): 98.1 (20 Aug 2020 10:49), Max: 98.1 (20 Aug 2020 10:49)  HR: 73 (20 Aug 2020 10:49) (73 - 99)  BP: 101/64 (20 Aug 2020 10:49) (101/64 - 138/72)  BP(mean): 82 (19 Aug 2020 22:45) (75 - 99)  RR: 17 (20 Aug 2020 10:49) (17 - 22)  SpO2: 100% (20 Aug 2020 10:49) (90% - 100%)  I&O's Summary    19 Aug 2020 07:01  -  20 Aug 2020 07:00  --------------------------------------------------------  IN: 400 mL / OUT: 750 mL / NET: -350 mL        [X ] NO APPARENT ANESTHESIA COMPLICATIONS      Comments:
Anesthesia Pain Management Service    SUBJECTIVE: Patient s/p spinal morphine with pain managable and no problems.  Pain Scale Score: 2/10 Refer to charted pain scores    THERAPY:    s/p spinal PF morphine yesterday.      MEDICATIONS  (STANDING):  acetaminophen  IVPB .. 1000 milliGRAM(s) IV Intermittent every 6 hours  dextrose 5%. 1000 milliLiter(s) (50 mL/Hr) IV Continuous <Continuous>  dextrose 50% Injectable 12.5 Gram(s) IV Push once  dextrose 50% Injectable 25 Gram(s) IV Push once  dextrose 50% Injectable 25 Gram(s) IV Push once  heparin   Injectable 5000 Unit(s) SubCutaneous every 8 hours  insulin lispro (HumaLOG) corrective regimen sliding scale   SubCutaneous three times a day before meals  ketorolac   Injectable 15 milliGRAM(s) IV Push every 6 hours  lactated ringers. 1000 milliLiter(s) (40 mL/Hr) IV Continuous <Continuous>  morphine PF Spinal 0.1 milliGRAM(s) IntraThecal. once  ondansetron Injectable 4 milliGRAM(s) IV Push once    MEDICATIONS  (PRN):  dextrose 40% Gel 15 Gram(s) Oral once PRN Blood Glucose LESS THAN 70 milliGRAM(s)/deciliter  glucagon  Injectable 1 milliGRAM(s) IntraMuscular once PRN Glucose LESS THAN 70 milligrams/deciliter  naloxone Injectable 0.1 milliGRAM(s) IV Push every 3 minutes PRN For ANY of the following changes in patient status:  A. RR LESS THAN 10 breaths per minute, B. Oxygen saturation LESS THAN 90%, C. Sedation score of 6  ondansetron Injectable 4 milliGRAM(s) IV Push every 6 hours PRN Nausea  oxyCODONE    IR 5 milliGRAM(s) Oral every 4 hours PRN Moderate Pain (4 - 6)  oxyCODONE    IR 10 milliGRAM(s) Oral every 4 hours PRN Severe Pain (7 - 10)      OBJECTIVE: Patient sitting up in bed.    Sedation Score:	[ x] Alert	[ ] Drowsy	[ ] Arousable	[ ] Asleep	[ ] Unresponsive    Side Effects:	[ x] None	[ ] Nausea	[ ] Vomiting	[ ] Pruritus  		  [ ] Weakness		[ ] Numbness	[ ] Other:    Vital Signs Last 24 Hrs  T(C): 36.6 (20 Aug 2020 05:53), Max: 36.9 (19 Aug 2020 12:21)  T(F): 97.8 (20 Aug 2020 05:53), Max: 98.4 (19 Aug 2020 12:21)  HR: 78 (20 Aug 2020 05:53) (78 - 99)  BP: 127/74 (20 Aug 2020 05:53) (109/72 - 138/79)  BP(mean): 82 (19 Aug 2020 22:45) (75 - 99)  RR: 18 (20 Aug 2020 05:53) (18 - 22)  SpO2: 98% (20 Aug 2020 05:53) (90% - 100%)    ASSESSMENT/ PLAN  [x ] Patient transitioned to prn analgesics  [x] Pain management per primary service, pain service to sign off   [x]Documentation and Verification of current medications     Comments: PRN Oral/IV opioids and/or non-opioid Adjuvant analgesics to be used at this point.    Progress Note written now but Patient was seen earlier.
Daily AM Progress Note A Team Surgery      Vital Signs Last 24 Hrs  T(C): 36.6 (20 Aug 2020 05:53), Max: 36.9 (19 Aug 2020 12:21)  T(F): 97.8 (20 Aug 2020 05:53), Max: 98.4 (19 Aug 2020 12:21)  HR: 78 (20 Aug 2020 05:53) (78 - 99)  BP: 127/74 (20 Aug 2020 05:53) (109/72 - 138/79)  BP(mean): 82 (19 Aug 2020 22:45) (75 - 99)  RR: 18 (20 Aug 2020 05:53) (18 - 22)  SpO2: 98% (20 Aug 2020 05:53) (90% - 100%)      SUBJECTIVE: Patient seen and examined by team on morning rounds. Patient lying comfortably in bed. Her pain is controlled.  Denies nausea/vomiting, flatus, BM.  Has not ambulated.  Martinez in place with adequate urine output.    General Appearance: Appears well, NAD  Neck: Supple  Chest: Equal expansion bilaterally, equal breath sounds  CV: Pulse regular presently  Abdomen: soft mildly distended, appropriately tender to palpation at incision site, incisions dressing c/d/i  Extremities: Grossly symmetric, SCD's in place     I&O's Summary    19 Aug 2020 07:01  -  20 Aug 2020 07:00  --------------------------------------------------------  IN: 400 mL / OUT: 750 mL / NET: -350 mL      I&O's Detail    19 Aug 2020 07:01  -  20 Aug 2020 07:00  --------------------------------------------------------  IN:    lactated ringers.: 160 mL    Oral Fluid: 240 mL  Total IN: 400 mL    OUT:    Indwelling Catheter - Urethral: 750 mL  Total OUT: 750 mL    Total NET: -350 mL          MEDICATIONS  (STANDING):  acetaminophen  IVPB .. 1000 milliGRAM(s) IV Intermittent every 6 hours  dextrose 5%. 1000 milliLiter(s) (50 mL/Hr) IV Continuous <Continuous>  dextrose 50% Injectable 12.5 Gram(s) IV Push once  dextrose 50% Injectable 25 Gram(s) IV Push once  dextrose 50% Injectable 25 Gram(s) IV Push once  heparin   Injectable 5000 Unit(s) SubCutaneous every 8 hours  insulin lispro (HumaLOG) corrective regimen sliding scale   SubCutaneous three times a day before meals  ketorolac   Injectable 15 milliGRAM(s) IV Push every 6 hours  lactated ringers. 1000 milliLiter(s) (40 mL/Hr) IV Continuous <Continuous>  morphine PF Spinal 0.1 milliGRAM(s) IntraThecal. once    MEDICATIONS  (PRN):  dextrose 40% Gel 15 Gram(s) Oral once PRN Blood Glucose LESS THAN 70 milliGRAM(s)/deciliter  glucagon  Injectable 1 milliGRAM(s) IntraMuscular once PRN Glucose LESS THAN 70 milligrams/deciliter  naloxone Injectable 0.1 milliGRAM(s) IV Push every 3 minutes PRN For ANY of the following changes in patient status:  A. RR LESS THAN 10 breaths per minute, B. Oxygen saturation LESS THAN 90%, C. Sedation score of 6  ondansetron Injectable 4 milliGRAM(s) IV Push every 6 hours PRN Nausea  oxyCODONE    IR 5 milliGRAM(s) Oral every 4 hours PRN Moderate Pain (4 - 6)  oxyCODONE    IR 10 milliGRAM(s) Oral every 4 hours PRN Severe Pain (7 - 10)      LABS:                RADIOLOGY & ADDITIONAL STUDIES:
GENERAL SURGERY POST-OP NOTE:     Status post:  Hand assisted laparoscopic left colectomy    Subjective: Patient was evaluated following her procedure.  She states she is tired, and keeps her eyes shut throughout our conversation.  Her pain is controlled.  Denies nausea/vomiting.  Denies flatus/BM.  Has not ambulated.  Martinez draining clear yellow urine.       Objective:    MEDICATIONS  (STANDING):  acetaminophen  IVPB .. 1000 milliGRAM(s) IV Intermittent every 6 hours  dextrose 5%. 1000 milliLiter(s) (50 mL/Hr) IV Continuous <Continuous>  dextrose 50% Injectable 12.5 Gram(s) IV Push once  dextrose 50% Injectable 25 Gram(s) IV Push once  dextrose 50% Injectable 25 Gram(s) IV Push once  heparin   Injectable 5000 Unit(s) SubCutaneous every 8 hours  insulin lispro (HumaLOG) corrective regimen sliding scale   SubCutaneous three times a day before meals  ketorolac   Injectable 15 milliGRAM(s) IV Push every 6 hours  lactated ringers. 1000 milliLiter(s) (40 mL/Hr) IV Continuous <Continuous>  morphine PF Spinal 0.1 milliGRAM(s) IntraThecal. once    MEDICATIONS  (PRN):  dextrose 40% Gel 15 Gram(s) Oral once PRN Blood Glucose LESS THAN 70 milliGRAM(s)/deciliter  glucagon  Injectable 1 milliGRAM(s) IntraMuscular once PRN Glucose LESS THAN 70 milligrams/deciliter  naloxone Injectable 0.1 milliGRAM(s) IV Push every 3 minutes PRN For ANY of the following changes in patient status:  A. RR LESS THAN 10 breaths per minute, B. Oxygen saturation LESS THAN 90%, C. Sedation score of 6  ondansetron Injectable 4 milliGRAM(s) IV Push every 6 hours PRN Nausea  oxyCODONE    IR 5 milliGRAM(s) Oral every 4 hours PRN Moderate Pain (4 - 6)  oxyCODONE    IR 10 milliGRAM(s) Oral every 4 hours PRN Severe Pain (7 - 10)      Vital Signs Last 24 Hrs  T(C): 36.7 (20 Aug 2020 00:26), Max: 36.9 (19 Aug 2020 12:21)  T(F): 98 (20 Aug 2020 00:26), Max: 98.4 (19 Aug 2020 12:21)  HR: 78 (20 Aug 2020 00:26) (78 - 99)  BP: 138/72 (20 Aug 2020 00:26) (109/72 - 138/79)  BP(mean): 82 (19 Aug 2020 22:45) (75 - 99)  RR: 18 (20 Aug 2020 00:26) (18 - 22)  SpO2: 99% (20 Aug 2020 00:26) (90% - 99%)    I&O's Detail    19 Aug 2020 07:01  -  20 Aug 2020 01:18  --------------------------------------------------------  IN:    lactated ringers.: 160 mL  Total IN: 160 mL    OUT:    Indwelling Catheter - Urethral: 330 mL  Total OUT: 330 mL    Total NET: -170 mL          Daily Height in cm: 154.94 (19 Aug 2020 12:21)    Daily     LABS:                PHYSICAL EXAM:  Gen: NAD, laying comfortably asleep in bed  Resp: no increased work of breathing, on 1L NC  Abd: soft mildly distended, appropriately tender to palpation, however very minimal, no rebound or guarding, incisions dressed c/d/i  Ext: warm and well perfused, grossly symmetric
GENERAL SURGERY PROGRESS NOTE TEAM A  ----------------------------------------------------------------------------------    IRENE DONALDSON  |  [id number]   |   54y  |    Female   |   [id number]   |    Admit:  08-19-20 (2d)    Attending: Jony Barbour      Patient is a 54y old  Female who presents for colon surgery (12 Aug 2020 09:09)      ----------------------------------------------------------------------------------    SUBJECTIVE:   Patient seen today during morning rounds at bedside and without acute distress. Patient passed her trial of void and required IV tylenol overnight. Passed gas.     Overnight: None    OBJECTIVE:  MEDICATIONS  (STANDING):  dextrose 5%. 1000 milliLiter(s) (50 mL/Hr) IV Continuous <Continuous>  dextrose 50% Injectable 12.5 Gram(s) IV Push once  dextrose 50% Injectable 25 Gram(s) IV Push once  dextrose 50% Injectable 25 Gram(s) IV Push once  heparin   Injectable 5000 Unit(s) SubCutaneous every 8 hours  insulin lispro (HumaLOG) corrective regimen sliding scale   SubCutaneous three times a day before meals  insulin lispro (HumaLOG) corrective regimen sliding scale   SubCutaneous at bedtime  lactated ringers. 1000 milliLiter(s) (40 mL/Hr) IV Continuous <Continuous>  morphine PF Spinal 0.1 milliGRAM(s) IntraThecal. once    MEDICATIONS  (PRN):  dextrose 40% Gel 15 Gram(s) Oral once PRN Blood Glucose LESS THAN 70 milliGRAM(s)/deciliter  glucagon  Injectable 1 milliGRAM(s) IntraMuscular once PRN Glucose LESS THAN 70 milligrams/deciliter  naloxone Injectable 0.1 milliGRAM(s) IV Push every 3 minutes PRN For ANY of the following changes in patient status:  A. RR LESS THAN 10 breaths per minute, B. Oxygen saturation LESS THAN 90%, C. Sedation score of 6  ondansetron Injectable 4 milliGRAM(s) IV Push every 6 hours PRN Nausea  oxyCODONE    IR 5 milliGRAM(s) Oral every 4 hours PRN Moderate Pain (4 - 6)  oxyCODONE    IR 10 milliGRAM(s) Oral every 4 hours PRN Severe Pain (7 - 10)      Allergies    No Known Allergies    Intolerances        PMH:   Nerve pain  Pain  Diabetes mellitus  Perforated diverticulum  Pain      Vitals:  Vital Signs Last 24 Hrs  T(C): 36.5 (21 Aug 2020 01:20), Max: 36.8 (20 Aug 2020 17:41)  T(F): 97.7 (21 Aug 2020 01:20), Max: 98.2 (20 Aug 2020 17:41)  HR: 61 (21 Aug 2020 01:20) (61 - 94)  BP: 106/67 (21 Aug 2020 01:20) (99/64 - 106/67)  BP(mean): --  RR: 17 (21 Aug 2020 01:20) (16 - 17)  SpO2: 97% (21 Aug 2020 01:20) (96% - 100%)      Physical Exam:  Constitutional: resting in bed with no acute distress  Neuro: AAOx3  Respiratory:  unlabored breathing  Gastrointestinal: Abdomen soft, non distended, non tenderness, surgical wounds clean dry and intact.   Extremities:  No edema, no calf tenderness  Skin: Non-jaundiced, no cyanosis or rash observed    Intake&Output:    08-19-20 @ 07:01  -  08-20-20 @ 07:00  --------------------------------------------------------  IN: 400 mL / OUT: 750 mL / NET: -350 mL    08-20-20 @ 07:01  -  08-21-20 @ 06:35  --------------------------------------------------------  IN: 1740 mL / OUT: 1425 mL / NET: 315 mL        LABS:  ----------  LABORATORY DATA:  ----------                        11.9   14.46 )-----------( 389      ( 20 Aug 2020 09:48 )             41.0               08-20    127<L>  |  94<L>  |  5<L>  ----------------------------<  220<H>  5.5<H>   |  14<L>  |  0.45<L>    Ca    9.0      20 Aug 2020 09:48  Phos  4.3     08-20  Mg     1.6     08-20          ABG - ( 19 Aug 2020 19:04 )  pH, Arterial: 7.37  pH, Blood: x     /  pCO2: 43    /  pO2: 256   / HCO3: 25    / Base Excess: 0.2   /  SaO2: 97.7
Pain Management Attending Addendum    SUBJECTIVE:    Therapy:	  [ ] IV PCA	   [ ] Epidural           [X ] s/p Spinal Opoid              [ ] Postpartum infusion	  [ ] Patient controlled regional anesthesia (PCRA)    [X ] prn Analgesics    OBJECTIVE:   [X ] No new signs     [ ] Other:    Side Effects:  [X ] None			[ ] Other:    ASSESSMENT/PLAN  [ X] Continue current therapy    [ ] Therapy changed to:    [ ] IV PCA       [ ] Epidural     [ X] prn Analgesics     Comments: Pain Management per primary service, APS to sign off.
GENERAL SURGERY DAILY PROGRESS NOTE:       Subjective: Patient examined at bedside. No acute events overnight.       Objective:    Vital Signs Last 24 Hrs  T(C): 36.7 (22 Aug 2020 05:47), Max: 37.2 (21 Aug 2020 13:34)  T(F): 98 (22 Aug 2020 05:47), Max: 98.9 (21 Aug 2020 13:34)  HR: 87 (22 Aug 2020 05:47) (81 - 88)  BP: 132/76 (22 Aug 2020 05:47) (124/67 - 141/89)  BP(mean): --  RR: 17 (22 Aug 2020 05:47) (17 - 19)  SpO2: 99% (22 Aug 2020 05:47) (98% - 100%)    I&O's Detail    21 Aug 2020 07:01  -  22 Aug 2020 07:00  --------------------------------------------------------  IN:    Oral Fluid: 680 mL  Total IN: 680 mL    OUT:    Voided: 600 mL  Total OUT: 600 mL    Total NET: 80 mL          Physical Exam:    General: NAD, laying comfortably in bed  Resp: no increased work of breathing  Abd: soft, non-distended, non tender, incisions c/d/i  Ext: warm and well perfused, grossly symmetric      Labaratory Results:                          10.5   9.99  )-----------( 441      ( 22 Aug 2020 06:40 )             35.2     08-22    141  |  105  |  6<L>  ----------------------------<  121<H>  3.8   |  23  |  0.48<L>    Ca    8.6      22 Aug 2020 06:40  Phos  2.5     08-22  Mg     2.1     08-22            Radiology and Additional Tests:    Medications:    MEDICATIONS  (STANDING):  dextrose 5%. 1000 milliLiter(s) (50 mL/Hr) IV Continuous <Continuous>  dextrose 50% Injectable 12.5 Gram(s) IV Push once  dextrose 50% Injectable 25 Gram(s) IV Push once  dextrose 50% Injectable 25 Gram(s) IV Push once  heparin   Injectable 5000 Unit(s) SubCutaneous every 8 hours  insulin lispro (HumaLOG) corrective regimen sliding scale   SubCutaneous three times a day before meals  insulin lispro (HumaLOG) corrective regimen sliding scale   SubCutaneous at bedtime  sodium chloride 0.9% lock flush 3 milliLiter(s) IV Push every 8 hours    MEDICATIONS  (PRN):  acetaminophen   Tablet .. 975 milliGRAM(s) Oral every 6 hours PRN Mild Pain (1 - 3)  dextrose 40% Gel 15 Gram(s) Oral once PRN Blood Glucose LESS THAN 70 milliGRAM(s)/deciliter  glucagon  Injectable 1 milliGRAM(s) IntraMuscular once PRN Glucose LESS THAN 70 milligrams/deciliter  oxyCODONE    IR 5 milliGRAM(s) Oral every 4 hours PRN Moderate Pain (4 - 6)  oxyCODONE    IR 10 milliGRAM(s) Oral every 4 hours PRN Severe Pain (7 - 10)

## 2020-08-22 NOTE — DISCHARGE NOTE PROVIDER - NSDCMRMEDTOKEN_GEN_ALL_CORE_FT
BD Sofia Pen Needles: Use one needle each day.  gabapentin 300 mg oral capsule: 1 cap(s) orally 3 times a day  glucometer : Test and record your blood sugar four times a day; before meals and at bedtime   glucometer strips:   lancets: 1 application subcutaneous 4 times a day as directed  metFORMIN 1000 mg oral tablet: 1 tab(s) orally 2 times a day BD Sofia Pen Needles: Use one needle each day.  gabapentin 300 mg oral capsule: 1 cap(s) orally 3 times a day  glucometer : Test and record your blood sugar four times a day; before meals and at bedtime   glucometer strips:   lancets: 1 application subcutaneous 4 times a day as directed  metFORMIN 1000 mg oral tablet: 1 tab(s) orally 2 times a day  oxyCODONE 5 mg oral tablet: 1 tab(s) orally every 6 hours as needed for severe pain MDD:4

## 2020-08-22 NOTE — DISCHARGE NOTE PROVIDER - NSDCACTIVITY_GEN_ALL_CORE
Showering allowed/Stairs allowed/Do not make important decisions/Walking - Indoors allowed/No heavy lifting/straining/Walking - Outdoors allowed

## 2020-08-25 PROBLEM — M79.2 NEURALGIA AND NEURITIS, UNSPECIFIED: Chronic | Status: ACTIVE | Noted: 2020-08-12

## 2020-08-25 PROBLEM — E11.9 TYPE 2 DIABETES MELLITUS WITHOUT COMPLICATIONS: Chronic | Status: ACTIVE | Noted: 2020-08-12

## 2020-08-25 PROBLEM — R52 PAIN, UNSPECIFIED: Chronic | Status: ACTIVE | Noted: 2020-08-12

## 2020-08-28 RX ORDER — NEOMYCIN SULFATE 500 MG/1
500 TABLET ORAL
Qty: 3 | Refills: 0 | Status: DISCONTINUED | COMMUNITY
Start: 2020-08-04 | End: 2020-08-19

## 2020-08-28 RX ORDER — METRONIDAZOLE 500 MG/1
500 TABLET ORAL
Qty: 3 | Refills: 0 | Status: DISCONTINUED | COMMUNITY
Start: 2020-08-04 | End: 2020-08-19

## 2020-09-01 ENCOUNTER — APPOINTMENT (OUTPATIENT)
Dept: COLORECTAL SURGERY | Facility: CLINIC | Age: 55
End: 2020-09-01
Payer: COMMERCIAL

## 2020-09-01 VITALS
SYSTOLIC BLOOD PRESSURE: 133 MMHG | TEMPERATURE: 98.8 F | OXYGEN SATURATION: 97 % | DIASTOLIC BLOOD PRESSURE: 88 MMHG | HEART RATE: 81 BPM

## 2020-09-01 VITALS
OXYGEN SATURATION: 98 % | TEMPERATURE: 98.5 F | SYSTOLIC BLOOD PRESSURE: 133 MMHG | HEART RATE: 81 BPM | DIASTOLIC BLOOD PRESSURE: 88 MMHG

## 2020-09-01 DIAGNOSIS — C18.9 MALIGNANT NEOPLASM OF COLON, UNSPECIFIED: ICD-10-CM

## 2020-09-01 PROCEDURE — 99024 POSTOP FOLLOW-UP VISIT: CPT

## 2020-09-01 NOTE — ASSESSMENT
[FreeTextEntry1] : Left hemicolectomy for colon cancer\par -Patient progressing well\par -Official pathology report pending. Preliminarily T3 N0 0/15, MX. Lymphovascular invasion noted\par -Will followup with patient once official report has been released\par -Patient to be evaluated by oncology referral given\par -Follow up in 4 weeks for reevaluation

## 2020-09-01 NOTE — HISTORY OF PRESENT ILLNESS
[FreeTextEntry1] : 55-year-old female status post Left hemicolectomy for obstructing colon mass. Patient progressing well. Tolerating diet. No fevers or chills. No nausea or vomiting

## 2020-09-02 LAB — SURGICAL PATHOLOGY STUDY: SIGNIFICANT CHANGE UP

## 2020-09-29 ENCOUNTER — APPOINTMENT (OUTPATIENT)
Dept: COLORECTAL SURGERY | Facility: CLINIC | Age: 55
End: 2020-09-29

## 2023-02-28 NOTE — H&P ADULT - PROBLEM SELECTOR PLAN 2
Last Office Visit   21 V Visit  [id number] (last 3 appts canceled)    Last Refill     sertraline (ZOLOFT) 100 MG tablet 90 tablet 0 2022     Sig: TAKE 1 TABLET BY MOUTH EVERY DAY    Sent to pharmacy as: Sertraline HCl 100 MG Oral Tablet (ZOLOFT)    Class: Eprescribe    E-Prescribing Status: Receipt confirmed by pharmacy (2022  9:07 AM CST)      pantoprazole (PROTONIX) 40 MG tablet 90 tablet 0 2022     Sig: TAKE 1 TABLET BY MOUTH EVERY DAY    Sent to pharmacy as: Pantoprazole Sodium 40 MG Oral Tablet Delayed Release (PROTONIX)    Class: Eprescribe    E-Prescribing Status: Receipt confirmed by pharmacy (2022  9:07 AM CST)      lisinopril (ZESTRIL) 20 MG tablet 180 tablet 0 2022     Sig - Route: TAKE 2 TABLETS BY MOUTH DAILY - Oral    Sent to pharmacy as: Lisinopril 20 MG Oral Tablet (ZESTRIL)    Class: Eprescribe    E-Prescribing Status: Receipt confirmed by pharmacy (2022  9:07 AM CST)         hydrochlorothiazide (MICROZIDE) 12.5 MG capsule 90 capsule 0 2022     Sig: TAKE 1 CAPSULE BY MOUTH EVERY DAY IN THE MORNING    Sent to pharmacy as: hydroCHLOROthiazide 12.5 MG Oral Capsule (MICROZIDE)    Class: Eprescribe    E-Prescribing Status: Receipt confirmed by pharmacy (2022  9:07 AM CST)      hydrALAZINE (APRESOLINE) 50 MG tablet 180 tablet 0 2022     Sig: TAKE 1 TABLET BY MOUTH TWICE A DAY    Sent to pharmacy as: hydrALAZINE HCl 50 MG Oral Tablet (APRESOLINE)    Class: Eprescribe    E-Prescribing Status: Receipt confirmed by pharmacy (2022  9:06 AM CST)      doxazosin (CARDURA) 8 MG tablet 90 tablet 0 2022     Sig: TAKE 1 TABLET BY MOUTH EVERY DAY IN THE EVENING    Sent to pharmacy as: Doxazosin Mesylate 8 MG Oral Tablet (CARDURA)    Class: Eprescribe    E-Prescribing Status: Receipt confirmed by pharmacy (2022  9:07 AM CST)      dilTIAZem (CARDIZEM CD) 240 MG 24 hr capsule 90 capsule 0 2022     Sig: TAKE 1 CAPSULE BY MOUTH EVERY DAY    Sent  to pharmacy as: dilTIAZem HCl ER Coated Beads 240 MG Oral Capsule Extended Release 24 Hour (CARDIZEM CD)    Class: Eprescribe    E-Prescribing Status: Receipt confirmed by pharmacy (12/2/2022  9:07 AM CST)      bisoprolol-hydrochlorothiazide (ZIAC) 10-6.25 MG per tablet 90 tablet 0 12/2/2022     Sig: TAKE 1 TABLET BY MOUTH EVERY DAY    Sent to pharmacy as: Bisoprolol-hydroCHLOROthiazide 10-6.25 MG Oral Tablet (ZIAC)    Class: Eprescribe    E-Prescribing Status: Receipt confirmed by pharmacy (12/2/2022  9:07 AM CST)        FAILED PROTOCOL   Last appt and labs over 1 year   Medication has been pended for provider review.    6 units insulin given in ED + ISS  Will need repeat glucose checks and insulin  Repeat labs with concern for developing hypokalemia 2/2 insulin admin  Trend lactate after fluid resuscitation 6 units insulin given in ED + ISS  Will need repeat glucose checks and insulin  Repeat labs with concern for developing hypokalemia 2/2 insulin admin at 6pm  Trend lactate after fluid resuscitation

## 2023-04-05 NOTE — ED PROVIDER NOTE - MDM ORDERS SUBMITTED SELECTION
"Tendon Origin: R hip joint    Date/Time: 4/5/2023 11:15 AM  Performed by: Marlo Galeana MD  Authorized by: Marlo Galeana MD     Consent Done?:  Yes (Verbal)  Timeout: prior to procedure the correct patient, procedure, and site was verified    Indications:  Pain  Site marked: the procedure site was marked    Timeout: prior to procedure the correct patient, procedure, and site was verified    Location:  Hip  Site:  R hip joint  Prep: patient was prepped and draped in usual sterile fashion    Ultrasonic Guidance for Needle Placement?: Yes    Needle size:  20 G  Approach:  Posterolateral  Medications:  40 mg triamcinolone acetonide 40 mg/mL  Patient tolerance:  Patient tolerated the procedure well with no immediate complications   Gluteus medius and gluteus minimus muscles, tendon sheaths, and tendon insertions injection    Description of ultrasound utilization for needle guidance:   Ultrasound guidance used for needle localization. Images saved and stored for documentation. The gluteus medius and minimus muscles and tendons were visualized at their insertions on the greater trochanter. Dynamic visualization of the 20g x 3.5" needle was continuous throughout the procedure.  "
Labs/Imaging Studies/Medications

## 2023-05-26 NOTE — ED ADULT NURSE NOTE - SEPSIS REFERENCE DATA CRITERIA 3
Abnormal Bands: > 10% Cyclophosphamide Pregnancy And Lactation Text: This medication is Pregnancy Category D and it isn't considered safe during pregnancy. This medication is excreted in breast milk.

## 2025-03-26 NOTE — PATIENT PROFILE ADULT - BILL PAYMENT
Patient hospital volunteer witnessed by ED staff becoming pale, diaphoretic and unresponsive. Patient was sitting in chair behind triage desk, placed on cart. Patient SBP in 50s, . Patient endorses that she has been fasting for Ramadan. Patient given juice, IV started and rapid fluid bolus given. Patient taken to HT8, patient more responsive at this time, endorses that she was starting to feel faint prior to episode. No other complaints.    no